# Patient Record
Sex: FEMALE | Race: WHITE | NOT HISPANIC OR LATINO | Employment: FULL TIME | URBAN - METROPOLITAN AREA
[De-identification: names, ages, dates, MRNs, and addresses within clinical notes are randomized per-mention and may not be internally consistent; named-entity substitution may affect disease eponyms.]

---

## 2020-08-10 ENCOUNTER — OFFICE VISIT (OUTPATIENT)
Dept: ENDOCRINOLOGY | Facility: CLINIC | Age: 60
End: 2020-08-10
Payer: COMMERCIAL

## 2020-08-10 VITALS
SYSTOLIC BLOOD PRESSURE: 138 MMHG | HEART RATE: 94 BPM | HEIGHT: 60 IN | WEIGHT: 179.44 LBS | BODY MASS INDEX: 35.23 KG/M2 | DIASTOLIC BLOOD PRESSURE: 78 MMHG

## 2020-08-10 DIAGNOSIS — E11.9 TYPE 2 DIABETES MELLITUS WITHOUT COMPLICATION, WITH LONG-TERM CURRENT USE OF INSULIN: Primary | ICD-10-CM

## 2020-08-10 DIAGNOSIS — E66.01 CLASS 2 SEVERE OBESITY WITH SERIOUS COMORBIDITY AND BODY MASS INDEX (BMI) OF 35.0 TO 35.9 IN ADULT, UNSPECIFIED OBESITY TYPE: ICD-10-CM

## 2020-08-10 DIAGNOSIS — Z79.4 TYPE 2 DIABETES MELLITUS WITHOUT COMPLICATION, WITH LONG-TERM CURRENT USE OF INSULIN: Primary | ICD-10-CM

## 2020-08-10 PROBLEM — E66.812 CLASS 2 SEVERE OBESITY WITH SERIOUS COMORBIDITY IN ADULT: Status: ACTIVE | Noted: 2020-08-10

## 2020-08-10 PROCEDURE — 3008F BODY MASS INDEX DOCD: CPT | Mod: CPTII,S$GLB,, | Performed by: INTERNAL MEDICINE

## 2020-08-10 PROCEDURE — 3008F PR BODY MASS INDEX (BMI) DOCUMENTED: ICD-10-PCS | Mod: CPTII,S$GLB,, | Performed by: INTERNAL MEDICINE

## 2020-08-10 PROCEDURE — 99999 PR PBB SHADOW E&M-NEW PATIENT-LVL IV: CPT | Mod: PBBFAC,,, | Performed by: INTERNAL MEDICINE

## 2020-08-10 PROCEDURE — 99999 PR PBB SHADOW E&M-NEW PATIENT-LVL IV: ICD-10-PCS | Mod: PBBFAC,,, | Performed by: INTERNAL MEDICINE

## 2020-08-10 PROCEDURE — 99204 OFFICE O/P NEW MOD 45 MIN: CPT | Mod: S$GLB,,, | Performed by: INTERNAL MEDICINE

## 2020-08-10 PROCEDURE — 99204 PR OFFICE/OUTPT VISIT, NEW, LEVL IV, 45-59 MIN: ICD-10-PCS | Mod: S$GLB,,, | Performed by: INTERNAL MEDICINE

## 2020-08-10 RX ORDER — METFORMIN HYDROCHLORIDE 500 MG/1
1000 TABLET, EXTENDED RELEASE ORAL 2 TIMES DAILY WITH MEALS
Qty: 360 TABLET | Refills: 3 | Status: SHIPPED | OUTPATIENT
Start: 2020-08-10 | End: 2021-09-03

## 2020-08-10 RX ORDER — METFORMIN HYDROCHLORIDE 500 MG/1
500 TABLET ORAL 4 TIMES DAILY
COMMUNITY
Start: 2019-01-01 | End: 2020-08-10

## 2020-08-10 RX ORDER — FLUTICASONE PROPIONATE 50 MCG
1 SPRAY, SUSPENSION (ML) NASAL DAILY
COMMUNITY

## 2020-08-10 RX ORDER — DULAGLUTIDE 0.75 MG/.5ML
0.75 INJECTION, SOLUTION SUBCUTANEOUS
Qty: 4 PEN | Refills: 5 | Status: SHIPPED | OUTPATIENT
Start: 2020-08-10 | End: 2020-12-28 | Stop reason: SDUPTHER

## 2020-08-10 RX ORDER — INSULIN DEGLUDEC 200 U/ML
20 INJECTION, SOLUTION SUBCUTANEOUS DAILY
COMMUNITY
Start: 2020-07-14 | End: 2020-12-28 | Stop reason: SDUPTHER

## 2020-08-10 RX ORDER — INSULIN ASPART 100 [IU]/ML
8 INJECTION, SUSPENSION SUBCUTANEOUS
COMMUNITY
End: 2020-09-21

## 2020-08-10 RX ORDER — MULTIVIT WITH MINERALS/HERBS
1 TABLET ORAL DAILY
COMMUNITY
End: 2023-07-07 | Stop reason: SDUPTHER

## 2020-08-10 RX ORDER — PEN NEEDLE, DIABETIC 31 GX5/16"
NEEDLE, DISPOSABLE MISCELLANEOUS
COMMUNITY
Start: 2020-07-25 | End: 2020-08-19 | Stop reason: SDUPTHER

## 2020-08-10 RX ORDER — INSULIN ASPART 100 [IU]/ML
6 INJECTION, SOLUTION INTRAVENOUS; SUBCUTANEOUS
COMMUNITY
Start: 2020-08-03 | End: 2020-12-28 | Stop reason: SDUPTHER

## 2020-08-10 RX ORDER — BLOOD-GLUCOSE TRANSMITTER
2 EACH MISCELLANEOUS
Qty: 2 DEVICE | Refills: 1 | Status: SHIPPED | OUTPATIENT
Start: 2020-08-10 | End: 2021-10-08 | Stop reason: SDUPTHER

## 2020-08-10 RX ORDER — BLOOD-GLUCOSE SENSOR
3 EACH MISCELLANEOUS
Qty: 3 DEVICE | Refills: 11 | Status: SHIPPED | OUTPATIENT
Start: 2020-08-10 | End: 2022-01-28 | Stop reason: SDUPTHER

## 2020-08-10 RX ORDER — CETIRIZINE HYDROCHLORIDE 10 MG/1
10 TABLET ORAL DAILY
COMMUNITY
Start: 2013-10-10

## 2020-08-10 NOTE — Clinical Note
HI,  This is a patient you saw at [facility] who came to see me so she can re-establish with you. Starting Dexcom and doing labs to evaluate DM etiology   I asked her to see you in about 1 month

## 2020-08-10 NOTE — PATIENT INSTRUCTIONS
When starting Trulicity, decrease novolog to 4 units with breakfast and 6 units lunch and dinner  Start new scale  Continue Tresiba 20 units daily and metformin

## 2020-08-10 NOTE — ASSESSMENT & PLAN NOTE
Reports diagnosis at age 24 and per her type 2. Will further evaluate with c-peptide, REILLY given age at diagnosis and long-standing disease  A1c and SMBG above goal  Discussed options and will retry Trulicity with reduced insulin doses as below as would benefit from weight loss    New Dm regimen:  Patient Instructions   When starting Trulicity, decrease novolog to 4 units with breakfast and 6 units lunch and dinner  Start new scale  Continue Tresiba 20 units daily and metformin       Check labs before next visit    Logs provided and will bring to visit      Will send prescription for Dexcom as would benefit greatly from system given glycemic variability with both hyperglycemia and hypoglycemia                 She is willing and able to use the device, demonstrated an understanding of the technology and is motivated to use CGM                She is expected to adhere to a comprehensive diabetes treatment plan and patient has adequate medical supervision                I am also suspicious of hypoglycemia or multiple impaired awareness of hypoglycemia (hypo unawareness)

## 2020-08-10 NOTE — PROGRESS NOTES
"Kalpana Rivera is a 59 y.o. female [facility]    History of Present Illness  T2DM  Diagnosed at age 24. On oral medications alone in the beginning. Started insulin during first pregnancy and able to come off. In  had son and has been on insulin since that time  Known complications:denies    Current Diabetes Regimen:  Tresiba 20 units daily  Novolog 6/8-10 lunch and dinner   Metformin 1000 mg BID    Timing of prandial insulin: right before or after  Omitted doses: not very often, dinner usually     Prior mediations tried:  Trulicity- very low appetite so stopped  levemir    Recent Hgb A1C:  No results found for: LABA1C, HGBA1C   2020 8.3%    Glucose Monitorin times daily  Fastin-120  Lunch: 180-200  Dinner: 180-200    Hypoglycemic Episodes:  Used to get them often but less frequently now. Not particular time of day    Screening / DM Complications:    Nephropathy:  ACEi/ARB: Not taking  No results found for: MICALBCREAT    Last Lipid Panel:  Statin: Not taking  No results found for: LDLCALC      Last foot exam Most Recent Foot Exam Date: Not Found  Last eye exam : 2020;  At last visit sent to specialist    B12:  No results found for: [id number]            Current Outpatient Medications:     b complex vitamins tablet, Take 1 tablet by mouth once daily., Disp: , Rfl:     BD ULTRA-FINE SHORT PEN NEEDLE 31 gauge x 5/16" Ndle, USE QID UTD, Disp: , Rfl:     cetirizine (ZYRTEC) 10 MG tablet, Take 10 mg by mouth once daily., Disp: , Rfl:     fluticasone propionate (FLONASE) 50 mcg/actuation nasal spray, 1 spray by Each Nostril route once daily., Disp: , Rfl:     insulin aspart protamine-insulin aspart (NOVOLOG 70/30) 100 unit/mL (70-30) InPn pen, Inject 8 Units into the skin 2 (two) times daily before meals. 8-10 units at lunch and dinner, Disp: , Rfl:     Lactobacillus rhamnosus GG (CULTURELLE) 10 billion cell capsule, Take 1 capsule by mouth once " daily., Disp: , Rfl:     multivitamin with minerals tablet, Take 1 tablet by mouth once daily., Disp: , Rfl:     NOVOLOG FLEXPEN U-100 INSULIN 100 unit/mL (3 mL) InPn pen, Inject 6 Units into the skin before breakfast., Disp: , Rfl:     TRESIBA FLEXTOUCH U-200 200 unit/mL (3 mL) InPn, 20 Units by abdominal subcutaneous route once daily., Disp: , Rfl:     TURMERIC ORAL, Take by mouth., Disp: , Rfl:     UNABLE TO FIND, Cranberry & Buchu natural supplement, Disp: , Rfl:     UNABLE TO FIND, Nerve Eight natural supplement, Disp: , Rfl:     UNABLE TO FIND, [place] natural supplement, Disp: , Rfl:     blood-glucose sensor (DEXCOM G6 SENSOR) Brittany, 3 Devices by Misc.(Non-Drug; Combo Route) route every 30 days., Disp: 3 Device, Rfl: 11    blood-glucose transmitter (DEXCOM G6 TRANSMITTER) Brittany, 2 Devices by Misc.(Non-Drug; Combo Route) route every 6 (six) months., Disp: 2 Device, Rfl: 1    dulaglutide (TRULICITY) 0.75 mg/0.5 mL pen injector, Inject 0.75 mg into the skin every 7 days., Disp: 4 pen, Rfl: 5    metFORMIN (GLUCOPHAGE-XR) 500 MG XR 24hr tablet, Take 2 tablets (1,000 mg total) by mouth 2 (two) times daily with meals., Disp: 360 tablet, Rfl: 3    Review of Systems   Constitutional: Negative for fever.   Eyes: Negative for pain.   Respiratory: Negative for shortness of breath.    Cardiovascular: Negative for chest pain and leg swelling.   Gastrointestinal: Negative for abdominal pain.   Genitourinary: Negative for dysuria.   Musculoskeletal: Negative for arthralgias.   Skin: Negative for rash.   Neurological: Negative for headaches.   Psychiatric/Behavioral: Negative for confusion.       Objective:     Vitals:    08/10/20 1458   BP: 138/78   Pulse: 94     Wt Readings from Last 3 Encounters:   08/10/20 81.4 kg (179 lb 7.3 oz)     Body mass index is 35.05 kg/m².  Physical Exam  Constitutional:       Appearance: She is well-developed.   HENT:      Head: Normocephalic.      Right Ear: External ear normal.       Left Ear: External ear normal.   Eyes:      Conjunctiva/sclera: Conjunctivae normal.      Pupils: Pupils are equal, round, and reactive to light.   Neck:      Thyroid: No thyromegaly.      Trachea: No tracheal deviation.      Comments: No thyromegaly or nodules  Cardiovascular:      Rate and Rhythm: Normal rate and regular rhythm.      Heart sounds: No murmur.      Comments: No LE edema  Pulmonary:      Effort: Pulmonary effort is normal.      Breath sounds: Normal breath sounds.   Abdominal:      General: There is no distension.      Palpations: Abdomen is soft. There is no mass.      Tenderness: There is no abdominal tenderness.      Hernia: No hernia is present.      Comments: No lipohypertrophy   Skin:     General: Skin is warm.      Findings: No rash.      Comments: No nodules   Neurological:      Mental Status: She is alert and oriented to person, place, and time.   Psychiatric:         Judgment: Judgment normal.     Protective Sensation (w/ 10 gram monofilament):8/10/20  Right: Intact  Left: Intact    Visual Inspection:  Normal -  Bilateral    Pedal Pulses:   Right: Present  Left: Present    Posterior tibialis:   Right:Present  Left: Present        Labs    Chemistry    No results found for: NA, K, CL, CO2, BUN, CREATININE, GLU No results found for: CALCIUM, ALKPHOS, AST, ALT, BILITOT, ESTGFRAFRICA, EGFRNONAA           Assessment and Plan     Type 2 diabetes mellitus without complication, with long-term current use of insulin  Reports diagnosis at age 24 and per her type 2. Will further evaluate with c-peptide, REILLY given age at diagnosis and long-standing disease  A1c and SMBG above goal  Discussed options and will retry Trulicity with reduced insulin doses as below as would benefit from weight loss    New Dm regimen:  Patient Instructions   When starting Trulicity, decrease novolog to 4 units with breakfast and 6 units lunch and dinner  Start new scale  Continue Tresiba 20 units daily and metformin        Check labs before next visit    Logs provided and will bring to visit      Will send prescription for Dexcom as would benefit greatly from system given glycemic variability with both hyperglycemia and hypoglycemia                 She is willing and able to use the device, demonstrated an understanding of the technology and is motivated to use CGM                She is expected to adhere to a comprehensive diabetes treatment plan and patient has adequate medical supervision                I am also suspicious of hypoglycemia or multiple impaired awareness of hypoglycemia (hypo unawareness)                  Class 2 severe obesity with serious comorbidity in adult  Start Trulicity as above        RTC 1 month with Agata Zeng MD

## 2020-08-19 RX ORDER — PEN NEEDLE, DIABETIC 31 GX5/16"
NEEDLE, DISPOSABLE MISCELLANEOUS
Qty: 120 EACH | Refills: 11 | Status: SHIPPED | OUTPATIENT
Start: 2020-08-19 | End: 2021-07-07

## 2020-08-19 NOTE — TELEPHONE ENCOUNTER
"----- Message from Rose Hernandez sent at 8/19/2020 12:34 PM CDT -----  Pt states she need a refill on her medication BD ULTRA-FINE SHORT PEN NEEDLE 31 gauge x 5/16" Ndle    Please send to [id number] - [place], [place] - [address] AT [facility] OF [address] [phone] (Phone)  [phone] (Fax)      "

## 2020-09-16 LAB
ALBUMIN SERPL-MCNC: 4.1 G/DL (ref 3.6–5.1)
ALBUMIN/GLOB SERPL: 1.7 (CALC) (ref 1–2.5)
ALP SERPL-CCNC: 63 U/L (ref 37–153)
ALT SERPL-CCNC: 15 U/L (ref 6–29)
AST SERPL-CCNC: 14 U/L (ref 10–35)
BILIRUB SERPL-MCNC: 0.4 MG/DL (ref 0.2–1.2)
BUN SERPL-MCNC: 10 MG/DL (ref 7–25)
BUN/CREAT SERPL: ABNORMAL (CALC) (ref 6–22)
C PEPTIDE SERPL-MCNC: 0.1 NG/ML (ref 0.8–3.85)
CALCIUM SERPL-MCNC: 9.2 MG/DL (ref 8.6–10.4)
CHLORIDE SERPL-SCNC: 103 MMOL/L (ref 98–110)
CHOLEST SERPL-MCNC: 122 MG/DL
CHOLEST/HDLC SERPL: 1.9 (CALC)
CO2 SERPL-SCNC: 30 MMOL/L (ref 20–32)
CREAT SERPL-MCNC: 0.67 MG/DL (ref 0.5–0.99)
GAD65 AB SER IA-ACNC: 97 IU/ML
GFRSERPLBLD MDRD-ARVRAT: 96 ML/MIN/1.73M2
GLOBULIN SER CALC-MCNC: 2.4 G/DL (CALC) (ref 1.9–3.7)
GLUCOSE SERPL-MCNC: 154 MG/DL (ref 65–99)
HBA1C MFR BLD: 7.6 % OF TOTAL HGB
HDLC SERPL-MCNC: 63 MG/DL
LDLC SERPL CALC-MCNC: 46 MG/DL (CALC)
NONHDLC SERPL-MCNC: 59 MG/DL (CALC)
POTASSIUM SERPL-SCNC: 4.1 MMOL/L (ref 3.5–5.3)
PROT SERPL-MCNC: 6.5 G/DL (ref 6.1–8.1)
SODIUM SERPL-SCNC: 141 MMOL/L (ref 135–146)
TRIGL SERPL-MCNC: 44 MG/DL
TSH SERPL-ACNC: 1.8 MIU/L (ref 0.4–4.5)

## 2020-09-18 NOTE — PROGRESS NOTES
Subjective:      Patient ID: Kalpana Rivera is a 59 y.o. female.    Chief Complaint:  Diabetes    History of Present Illness  Kalpana Rivera presents today for follow up of now managed as type 1 diabetic-insulin dependent. Previous patient of Dr. Zeng. Last seen in Aug 2020. This is her first visit with me.      Ref. Range 2020 07:45   C-Peptide Latest Ref Range: 0.80 - 3.85 ng/mL 0.10 (L)      Ref. Range 2020 07:45   Glucose Latest Ref Range: 65 - 99 mg/dL 154 (H)     Glutamate decarboxylase 65 Ab Latest Ref Range: <5 IU/mL 97 (H)     With regards to the diabetes:    Diagnosed with diabetes age 24  Started insulin during first pregnancy and able to come off. In  had son and has been on insulin since that time  Nephew is type 1  Family History of Type 2 DM: mother and father  Known complications:  DKA/HHS: twice in the past  RN: radha; up to date with eye exam  PN: denies  Nephropathy: due  Gastroparesis: denies  CAD: denies    Current regimen:  Tresiba  20 units daily  Metformin 1000 mg BID  Novolog 6/8/10 -she takes 6-12 units before meals and has decreased her insulin  Trulicity 0.75 mg weekly  Noticed better BGs since Trulicity    Missed doses  Timing of prandial insulin: right before or after  Omitted doses: not very often, dinner usually     Other medications tried:  Trulicity 1.5 mg weekly- very low appetite so stopped  levemir    # times a day testing  Has dexcom but did not put on  4 times daily  Fastin-150  Lunch: 120-150; some outlier of 170 or 180  Dinner: 120-150  Log reviewed: oral recall    Hypoglycemic event  A few and has reduced insulin as above  Yes, did not need assistance   Knows how to correct with 15 grams of carbs- juice, coke, or a peppermint.     Dietary recall: She feels that she is following diabetic diet  Eats 3 meals a day. States that she has noticed portion sizes decreased because she is not as hungry.  B: greek yogurt with splenda or protein shake  -premier  L: healthy choice or grilled cheese sandwich  D: same as lunch  She does not cook often but has some frozen foods.   She has been working from home.   Snacks: not really snacking as much anymore; pickles and cashews; carrots  Drinks: water, green tea, and diet coke    Exercise - tried to stay active. No formal exercise. She knows that she needs to use her treadmill.     Education - last visit: politely declines    Has a Medic alert tag-does not have  Glucagon/Baqsimi-declines; lives alone    Diabetes Management Status  Statin: Not taking  ACE/ARB: Not taking    States last A1c (not in our system) was 8.3%  Lab Results   Component Value Date    HGBA1C 7.6 (H) 09/14/2020     Screening or Prevention Patient's value Goal Complete/Controlled?   HgA1C Testing and Control   Lab Results   Component Value Date    HGBA1C 7.6 (H) 09/14/2020      Annually/Less than 8% Yes   Lipid profile : 09/14/2020 Annually Yes   LDL control Lab Results   Component Value Date    LDLCALC 46 09/14/2020    Annually/Less than 100 mg/dl  Yes   Nephropathy screening No results found for: LABMICR  No results found for: PROTEINUA Annually No   Blood pressure BP Readings from Last 1 Encounters:   09/21/20 125/72    Less than 140/90 Yes   Dilated retinal exam : 01/01/2020 Annually Yes   Foot exam   : 08/10/2020 Annually Yes     With regards to thyroid nodule,     Remembers being told in the past that she had a thyroid nodule and needed biopsy which she did not do. Denies hoarseness or dysphagia. No radiation to head or neck. No history of thyroid cancer in family.       Not on medications for cholesterol     Ref. Range 9/14/2020 07:45   Cholesterol Latest Ref Range: <200 mg/dL 122   HDL Latest Ref Range: > OR = 50 mg/dL 63   Hdl/Cholesterol Ratio Latest Ref Range: <5.0 (calc) 1.9   LDL Cholesterol External Latest Units: mg/dL (calc) 46   Non HDL Chol. (LDL+VLDL) Latest Ref Range: <130 mg/dL (calc) 59   Triglycerides Latest Ref Range: <150  "mg/dL 44     Review of Systems   Constitutional: Negative for fatigue.   Eyes: Negative for visual disturbance.   Respiratory: Negative for shortness of breath.    Cardiovascular: Negative for chest pain.   Gastrointestinal: Negative for abdominal pain.   Musculoskeletal: Negative for arthralgias.   Skin: Negative for wound.   Neurological: Negative for headaches.   Hematological: Does not bruise/bleed easily.   Psychiatric/Behavioral: Negative for sleep disturbance.     Objective:   Physical Exam  Vitals signs reviewed.   Constitutional:       Appearance: She is well-developed.   Neck:      Thyroid: No thyromegaly.   Cardiovascular:      Rate and Rhythm: Normal rate.   Pulmonary:      Effort: Pulmonary effort is normal.   Abdominal:      Palpations: Abdomen is soft.     injection sites are without edema or erythema. No lipo hypertropthy or atrophy  Diabetes Foot Exam:   Denies sores or lesions to bilateral feet  Shoes appropriate  DM foot exam deferred; done in Aug 2020    Visit Vitals  /72 (BP Location: Left arm, Patient Position: Sitting, BP Method: Large (Manual))   Pulse 92   Ht 4' 11.5" (1.511 m)   Wt 80.3 kg (177 lb 0.5 oz)   BMI 35.16 kg/m²        Lab Review:   Lab Results   Component Value Date    HGBA1C 7.6 (H) 09/14/2020      Lab Results   Component Value Date    CHOL 122 09/14/2020    HDL 63 09/14/2020    LDLCALC 46 09/14/2020    TRIG 44 09/14/2020    CHOLHDL 1.9 09/14/2020     Lab Results   Component Value Date     09/14/2020    K 4.1 09/14/2020     09/14/2020    CO2 30 09/14/2020     (H) 09/14/2020    BUN 10 09/14/2020    CREATININE 0.67 09/14/2020    CALCIUM 9.2 09/14/2020    PROT 6.5 09/14/2020    ALBUMIN 4.1 09/14/2020    BILITOT 0.4 09/14/2020    AST 14 09/14/2020    ALT 15 09/14/2020    ESTGFRAFRICA 111 09/14/2020    EGFRNONAA 96 09/14/2020    TSH 1.80 09/14/2020     No results found for: [id number]  Assessment and Plan     1. Type 1 diabetes mellitus without " complication  Comprehensive metabolic panel    Hemoglobin A1C   2. Class 2 severe obesity with serious comorbidity and body mass index (BMI) of 35.0 to 35.9 in adult, unspecified obesity type     3. 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 Thyroid nodule  US Soft Tissue Head Neck Thyroid    CANCELED: US Soft Tissue Head Neck Thyroid       Type 2 diabetes mellitus without complication, with long-term current use of insulin  Reports diagnosis at age 24 and per her type 2. Will further evaluate with c-peptide, REILLY given age at diagnosis and long-standing disease  A1c and SMBG above goal  Discussed options and will retry Trulicity with reduced insulin doses as below as would benefit from weight loss    New Dm regimen:  There are no Patient Instructions on file for this visit.    Check labs before next visit    Logs provided and will bring to visit      Will send prescription for Dexcom as would benefit greatly from system given glycemic variability with both hyperglycemia and hypoglycemia                 She is willing and able to use the device, demonstrated an understanding of the technology and is motivated to use CGM                She is expected to adhere to a comprehensive diabetes treatment plan and patient has adequate medical supervision                I am also suspicious of hypoglycemia or multiple impaired awareness of hypoglycemia (hypo unawareness)                  Class 2 severe obesity with serious comorbidity in adult  Continue Trulicity as above  Has had 2 pound weight loss since her last visit one month ago    Type 1 diabetes mellitus  -- Reviewed goals of therapy are to get the best control we can without hypoglycemia  -- Refer to diabetes education for dexcom training  -- Discussed her most recent labs show she is not able to produce much insulin. Treat as type 1 diabetic. Continue Trulicity due to satiety effects which  may help with weight loss.  Medication changes:   Continue    Tresiba  20 units daily    Metformin 1000 mg BID    Novolog 6/8/10    Trulicity 0.75 mg weekly     Continue     Tresiba  20 units daily    Metformin 1000 mg BID    Novolog 6/8/10    Trulicity 0.75 mg weekly    We will get her set up with diabetes education to apply dexcom and then review data. Instructed and demonstrated on how to add event on dexcom for insulin administration. Please try to give insulin at least 10 minutes before a meal. Discussed Vgo and she will let us know in the future  -- Reviewed patient's current insulin regimen. Clarified proper insulin dose and timing in relation to meals, etc. Insulin injection sites and proper rotation instructed.    -- Advised frequent self blood glucose monitoring.  Patient encouraged to document glucose results and bring them to every clinic visit    -- Hypoglycemia precautions discussed. Instructed on precautions before driving.    -- Call for Bg repeatedly < 90 or > 180.   -- Close adherence to lifestyle changes recommended.   -- Periodic follow ups for eye evaluations, foot care and dental care suggested.  -- Given information on diabetic snacks and hypoglycemia      Thyroid nodule  -- Check thyroid US      Follow up in about 3 months (around 12/21/2020).  Labs prior to next visit

## 2020-09-21 ENCOUNTER — OFFICE VISIT (OUTPATIENT)
Dept: ENDOCRINOLOGY | Facility: CLINIC | Age: 60
End: 2020-09-21
Payer: COMMERCIAL

## 2020-09-21 VITALS
HEART RATE: 92 BPM | BODY MASS INDEX: 34.75 KG/M2 | HEIGHT: 60 IN | WEIGHT: 177 LBS | DIASTOLIC BLOOD PRESSURE: 72 MMHG | SYSTOLIC BLOOD PRESSURE: 125 MMHG

## 2020-09-21 DIAGNOSIS — E66.01 CLASS 2 SEVERE OBESITY WITH SERIOUS COMORBIDITY AND BODY MASS INDEX (BMI) OF 35.0 TO 35.9 IN ADULT, UNSPECIFIED OBESITY TYPE: ICD-10-CM

## 2020-09-21 DIAGNOSIS — E10.9 TYPE 1 DIABETES MELLITUS WITHOUT COMPLICATION: Primary | ICD-10-CM

## 2020-09-21 DIAGNOSIS — E04.1 THYROID NODULE: ICD-10-CM

## 2020-09-21 DIAGNOSIS — E13.9 LADA (LATENT AUTOIMMUNE DIABETES IN ADULTS), MANAGED AS TYPE 1: ICD-10-CM

## 2020-09-21 PROBLEM — E11.9 TYPE 2 DIABETES MELLITUS WITHOUT COMPLICATION, WITH LONG-TERM CURRENT USE OF INSULIN: Status: RESOLVED | Noted: 2020-08-10 | Resolved: 2020-09-21

## 2020-09-21 PROBLEM — Z79.4 TYPE 2 DIABETES MELLITUS WITHOUT COMPLICATION, WITH LONG-TERM CURRENT USE OF INSULIN: Status: RESOLVED | Noted: 2020-08-10 | Resolved: 2020-09-21

## 2020-09-21 PROCEDURE — 3051F PR MOST RECENT HEMOGLOBIN A1C LEVEL 7.0 - < 8.0%: ICD-10-PCS | Mod: CPTII,S$GLB,, | Performed by: NURSE PRACTITIONER

## 2020-09-21 PROCEDURE — 99999 PR PBB SHADOW E&M-EST. PATIENT-LVL V: CPT | Mod: PBBFAC,,, | Performed by: NURSE PRACTITIONER

## 2020-09-21 PROCEDURE — 3008F PR BODY MASS INDEX (BMI) DOCUMENTED: ICD-10-PCS | Mod: CPTII,S$GLB,, | Performed by: NURSE PRACTITIONER

## 2020-09-21 PROCEDURE — 99214 PR OFFICE/OUTPT VISIT, EST, LEVL IV, 30-39 MIN: ICD-10-PCS | Mod: S$GLB,,, | Performed by: NURSE PRACTITIONER

## 2020-09-21 PROCEDURE — 3051F HG A1C>EQUAL 7.0%<8.0%: CPT | Mod: CPTII,S$GLB,, | Performed by: NURSE PRACTITIONER

## 2020-09-21 PROCEDURE — 99999 PR PBB SHADOW E&M-EST. PATIENT-LVL V: ICD-10-PCS | Mod: PBBFAC,,, | Performed by: NURSE PRACTITIONER

## 2020-09-21 PROCEDURE — 99214 OFFICE O/P EST MOD 30 MIN: CPT | Mod: S$GLB,,, | Performed by: NURSE PRACTITIONER

## 2020-09-21 PROCEDURE — 3008F BODY MASS INDEX DOCD: CPT | Mod: CPTII,S$GLB,, | Performed by: NURSE PRACTITIONER

## 2020-09-21 NOTE — ASSESSMENT & PLAN NOTE
Reports diagnosis at age 24 and per her type 2. Will further evaluate with c-peptide, REILLY given age at diagnosis and long-standing disease  A1c and SMBG above goal  Discussed options and will retry Trulicity with reduced insulin doses as below as would benefit from weight loss    New Dm regimen:  There are no Patient Instructions on file for this visit.    Check labs before next visit    Logs provided and will bring to visit      Will send prescription for Dexcom as would benefit greatly from system given glycemic variability with both hyperglycemia and hypoglycemia                 She is willing and able to use the device, demonstrated an understanding of the technology and is motivated to use CGM                She is expected to adhere to a comprehensive diabetes treatment plan and patient has adequate medical supervision                I am also suspicious of hypoglycemia or multiple impaired awareness of hypoglycemia (hypo unawareness)

## 2020-09-21 NOTE — ASSESSMENT & PLAN NOTE
-- Reviewed goals of therapy are to get the best control we can without hypoglycemia  -- Refer to diabetes education for dexcom training  -- Discussed her most recent labs show she is not able to produce much insulin. Treat as type 1 diabetic. Continue Trulicity due to satiety effects which may help with weight loss.  Medication changes:   Continue    Tresiba  20 units daily    Metformin 1000 mg BID    Novolog 6/8/10    Trulicity 0.75 mg weekly     Continue     Tresiba  20 units daily    Metformin 1000 mg BID    Novolog 6/8/10    Trulicity 0.75 mg weekly    We will get her set up with diabetes education to apply dexcom and then review data. Instructed and demonstrated on how to add event on dexcom for insulin administration. Please try to give insulin at least 10 minutes before a meal. Discussed Vgo and she will let us know in the future  -- Reviewed patient's current insulin regimen. Clarified proper insulin dose and timing in relation to meals, etc. Insulin injection sites and proper rotation instructed.    -- Advised frequent self blood glucose monitoring.  Patient encouraged to document glucose results and bring them to every clinic visit    -- Hypoglycemia precautions discussed. Instructed on precautions before driving.    -- Call for Bg repeatedly < 90 or > 180.   -- Close adherence to lifestyle changes recommended.   -- Periodic follow ups for eye evaluations, foot care and dental care suggested.  -- Given information on diabetic snacks and hypoglycemia

## 2020-09-21 NOTE — PATIENT INSTRUCTIONS
Diabetes   Continue     Tresiba  20 units daily    Metformin 1000 mg BID    Novolog 6/8/10    Trulicity 0.75 mg weekly    We will get her set up with diabetes education to apply dexcom and then review data. Instructed and demonstrated on how to add event on dexcom for insulin administration. Please try to give insulin at least 10 minutes before a meal. Discussed Vgo and she will let us know.       Snacks can be an important part of a balanced, healthy meal plan. They allow you to eat more frequently, feeling full and satisfied throughout the day. Also, they allow you to spread carbohydrates evenly, which may stabilize blood sugars.  Plus, snacks are enjoyable!     The amount of carbohydrate needed at snacks varies. Generally, about 15-30 grams of carbohydrate per snack is recommended.  Below you will find some tasty treats.       0-5 gm carb   Crystal Light   Vitamin Water Zero   Herbal tea, unsweetened   2 tsp peanut butter on celery   1./2 cup sugar-free jell-o   1 sugar-free popsicle   ¼ cup blueberries   8oz Blue Mercedez unsweetened almond milk   5 baby carrots & celery sticks, cucumbers, bell peppers dipped in ¼ cup salsa, 2Tbsp light ranch dressing or 2Tbsp plain Greek yogurt   10 Goldfish crackers   ½ oz low-fat cheese or string cheese   1 closed handful of nuts, unsalted   1 Tbsp of sunflower seeds, unsalted   1 cup Smart Pop popcorn   1 whole grain brown rice cake        15 gm carb   1 small piece of fruit or ½ banana or 1/2 cup lite canned fruit   3 radha cracker squares   3 cups Smart Pop popcorn, top spray butter, [place]man lite salt or cinnamon and Truvia   5 Vanilla Wafers   ½ cup low fat, no added sugar ice cream or frozen yogurt (Blue bell, Blue Bunny, Weight Watchers, Skinny Cow)   ½ turkey, ham, or chicken sandwich   ½ c fruit with ½ c Cottage cheese   4-6 unsalted wheat crackers with 1 oz low fat cheese or 1 tbsp peanut butter    30-45 goldfish crackers (depending on flavor)     7-8 Baptism mini brown rice cakes (caramel, apple cinnamon, chocolate)    12 Baptism mini brown rice cakes (cheddar, bbq, ranch)    1/3 cup hummus dip with raw veg   1/2 whole wheat kell, 1Tbsp hummus   Mini Pizza (1/2 whole wheat English muffin, low-fat  cheese, tomato sauce)   100 calorie snack pack (Oreo, Chips Ahoy, Ritz Mix, Baked Cheetos)   4-6 oz. light or Greek Style yogurt (Chobani, Yoplait, Okios, [place])   ½ cup sugar-free pudding     6 in. wheat tortilla or kell oven toasted chips (topped with spray butter flavoring, cinnamon, Truvia OR spray butter, garlic powder, chili powder)    18 BBQ Popchips (available at [employer], [employer], Fresh Market)         Diabetic drinks we recommend:   Water -BEST  Crystal light sugar free packets  Gatorade zero   Powerade zero  Tea without sweetener    Diabetic drinks we do not recommend:  Coke or any sugary regular soft drink  Coffee with sugar  Milk  Juice  Beer  Liquor    Sweeteners we recommend:  Stevia   Truvia   Yvonne    Note: Caffeine can increase your blood sugar         Hypoglycemia - Low Blood Sugar    What can you do?  Rule of 15:    Test your blood sugar   If glucose is between 50-70 mg/dL then ingest 15 grams of fast-acting carbs   If glucose is less than 50 mg/dL then ingest 30 grams of fast-acting carbs   Ingest 15 grams of fast-acting carbohydrate - such as:   a. 3-4 glucose tablets  b. 4 oz juice  c. ½ can regular soda pop  d. 15 skittles or mini jelly beans    Re-check your blood sugar in 15 minutes. If its less than 70mg/dl, repeat steps 2 and 3.   If your next meal is more than 1 hour away, eat an additional 15 grams of carbohydrate and 1 ounce of protein (examples include crackers with cheese or one-half of a sandwich with peanut butter). It is important not to eat too much because this can raise your blood sugar above the target level.      After your blood sugar has normalized, think about why you went low. If you notice a pattern  of low blood sugars, contact your Diabetes Team. We may need to adjust your medication.

## 2020-09-28 ENCOUNTER — PATIENT MESSAGE (OUTPATIENT)
Dept: DIABETES | Facility: CLINIC | Age: 60
End: 2020-09-28

## 2020-09-28 ENCOUNTER — NUTRITION (OUTPATIENT)
Dept: DIABETES | Facility: CLINIC | Age: 60
End: 2020-09-28
Payer: COMMERCIAL

## 2020-09-28 DIAGNOSIS — E13.9 LADA (LATENT AUTOIMMUNE DIABETES IN ADULTS), MANAGED AS TYPE 1: ICD-10-CM

## 2020-09-28 PROCEDURE — 99999 PR PBB SHADOW E&M-EST. PATIENT-LVL I: ICD-10-PCS | Mod: PBBFAC,,, | Performed by: DIETITIAN, REGISTERED

## 2020-09-28 PROCEDURE — G0108 DIAB MANAGE TRN  PER INDIV: HCPCS | Mod: S$GLB,,, | Performed by: DIETITIAN, REGISTERED

## 2020-09-28 PROCEDURE — 99999 PR PBB SHADOW E&M-EST. PATIENT-LVL I: CPT | Mod: PBBFAC,,, | Performed by: DIETITIAN, REGISTERED

## 2020-09-28 PROCEDURE — G0108 PR DIAB MANAGE TRN  PER INDIV: ICD-10-PCS | Mod: S$GLB,,, | Performed by: DIETITIAN, REGISTERED

## 2020-09-28 NOTE — PROGRESS NOTES
"Diabetes Education  Author: Danuta Padilla RD  Date: 9/28/2020    Diabetes Care Management Summary  Diabetes Education Record Assessment/Progress: Initial  Current Diabetes Risk Level: Low         Diabetes Type  Diabetes Type : Type I    Diabetes History  Diabetes Diagnosis: >10 years  Current Treatment: Oral Medication, Injectable, Insulin(Tresiba 20u Once, Novolog 4/6/6u, Trulicity Once Weekly, Metformin BID)  Reviewed Problem List with Patient: Yes     Patient with hypoglycemia. Advised to have at least 30g of CHO at each meal. Reviewed CHO counting. Patient verbalizes understanding.       DEXCOM G6 CGM TRAINING     Patient referred to clinic today for Dexcom G6 continuous glucose sensor system training.  Patient arrived with  fully charged and Dexcom G6 mobile cassidy downloaded to phone. Education was provided using "Quick Start Guide" per Dexcom protocol.     Pt will be using her Iphone as the primary .  § Overview:  5min glucose reading updates, trending arrows, BG graph screens, battery life indicator, Blue Tooth Symbol.  § Menus: trend Graph, start sensor, enter BG, events, Alerts, Settings, Shutdown, Stop Sensor  §  Settings:                          * Urgent Low: 55 mg/dL                          * Low alert: 70 mg/dl                           * High alert: 200mg/dl                          * Always sound: On                          * Alert Schedule: (instructed on how to set up alert schedule if desired)     · Reviewed additional setting options with patient, including Graph Height and Transmitter ID. Transmitter was paired with .    · Reviewed where to find sensor insertion time and sensor expiration date.   · Discussed no need to calibrate sensor during the entirety of the 10 day wear. Discussed that pt can calibrate sensor if desired, but at that time she will need to continue calibrating every 12 hours for sensor to remain accurate. Reviewed appropriate calibration " techniques.  · Reviewed sensor site selection. Site selected and prepped using aseptic technique Inserted to right upper abdomen. Transmitter placed in pod and secured.  · Practiced sensor pod/transmitter removal from site, and removal of transmitter from sensor pod.  · Patient able to demonstrate without difficulty.  Encouraged to review manual prior to starting another sensor.   · Reviewed problem solving aspects of sensor transmission/ variables that can disrupt RF transmission.  range 20 feet, but the first 3 hrs keep within 3 feet of transmitter.  · Pt instructed on lag time of interstitial fluid from CBG and was advised to tx hypoglycemia and dose insulin based on SMBG values.  · Dexcom technical support contact number given and examples of when to contact them discussed. Pt will download software at home for Dexcom download.            Health Maintenance was reviewed today with patient. Discussed with patient importance of routine eye exams, foot exams/foot care, blood work (i.e.: A1c, microalbumin, and lipid), dental visits, yearly flu vaccine, and pneumonia vaccine as indicated by PCP. Patient verbalized understanding.     Health Maintenance Topics with due status: Not Due       Topic Last Completion Date    Eye Exam 01/01/2020    Foot Exam 08/10/2020    Lipid Panel 09/14/2020    Hemoglobin A1c 09/14/2020     Health Maintenance Due   Topic Date Due    Hepatitis C Screening  1960    Urine Microalbumin  12/01/1969    HIV Screening  12/01/1974    TETANUS VACCINE  12/01/1977    Pneumococcal Vaccine (Medium Risk) (1 of 1 - PPSV23) 12/01/1978    Low Dose Statin  12/01/1980    Cervical Cancer Screening  12/01/1980    Mammogram  12/01/1999    Shingles Vaccine (1 of 2) 12/01/2009    Colorectal Cancer Screening  12/01/2009    Influenza Vaccine (1) 08/01/2020       Nutrition  Meal Planning: (patient reports decreased appetite since trulicity)  What type of beverages do you drink?: diet  soda/tea, water  Meal Plan 24 Hour Recall - Breakfast: yogurt (2g CHO)  Meal Plan 24 Hour Recall - Lunch: frozen meals  Meal Plan 24 Hour Recall - Dinner: same as lunch, cereal  Meal Plan 24 Hour Recall - Snack: carrots, tomatoes, cookies, yogurt, fruit    Monitoring   Self Monitoring : QID  Blood Glucose Logs: No  Do you use a personal continuous glucose monitor?: No  In the last month, how often have you had a low blood sugar reaction?: more than once a week  How do you treat low blood sugar?: apple juice, coke    Exercise   Exercise Type: none    Current Diabetes Treatment   Current Treatment: Oral Medication, Injectable, Insulin(Tresiba 20u Once, Novolog 4/6/6u, Trulicity Once Weekly, Metformin BID)    Social History  Preferred Learning Method: Face to Face  Primary Support: Self      Barriers to Change  Barriers to Change: None  Learning Challenges : None    Readiness to Learn   Readiness to Learn : Acceptance    Cultural Influences  Cultural Influences: None    Diabetes Education Assessment/Progress  Diabetes Disease Process (diabetes disease process and treatment options): Discussion, Demonstrates Understanding/Competency(verbalizes/demonstrates), Comprehends Key Points, Instructed, Written Materials Provided, Individual Session  Nutrition (Incorporating nutritional management into one's lifestyle): Written Materials Provided, Discussion, Individual Session, Comprehends Key Points, Instructed, Needs Review  Physical Activity (incorporating physical activity into one's lifestyle): Written Materials Provided, Discussion, Individual Session, Demonstrates Understanding/Competency (verbalizes/demonstrates), Comprehends Key Points, Instructed  Medications (states correct name, dose, onset, peak, duration, side effects & timing of meds): Written Materials Provided, Discussion, Comprehends Key Points  Monitoring (monitoring blood glucose/other parameters & using results): Written Materials Provided, Comprehends Key  Points, Discussion, Demonstration  Acute Complications (preventing, detecting, and treating acute complications): Written Materials Provided, Discussion, Individual Session, Demonstrates Understanding/Competency (verbalizes/demonstrates), Comprehends Key Points, Instructed  Chronic Complications (preventing, detecting, and treating chronic complications): Not Covered/Deferred  Clinical (diabetes, other pertinent medical history, and relevant comorbidities reviewed during visit): Not Covered/Deferred  Cognitive (knowledge of self-management skills, functional health literacy): Not Covered/Deferred  Psychosocial (emotional response to diabetes): Not Covered/Deferred  Diabetes Distress and Support Systems: Not Covered/Deferred  Behavioral (readiness for change, lifestyle practices, self-care behaviors): Not Covered/Deferred    Goals  Patient has selected/evaluated goals during today's session: Yes, selected  Healthy Eating: Set(Patient will have at least 30g CHO at each meal to avoid hypoglycemia)  Start Date: 09/28/20         Diabetes Care Plan/Intervention  Education Plan/Intervention: Individual Follow-Up DSMT    Diabetes Meal Plan  Carbohydrate Per Meal: 30-45g  Carbohydrate Per Snack : 7-15g    Today's Self-Management Care Plan was developed with the patient's input and is based on barriers identified during today's assessment.    The long and short-term goals in the care plan were written with the patient/caregiver's input. The patient has agreed to work toward these goals to improve her overall diabetes control.      The patient received a copy of today's self-management plan and verbalized understanding of the care plan, goals, and all of today's instructions.      The patient was encouraged to communicate with her physician and care team regarding her condition(s) and treatment.  I provided the patient with my contact information today and encouraged her to contact me via phone or patient portal as needed.      Education Units of Time   Time Spent: 60 min  [email or web address]  SN - [id number]  Transmitter [id number]

## 2020-10-19 ENCOUNTER — PATIENT MESSAGE (OUTPATIENT)
Dept: ENDOCRINOLOGY | Facility: CLINIC | Age: 60
End: 2020-10-19

## 2020-10-19 ENCOUNTER — TELEPHONE (OUTPATIENT)
Dept: ENDOCRINOLOGY | Facility: CLINIC | Age: 60
End: 2020-10-19

## 2020-10-19 NOTE — TELEPHONE ENCOUNTER
----- Message from Agata Sharma NP sent at 10/19/2020  8:17 AM CDT -----  Please download and print dexcom. If no code available, please call patient and generate code.   Thank you  ----- Message -----  From: Agata Sharma NP  Sent: 10/19/2020  To: Agata Sharma NP    Please download dexcom

## 2020-10-20 ENCOUNTER — PATIENT MESSAGE (OUTPATIENT)
Dept: ENDOCRINOLOGY | Facility: CLINIC | Age: 60
End: 2020-10-20

## 2020-10-20 ENCOUNTER — TELEPHONE (OUTPATIENT)
Dept: ENDOCRINOLOGY | Facility: CLINIC | Age: 60
End: 2020-10-20

## 2020-10-28 ENCOUNTER — NUTRITION (OUTPATIENT)
Dept: DIABETES | Facility: CLINIC | Age: 60
End: 2020-10-28
Payer: COMMERCIAL

## 2020-10-28 DIAGNOSIS — E10.9 TYPE 1 DIABETES MELLITUS WITHOUT COMPLICATION: ICD-10-CM

## 2020-10-28 PROCEDURE — G0108 PR DIAB MANAGE TRN  PER INDIV: ICD-10-PCS | Mod: S$GLB,,, | Performed by: DIETITIAN, REGISTERED

## 2020-10-28 PROCEDURE — G0108 DIAB MANAGE TRN  PER INDIV: HCPCS | Mod: S$GLB,,, | Performed by: DIETITIAN, REGISTERED

## 2020-10-28 NOTE — PROGRESS NOTES
Diabetes Education  Author: Danuta Padilla RD  Date: 10/30/2020    Diabetes Care Management Summary  Diabetes Education Record Assessment/Progress: Post Program/Follow-up  Current Diabetes Risk Level: Low         Diabetes Type  Diabetes Type : Type II    Patient in office for follow up:  24hr Recall:  b - yogurt 1/2 banana  L - sandwich (pb&jelly), chips  S - animal crackers  D - vegetables, meat    Insulin 5/6/6 + SS  trulicity once Weekly  tresiba 24u  Per Dexcom:  155 mg/dl avg blood sugar  Time in range, 95%     Reviewed dm diet, cho counting, target blood sugar ranges, etc. Patient verbalizes understanding.      Health Maintenance was reviewed today with patient. Discussed with patient importance of routine eye exams, foot exams/foot care, blood work (i.e.: A1c, microalbumin, and lipid), dental visits, yearly flu vaccine, and pneumonia vaccine as indicated by PCP. Patient verbalized understanding.     Health Maintenance Topics with due status: Not Due       Topic Last Completion Date    Eye Exam 01/01/2020    Foot Exam 08/10/2020    Lipid Panel 09/14/2020    Hemoglobin A1c 09/14/2020     Health Maintenance Due   Topic Date Due    Hepatitis C Screening  1960    Urine Microalbumin  12/01/1969    HIV Screening  12/01/1974    TETANUS VACCINE  12/01/1977    Pneumococcal Vaccine (Medium Risk) (1 of 1 - PPSV23) 12/01/1978    Low Dose Statin  12/01/1980    Cervical Cancer Screening  12/01/1980    Mammogram  12/01/1999    Shingles Vaccine (1 of 2) 12/01/2009    Colorectal Cancer Screening  12/01/2009    Influenza Vaccine (1) 08/01/2020         Today's Self-Management Care Plan was developed with the patient's input and is based on barriers identified during today's assessment.    The long and short-term goals in the care plan were written with the patient/caregiver's input. The patient has agreed to work toward these goals to improve her overall diabetes control.      The patient received a copy of  today's self-management plan and verbalized understanding of the care plan, goals, and all of today's instructions.      The patient was encouraged to communicate with her physician and care team regarding her condition(s) and treatment.  I provided the patient with my contact information today and encouraged her to contact me via phone or patient portal as needed.

## 2020-11-03 ENCOUNTER — HOSPITAL ENCOUNTER (OUTPATIENT)
Dept: ENDOCRINOLOGY | Facility: CLINIC | Age: 60
Discharge: HOME | End: 2020-11-03
Attending: NURSE PRACTITIONER
Payer: COMMERCIAL

## 2020-11-03 ENCOUNTER — PATIENT MESSAGE (OUTPATIENT)
Dept: ENDOCRINOLOGY | Facility: CLINIC | Age: 60
End: 2020-11-03

## 2020-11-03 DIAGNOSIS — E04.1 THYROID NODULE: ICD-10-CM

## 2020-11-03 PROCEDURE — 76536 US SOFT TISSUE HEAD NECK THYROID: ICD-10-PCS | Mod: S$GLB,,, | Performed by: INTERNAL MEDICINE

## 2020-11-03 PROCEDURE — 76536 US EXAM OF HEAD AND NECK: CPT | Mod: S$GLB,,, | Performed by: INTERNAL MEDICINE

## 2020-11-05 ENCOUNTER — PATIENT MESSAGE (OUTPATIENT)
Dept: ENDOCRINOLOGY | Facility: CLINIC | Age: 60
End: 2020-11-05

## 2020-12-09 ENCOUNTER — PATIENT MESSAGE (OUTPATIENT)
Dept: ENDOCRINOLOGY | Facility: CLINIC | Age: 60
End: 2020-12-09

## 2020-12-09 LAB
ALBUMIN SERPL-MCNC: 4 G/DL (ref 3.6–5.1)
ALBUMIN/GLOB SERPL: 2 (CALC) (ref 1–2.5)
ALP SERPL-CCNC: 59 U/L (ref 37–153)
ALT SERPL-CCNC: 17 U/L (ref 6–29)
AST SERPL-CCNC: 16 U/L (ref 10–35)
BILIRUB SERPL-MCNC: 0.4 MG/DL (ref 0.2–1.2)
BUN SERPL-MCNC: 8 MG/DL (ref 7–25)
BUN/CREAT SERPL: ABNORMAL (CALC) (ref 6–22)
CALCIUM SERPL-MCNC: 9 MG/DL (ref 8.6–10.4)
CHLORIDE SERPL-SCNC: 103 MMOL/L (ref 98–110)
CO2 SERPL-SCNC: 27 MMOL/L (ref 20–32)
CREAT SERPL-MCNC: 0.56 MG/DL (ref 0.5–0.99)
GFRSERPLBLD MDRD-ARVRAT: 101 ML/MIN/1.73M2
GLOBULIN SER CALC-MCNC: 2 G/DL (CALC) (ref 1.9–3.7)
GLUCOSE SERPL-MCNC: 86 MG/DL (ref 65–99)
HBA1C MFR BLD: 7 % OF TOTAL HGB
POTASSIUM SERPL-SCNC: 4 MMOL/L (ref 3.5–5.3)
PROT SERPL-MCNC: 6 G/DL (ref 6.1–8.1)
SODIUM SERPL-SCNC: 141 MMOL/L (ref 135–146)

## 2020-12-28 ENCOUNTER — OFFICE VISIT (OUTPATIENT)
Dept: ENDOCRINOLOGY | Facility: CLINIC | Age: 60
End: 2020-12-28
Payer: COMMERCIAL

## 2020-12-28 VITALS
DIASTOLIC BLOOD PRESSURE: 72 MMHG | WEIGHT: 172.19 LBS | BODY MASS INDEX: 34.71 KG/M2 | SYSTOLIC BLOOD PRESSURE: 122 MMHG | HEIGHT: 59 IN

## 2020-12-28 DIAGNOSIS — E11.9 TYPE 2 DIABETES MELLITUS WITHOUT COMPLICATION, WITH LONG-TERM CURRENT USE OF INSULIN: ICD-10-CM

## 2020-12-28 DIAGNOSIS — Z79.4 TYPE 2 DIABETES MELLITUS WITHOUT COMPLICATION, WITH LONG-TERM CURRENT USE OF INSULIN: ICD-10-CM

## 2020-12-28 DIAGNOSIS — E04.1 THYROID NODULE: ICD-10-CM

## 2020-12-28 DIAGNOSIS — E66.01 CLASS 2 SEVERE OBESITY WITH SERIOUS COMORBIDITY AND BODY MASS INDEX (BMI) OF 35.0 TO 35.9 IN ADULT, UNSPECIFIED OBESITY TYPE: ICD-10-CM

## 2020-12-28 DIAGNOSIS — E10.9 TYPE 1 DIABETES MELLITUS WITHOUT COMPLICATION: ICD-10-CM

## 2020-12-28 PROCEDURE — 95251 CONT GLUC MNTR ANALYSIS I&R: CPT | Mod: S$GLB,,, | Performed by: NURSE PRACTITIONER

## 2020-12-28 PROCEDURE — 3008F PR BODY MASS INDEX (BMI) DOCUMENTED: ICD-10-PCS | Mod: CPTII,S$GLB,, | Performed by: NURSE PRACTITIONER

## 2020-12-28 PROCEDURE — 1126F PR PAIN SEVERITY QUANTIFIED, NO PAIN PRESENT: ICD-10-PCS | Mod: S$GLB,,, | Performed by: NURSE PRACTITIONER

## 2020-12-28 PROCEDURE — 99999 PR PBB SHADOW E&M-EST. PATIENT-LVL IV: ICD-10-PCS | Mod: PBBFAC,,, | Performed by: NURSE PRACTITIONER

## 2020-12-28 PROCEDURE — 3008F BODY MASS INDEX DOCD: CPT | Mod: CPTII,S$GLB,, | Performed by: NURSE PRACTITIONER

## 2020-12-28 PROCEDURE — 99999 PR PBB SHADOW E&M-EST. PATIENT-LVL IV: CPT | Mod: PBBFAC,,, | Performed by: NURSE PRACTITIONER

## 2020-12-28 PROCEDURE — 3051F HG A1C>EQUAL 7.0%<8.0%: CPT | Mod: CPTII,S$GLB,, | Performed by: NURSE PRACTITIONER

## 2020-12-28 PROCEDURE — 1126F AMNT PAIN NOTED NONE PRSNT: CPT | Mod: S$GLB,,, | Performed by: NURSE PRACTITIONER

## 2020-12-28 PROCEDURE — 99214 OFFICE O/P EST MOD 30 MIN: CPT | Mod: 25,S$GLB,, | Performed by: NURSE PRACTITIONER

## 2020-12-28 PROCEDURE — 95251 PR GLUCOSE MONITOR, 72 HOUR, PHYS INTERP: ICD-10-PCS | Mod: S$GLB,,, | Performed by: NURSE PRACTITIONER

## 2020-12-28 PROCEDURE — 99214 PR OFFICE/OUTPT VISIT, EST, LEVL IV, 30-39 MIN: ICD-10-PCS | Mod: 25,S$GLB,, | Performed by: NURSE PRACTITIONER

## 2020-12-28 PROCEDURE — 3051F PR MOST RECENT HEMOGLOBIN A1C LEVEL 7.0 - < 8.0%: ICD-10-PCS | Mod: CPTII,S$GLB,, | Performed by: NURSE PRACTITIONER

## 2020-12-28 RX ORDER — INSULIN DEGLUDEC 200 U/ML
22 INJECTION, SOLUTION SUBCUTANEOUS DAILY
Qty: 4 SYRINGE | Refills: 3 | Status: SHIPPED | OUTPATIENT
Start: 2020-12-28 | End: 2021-01-27

## 2020-12-28 RX ORDER — INSULIN ASPART 100 [IU]/ML
10 INJECTION, SOLUTION INTRAVENOUS; SUBCUTANEOUS
Qty: 5.4 ML | Refills: 3 | Status: SHIPPED | OUTPATIENT
Start: 2020-12-28 | End: 2021-06-11 | Stop reason: SDUPTHER

## 2020-12-28 RX ORDER — DULAGLUTIDE 0.75 MG/.5ML
0.75 INJECTION, SOLUTION SUBCUTANEOUS
Qty: 4 PEN | Refills: 5 | Status: SHIPPED | OUTPATIENT
Start: 2020-12-28 | End: 2021-06-11 | Stop reason: SDUPTHER

## 2020-12-28 RX ORDER — GABAPENTIN 100 MG/1
100 CAPSULE ORAL 3 TIMES DAILY
Qty: 90 CAPSULE | Refills: 11 | Status: SHIPPED | OUTPATIENT
Start: 2020-12-28 | End: 2022-01-27

## 2020-12-28 NOTE — PROGRESS NOTES
Subjective:      Patient ID: Kalpana Rivera is a 60 y.o. female.    Chief Complaint:  Follow-up    History of Present Illness  Kalpana Rivera presents today for follow up of now managed as type 1 diabetic-insulin dependent. Previous patient of Dr. Zeng. Last seen in Aug 2020. This is her first visit with me.      Ref. Range 9/14/2020 07:45   C-Peptide Latest Ref Range: 0.80 - 3.85 ng/mL 0.10 (L)      Ref. Range 9/14/2020 07:45   Glucose Latest Ref Range: 65 - 99 mg/dL 154 (H)     Glutamate decarboxylase 65 Ab Latest Ref Range: <5 IU/mL 97 (H)     With regards to the diabetes:    Diagnosed with diabetes age 24; diagnosed as JULIANN in 2020  Started insulin during first pregnancy and able to come off. In 1992 had son and has been on insulin since that time  Nephew is type 1  Family History of Type 2 DM: mother and father  Known complications:  DKA/HHS: twice in the past  RN: radha; up to date with eye exam  PN: denies  Nephropathy: due  Gastroparesis: denies  CAD: denies    Current regimen:  Tresiba  24 units daily  Metformin 1000 mg twice daily  Novolog 6/8/10 -she takes 6-12 units before meals and has decreased her insulin  Trulicity 0.75 mg weekly- has gotten passed the nausea with Trulicity-states she is feeling the satiety effects that comes and goes  Noticed better BGs since Trulicity    Has been taking novolog 10-15 minutes prior to a meal most days. States she is afraid to give insulin at times before a meal because she is afraid she will drop.   Missed doses-denies     Other medications tried:  Trulicity 1.5 mg weekly- very low appetite so stopped  levemir    4 times daily  See media tab.   She loves the dexcom.     Hypoglycemic event <1.5%  Yes, did not need assistance   Knows how to correct with 15 grams of carbs- juice, coke, or a peppermint.     Dietary recall: She feels that she is following diabetic diet  Eats 3 meals a day. States that she has noticed portion sizes decreased because she is not  as hungry.  B: greek yogurt with splenda or protein shake -premier  L: healthy choice or grilled cheese sandwich  D: same as lunch  She does not cook often but has some frozen foods.   She has been working from home.   Snacks: not really snacking as much anymore; pickles and cashews; carrots  Drinks: water, green tea, and diet coke    Exercise - tried to stay active. No formal exercise. She knows that she needs to use her treadmill.     Education - last visit: [facility]    Has a Medic alert tag-does not have  Glucagon/Baqsimi-declines; lives alone    Diabetes Management Status  Statin: Not taking  ACE/ARB: Not taking    Of note, she has been having left upper leg numbness more recently. Has been told she had sciatica in the past. Has used gabapentin in the past with success but did not like side effect profile.     States last A1c (not in our system) was 8.3%  Lab Results   Component Value Date    HGBA1C 7.0 (H) 12/08/2020    HGBA1C 7.6 (H) 09/14/2020     Screening or Prevention Patient's value Goal Complete/Controlled?   HgA1C Testing and Control   Lab Results   Component Value Date    HGBA1C 7.0 (H) 12/08/2020      Annually/Less than 8% Yes   Lipid profile : 09/14/2020 Annually Yes   LDL control Lab Results   Component Value Date    LDLCALC 46 09/14/2020    Annually/Less than 100 mg/dl  Yes   Nephropathy screening No results found for: LABMICR  No results found for: PROTEINUA Annually No   Blood pressure BP Readings from Last 1 Encounters:   12/28/20 122/72    Less than 140/90 Yes   Dilated retinal exam : 01/01/2020 Annually Yes   Foot exam   : 08/10/2020 Annually Yes     With regards to thyroid nodule,     Remembers being told in the past that she had a thyroid nodule and needed biopsy which she did not do. Denies hoarseness or dysphagia. No radiation to head or neck. No history of thyroid cancer in family.     Thyroid US 11.3.2020  RIGHT LOBE:   Right lobe measures 5.53 x 1.59 x 1.85 cm.   Mid lobe  "nodule measures 0.66 x 0.63 x 0.69 cm.Hypoechoic.   LEFT LOBE:   Left lobe measures 4.78 x 1.47 x 1.49 cm.   ISTHMUS:  Isthmus measures 0.21 cm.   LYMPH NODES:  Observed lymph nodes appear benign.   COMPARISON:  None   Impression:   Enlarged right lobe with a well-defined subcentimeter hypoechoic nodule in the midportion of the right lobe. Small cysts bilaterally.   RECOMMENDATIONS:  Repeat thyroid ultrasound in 2 years.    Not on medications for cholesterol     Ref. Range 9/14/2020 07:45   Cholesterol Latest Ref Range: <200 mg/dL 122   HDL Latest Ref Range: > OR = 50 mg/dL 63   Hdl/Cholesterol Ratio Latest Ref Range: <5.0 (calc) 1.9   LDL Cholesterol External Latest Units: mg/dL (calc) 46   Non HDL Chol. (LDL+VLDL) Latest Ref Range: <130 mg/dL (calc) 59   Triglycerides Latest Ref Range: <150 mg/dL 44     Review of Systems   Constitutional: Negative for fatigue.   Eyes: Negative for visual disturbance.   Respiratory: Negative for shortness of breath.    Cardiovascular: Negative for chest pain.   Gastrointestinal: Negative for abdominal pain.   Musculoskeletal: Negative for arthralgias.   Skin: Negative for wound.   Neurological: Negative for headaches.   Hematological: Does not bruise/bleed easily.   Psychiatric/Behavioral: Negative for sleep disturbance.     Objective:   Physical Exam  Vitals signs reviewed.   Constitutional:       Appearance: She is well-developed.   Neck:      Thyroid: No thyromegaly.   Cardiovascular:      Rate and Rhythm: Normal rate.   Pulmonary:      Effort: Pulmonary effort is normal.   Abdominal:      Palpations: Abdomen is soft.     injection sites are without edema or erythema. No lipo hypertropthy or atrophy  Diabetes Foot Exam:   Denies sores or lesions to bilateral feet  Shoes appropriate  DM foot exam deferred; done in Aug 2020    Visit Vitals  /72   Ht 4' 11" (1.499 m)   Wt 78.1 kg (172 lb 2.9 oz)   BMI 34.78 kg/m²        Lab Review:   Lab Results   Component Value Date    " HGBA1C 7.0 (H) 12/08/2020    HGBA1C 7.6 (H) 09/14/2020      Lab Results   Component Value Date    CHOL 122 09/14/2020    HDL 63 09/14/2020    LDLCALC 46 09/14/2020    TRIG 44 09/14/2020    CHOLHDL 1.9 09/14/2020     Lab Results   Component Value Date     12/08/2020    K 4.0 12/08/2020     12/08/2020    CO2 27 12/08/2020    GLU 86 12/08/2020    BUN 8 12/08/2020    CREATININE 0.56 12/08/2020    CALCIUM 9.0 12/08/2020    PROT 6.0 (L) 12/08/2020    ALBUMIN 4.0 12/08/2020    BILITOT 0.4 12/08/2020    AST 16 12/08/2020    ALT 17 12/08/2020    ESTGFRAFRICA 117 12/08/2020    EGFRNONAA 101 12/08/2020    TSH 1.80 09/14/2020     No results found for: [id number]  Assessment and Plan     1. Type 1 diabetes mellitus without complication  NOVOLOG FLEXPEN U-100 INSULIN 100 unit/mL (3 mL) InPn pen    TRESIBA FLEXTOUCH U-200 200 unit/mL (3 mL) InPn    dulaglutide (TRULICITY) 0.75 mg/0.5 mL pen injector    Comprehensive Metabolic Panel    Hemoglobin A1C    Lipid Panel    gabapentin (NEURONTIN) 100 MG capsule    Microalbumin/Creatinine Ratio, Urine   2. Thyroid nodule     3. Class 2 severe obesity with serious comorbidity and body mass index (BMI) of 35.0 to 35.9 in adult, unspecified obesity type     4. Type 2 diabetes mellitus without complication, with long-term current use of insulin         Type 1 diabetes mellitus  -- Reviewed goals of therapy are to get the best control we can without hypoglycemia  -- Treat as type 1 diabetic. Continue Trulicity due to satiety effects which may help with weight loss.  -- Dexcom review shows some hypoglycemia overnight and throughout the day.  Medication changes:   Decrease Tresiba to 22 units daily  Continue   Metformin 1000 mg twice daily  Novolog 6/8/10 units before meals  Trulicity 0.75 mg weekly    Start   Gabapentin 100 mg at night    Will download dexcom in a couple of weeks to determine need for changes.  -- Please try to give insulin at least 10 minutes before a meal.  Discussed Vgo and she will let us know in the future  -- Reviewed patient's current insulin regimen. Clarified proper insulin dose and timing in relation to meals, etc. Insulin injection sites and proper rotation instructed.    -- Advised frequent self blood glucose monitoring.  Patient encouraged to document glucose results and bring them to every clinic visit    -- Hypoglycemia precautions discussed. Instructed on precautions before driving.    -- Call for Bg repeatedly < 90 or > 180.   -- Close adherence to lifestyle changes recommended.   -- Periodic follow ups for eye evaluations, foot care and dental care suggested.  -- Given information on diabetic snacks and hypoglycemia previously      Thyroid nodule  -- Small stable nodule on US in 2020  -- Repeat thyroid US in 2 years    Class 2 severe obesity with serious comorbidity in adult  Continue Trulicity as above  Has had 5 pound weight loss since last visit      Follow up in about 3 months (around 3/28/2021).  Labs prior to visit

## 2020-12-28 NOTE — PATIENT INSTRUCTIONS
Diabetes  Decrease Tresiba to 22 units daily  Continue   Metformin 1000 mg twice daily  Novolog 6/8/10   Trulicity 0.75 mg weekly    Start   Gabapentin 100 mg at night    Will download dexcom in a couple of weeks to determine need for changes.

## 2020-12-28 NOTE — ASSESSMENT & PLAN NOTE
-- Reviewed goals of therapy are to get the best control we can without hypoglycemia  -- Treat as type 1 diabetic. Continue Trulicity due to satiety effects which may help with weight loss.  -- Dexcom review shows some hypoglycemia overnight and throughout the day.  Medication changes:   Decrease Tresiba to 22 units daily  Continue   Metformin 1000 mg twice daily  Novolog 6/8/10 units before meals  Trulicity 0.75 mg weekly    Start   Gabapentin 100 mg at night    Will download dexcom in a couple of weeks to determine need for changes.  -- Please try to give insulin at least 10 minutes before a meal. Discussed Vgo and she will let us know in the future  -- Reviewed patient's current insulin regimen. Clarified proper insulin dose and timing in relation to meals, etc. Insulin injection sites and proper rotation instructed.    -- Advised frequent self blood glucose monitoring.  Patient encouraged to document glucose results and bring them to every clinic visit    -- Hypoglycemia precautions discussed. Instructed on precautions before driving.    -- Call for Bg repeatedly < 90 or > 180.   -- Close adherence to lifestyle changes recommended.   -- Periodic follow ups for eye evaluations, foot care and dental care suggested.  -- Given information on diabetic snacks and hypoglycemia previously

## 2021-01-25 ENCOUNTER — PATIENT MESSAGE (OUTPATIENT)
Dept: ENDOCRINOLOGY | Facility: CLINIC | Age: 61
End: 2021-01-25

## 2021-02-17 ENCOUNTER — PATIENT MESSAGE (OUTPATIENT)
Dept: ENDOCRINOLOGY | Facility: CLINIC | Age: 61
End: 2021-02-17

## 2021-03-09 ENCOUNTER — IMMUNIZATION (OUTPATIENT)
Dept: FAMILY MEDICINE | Facility: CLINIC | Age: 61
End: 2021-03-09
Payer: COMMERCIAL

## 2021-03-09 DIAGNOSIS — Z23 NEED FOR VACCINATION: Primary | ICD-10-CM

## 2021-03-09 PROCEDURE — 91300 COVID-19, MRNA, LNP-S, PF, 30 MCG/0.3 ML DOSE VACCINE: CPT | Mod: PBBFAC | Performed by: FAMILY MEDICINE

## 2021-03-11 ENCOUNTER — PATIENT MESSAGE (OUTPATIENT)
Dept: ENDOCRINOLOGY | Facility: CLINIC | Age: 61
End: 2021-03-11

## 2021-03-12 DIAGNOSIS — E10.9 TYPE 1 DIABETES MELLITUS WITHOUT COMPLICATION: Primary | ICD-10-CM

## 2021-03-12 DIAGNOSIS — M10.9 GOUT, UNSPECIFIED CAUSE, UNSPECIFIED CHRONICITY, UNSPECIFIED SITE: ICD-10-CM

## 2021-03-27 LAB
ALBUMIN SERPL-MCNC: 4 G/DL (ref 3.6–5.1)
ALBUMIN/CREAT UR: ABNORMAL MCG/MG CREAT
ALBUMIN/GLOB SERPL: 1.8 (CALC) (ref 1–2.5)
ALP SERPL-CCNC: 69 U/L (ref 37–153)
ALT SERPL-CCNC: 19 U/L (ref 6–29)
AST SERPL-CCNC: 17 U/L (ref 10–35)
BILIRUB SERPL-MCNC: 0.4 MG/DL (ref 0.2–1.2)
BUN SERPL-MCNC: 8 MG/DL (ref 7–25)
BUN/CREAT SERPL: ABNORMAL (CALC) (ref 6–22)
CALCIUM SERPL-MCNC: 9.6 MG/DL (ref 8.6–10.4)
CHLORIDE SERPL-SCNC: 103 MMOL/L (ref 98–110)
CHOLEST SERPL-MCNC: 140 MG/DL
CHOLEST/HDLC SERPL: 2.2 (CALC)
CO2 SERPL-SCNC: 31 MMOL/L (ref 20–32)
CREAT SERPL-MCNC: 0.6 MG/DL (ref 0.5–0.99)
CREAT UR-MCNC: 12 MG/DL (ref 20–275)
GFRSERPLBLD MDRD-ARVRAT: 98 ML/MIN/1.73M2
GLOBULIN SER CALC-MCNC: 2.2 G/DL (CALC) (ref 1.9–3.7)
GLUCOSE SERPL-MCNC: 164 MG/DL (ref 65–99)
HBA1C MFR BLD: 6.5 % OF TOTAL HGB
HDLC SERPL-MCNC: 65 MG/DL
LDLC SERPL CALC-MCNC: 63 MG/DL (CALC)
MICROALBUMIN UR-MCNC: <0.2 MG/DL
NONHDLC SERPL-MCNC: 75 MG/DL (CALC)
POTASSIUM SERPL-SCNC: 4.4 MMOL/L (ref 3.5–5.3)
PROT SERPL-MCNC: 6.2 G/DL (ref 6.1–8.1)
SODIUM SERPL-SCNC: 142 MMOL/L (ref 135–146)
TRIGL SERPL-MCNC: 50 MG/DL
URATE SERPL-MCNC: 2.8 MG/DL (ref 2.5–7)

## 2021-03-28 ENCOUNTER — PATIENT MESSAGE (OUTPATIENT)
Dept: ENDOCRINOLOGY | Facility: CLINIC | Age: 61
End: 2021-03-28

## 2021-03-30 ENCOUNTER — IMMUNIZATION (OUTPATIENT)
Dept: FAMILY MEDICINE | Facility: CLINIC | Age: 61
End: 2021-03-30
Payer: COMMERCIAL

## 2021-03-30 DIAGNOSIS — Z23 NEED FOR VACCINATION: Primary | ICD-10-CM

## 2021-03-30 PROCEDURE — 91300 COVID-19, MRNA, LNP-S, PF, 30 MCG/0.3 ML DOSE VACCINE: CPT | Mod: PBBFAC | Performed by: FAMILY MEDICINE

## 2021-03-30 PROCEDURE — 0002A COVID-19, MRNA, LNP-S, PF, 30 MCG/0.3 ML DOSE VACCINE: CPT | Mod: PBBFAC | Performed by: FAMILY MEDICINE

## 2021-04-07 ENCOUNTER — OFFICE VISIT (OUTPATIENT)
Dept: ENDOCRINOLOGY | Facility: CLINIC | Age: 61
End: 2021-04-07
Payer: COMMERCIAL

## 2021-04-07 ENCOUNTER — PATIENT MESSAGE (OUTPATIENT)
Dept: ENDOCRINOLOGY | Facility: CLINIC | Age: 61
End: 2021-04-07

## 2021-04-07 DIAGNOSIS — E10.9 TYPE 1 DIABETES MELLITUS WITHOUT COMPLICATION: ICD-10-CM

## 2021-04-07 DIAGNOSIS — E04.1 THYROID NODULE: ICD-10-CM

## 2021-04-07 DIAGNOSIS — E66.01 CLASS 2 SEVERE OBESITY WITH SERIOUS COMORBIDITY AND BODY MASS INDEX (BMI) OF 35.0 TO 35.9 IN ADULT, UNSPECIFIED OBESITY TYPE: ICD-10-CM

## 2021-04-07 PROCEDURE — 3044F HG A1C LEVEL LT 7.0%: CPT | Mod: CPTII,,, | Performed by: NURSE PRACTITIONER

## 2021-04-07 PROCEDURE — 3044F PR MOST RECENT HEMOGLOBIN A1C LEVEL <7.0%: ICD-10-PCS | Mod: CPTII,,, | Performed by: NURSE PRACTITIONER

## 2021-04-07 PROCEDURE — 99214 PR OFFICE/OUTPT VISIT, EST, LEVL IV, 30-39 MIN: ICD-10-PCS | Mod: 95,,, | Performed by: NURSE PRACTITIONER

## 2021-04-07 PROCEDURE — 99214 OFFICE O/P EST MOD 30 MIN: CPT | Mod: 95,,, | Performed by: NURSE PRACTITIONER

## 2021-04-07 RX ORDER — INSULIN DEGLUDEC 100 U/ML
20 INJECTION, SOLUTION SUBCUTANEOUS DAILY
Qty: 5 SYRINGE | Refills: 3 | Status: SHIPPED | OUTPATIENT
Start: 2021-04-07 | End: 2021-05-07

## 2021-04-16 ENCOUNTER — PATIENT MESSAGE (OUTPATIENT)
Dept: ENDOCRINOLOGY | Facility: CLINIC | Age: 61
End: 2021-04-16

## 2021-06-11 ENCOUNTER — PATIENT MESSAGE (OUTPATIENT)
Dept: ENDOCRINOLOGY | Facility: CLINIC | Age: 61
End: 2021-06-11

## 2021-06-11 DIAGNOSIS — E13.9 LADA (LATENT AUTOIMMUNE DIABETES IN ADULTS), MANAGED AS TYPE 1: Primary | ICD-10-CM

## 2021-06-11 DIAGNOSIS — E10.9 TYPE 1 DIABETES MELLITUS WITHOUT COMPLICATION: ICD-10-CM

## 2021-06-11 RX ORDER — DULAGLUTIDE 0.75 MG/.5ML
0.75 INJECTION, SOLUTION SUBCUTANEOUS
Qty: 4 PEN | Refills: 5 | Status: SHIPPED | OUTPATIENT
Start: 2021-06-11 | End: 2022-05-16 | Stop reason: SDUPTHER

## 2021-06-11 RX ORDER — INSULIN ASPART 100 [IU]/ML
10 INJECTION, SOLUTION INTRAVENOUS; SUBCUTANEOUS
Qty: 5.4 ML | Refills: 3 | Status: SHIPPED | OUTPATIENT
Start: 2021-06-11 | End: 2021-11-05

## 2021-06-11 RX ORDER — INSULIN DEGLUDEC 200 U/ML
22 INJECTION, SOLUTION SUBCUTANEOUS DAILY
Qty: 2 PEN | Refills: 6 | Status: SHIPPED | OUTPATIENT
Start: 2021-06-11 | End: 2022-06-15

## 2021-07-07 RX ORDER — PEN NEEDLE, DIABETIC 31 GX5/16"
NEEDLE, DISPOSABLE MISCELLANEOUS
Qty: 100 EACH | Refills: 3 | Status: SHIPPED | OUTPATIENT
Start: 2021-07-07 | End: 2021-11-01

## 2021-07-13 LAB — PAP RECOMMENDATION EXT: NORMAL

## 2021-07-30 ENCOUNTER — PATIENT MESSAGE (OUTPATIENT)
Dept: ENDOCRINOLOGY | Facility: CLINIC | Age: 61
End: 2021-07-30

## 2021-08-04 ENCOUNTER — PATIENT MESSAGE (OUTPATIENT)
Dept: ENDOCRINOLOGY | Facility: CLINIC | Age: 61
End: 2021-08-04

## 2021-08-09 ENCOUNTER — OFFICE VISIT (OUTPATIENT)
Dept: ENDOCRINOLOGY | Facility: CLINIC | Age: 61
End: 2021-08-09
Payer: COMMERCIAL

## 2021-08-09 DIAGNOSIS — E66.01 CLASS 2 SEVERE OBESITY WITH SERIOUS COMORBIDITY AND BODY MASS INDEX (BMI) OF 35.0 TO 35.9 IN ADULT, UNSPECIFIED OBESITY TYPE: ICD-10-CM

## 2021-08-09 DIAGNOSIS — E10.9 TYPE 1 DIABETES MELLITUS WITHOUT COMPLICATION: Primary | ICD-10-CM

## 2021-08-09 DIAGNOSIS — E04.1 THYROID NODULE: ICD-10-CM

## 2021-08-09 PROCEDURE — 3044F PR MOST RECENT HEMOGLOBIN A1C LEVEL <7.0%: ICD-10-PCS | Mod: CPTII,,, | Performed by: NURSE PRACTITIONER

## 2021-08-09 PROCEDURE — 1160F RVW MEDS BY RX/DR IN RCRD: CPT | Mod: CPTII,,, | Performed by: NURSE PRACTITIONER

## 2021-08-09 PROCEDURE — 1159F PR MEDICATION LIST DOCUMENTED IN MEDICAL RECORD: ICD-10-PCS | Mod: CPTII,,, | Performed by: NURSE PRACTITIONER

## 2021-08-09 PROCEDURE — 1159F MED LIST DOCD IN RCRD: CPT | Mod: CPTII,,, | Performed by: NURSE PRACTITIONER

## 2021-08-09 PROCEDURE — 99214 PR OFFICE/OUTPT VISIT, EST, LEVL IV, 30-39 MIN: ICD-10-PCS | Mod: 95,,, | Performed by: NURSE PRACTITIONER

## 2021-08-09 PROCEDURE — 3044F HG A1C LEVEL LT 7.0%: CPT | Mod: CPTII,,, | Performed by: NURSE PRACTITIONER

## 2021-08-09 PROCEDURE — 99214 OFFICE O/P EST MOD 30 MIN: CPT | Mod: 95,,, | Performed by: NURSE PRACTITIONER

## 2021-08-09 PROCEDURE — 1160F PR REVIEW ALL MEDS BY PRESCRIBER/CLIN PHARMACIST DOCUMENTED: ICD-10-PCS | Mod: CPTII,,, | Performed by: NURSE PRACTITIONER

## 2021-08-11 ENCOUNTER — PATIENT MESSAGE (OUTPATIENT)
Dept: ENDOCRINOLOGY | Facility: CLINIC | Age: 61
End: 2021-08-11

## 2021-08-16 ENCOUNTER — OFFICE VISIT (OUTPATIENT)
Dept: PODIATRY | Facility: CLINIC | Age: 61
End: 2021-08-16
Payer: COMMERCIAL

## 2021-08-16 VITALS
DIASTOLIC BLOOD PRESSURE: 70 MMHG | WEIGHT: 175.25 LBS | SYSTOLIC BLOOD PRESSURE: 120 MMHG | OXYGEN SATURATION: 97 % | BODY MASS INDEX: 35.33 KG/M2 | HEIGHT: 59 IN | HEART RATE: 121 BPM

## 2021-08-16 DIAGNOSIS — M79.671 RIGHT FOOT PAIN: ICD-10-CM

## 2021-08-16 DIAGNOSIS — E11.9 COMPREHENSIVE DIABETIC FOOT EXAMINATION, TYPE 2 DM, ENCOUNTER FOR: ICD-10-CM

## 2021-08-16 DIAGNOSIS — E11.9 TYPE 2 DIABETES MELLITUS WITHOUT COMPLICATION, WITH LONG-TERM CURRENT USE OF INSULIN: Primary | ICD-10-CM

## 2021-08-16 DIAGNOSIS — Z79.4 TYPE 2 DIABETES MELLITUS WITHOUT COMPLICATION, WITH LONG-TERM CURRENT USE OF INSULIN: Primary | ICD-10-CM

## 2021-08-16 PROCEDURE — 99999 PR PBB SHADOW E&M-EST. PATIENT-LVL IV: CPT | Mod: PBBFAC,,, | Performed by: PODIATRIST

## 2021-08-16 PROCEDURE — 99999 PR PBB SHADOW E&M-EST. PATIENT-LVL IV: ICD-10-PCS | Mod: PBBFAC,,, | Performed by: PODIATRIST

## 2021-08-16 PROCEDURE — 3078F DIAST BP <80 MM HG: CPT | Mod: CPTII,S$GLB,, | Performed by: PODIATRIST

## 2021-08-16 PROCEDURE — 3044F HG A1C LEVEL LT 7.0%: CPT | Mod: CPTII,S$GLB,, | Performed by: PODIATRIST

## 2021-08-16 PROCEDURE — 1125F AMNT PAIN NOTED PAIN PRSNT: CPT | Mod: CPTII,S$GLB,, | Performed by: PODIATRIST

## 2021-08-16 PROCEDURE — 1159F PR MEDICATION LIST DOCUMENTED IN MEDICAL RECORD: ICD-10-PCS | Mod: CPTII,S$GLB,, | Performed by: PODIATRIST

## 2021-08-16 PROCEDURE — 99203 PR OFFICE/OUTPT VISIT, NEW, LEVL III, 30-44 MIN: ICD-10-PCS | Mod: S$GLB,,, | Performed by: PODIATRIST

## 2021-08-16 PROCEDURE — 1160F PR REVIEW ALL MEDS BY PRESCRIBER/CLIN PHARMACIST DOCUMENTED: ICD-10-PCS | Mod: CPTII,S$GLB,, | Performed by: PODIATRIST

## 2021-08-16 PROCEDURE — 1159F MED LIST DOCD IN RCRD: CPT | Mod: CPTII,S$GLB,, | Performed by: PODIATRIST

## 2021-08-16 PROCEDURE — 1125F PR PAIN SEVERITY QUANTIFIED, PAIN PRESENT: ICD-10-PCS | Mod: CPTII,S$GLB,, | Performed by: PODIATRIST

## 2021-08-16 PROCEDURE — 3078F PR MOST RECENT DIASTOLIC BLOOD PRESSURE < 80 MM HG: ICD-10-PCS | Mod: CPTII,S$GLB,, | Performed by: PODIATRIST

## 2021-08-16 PROCEDURE — 1160F RVW MEDS BY RX/DR IN RCRD: CPT | Mod: CPTII,S$GLB,, | Performed by: PODIATRIST

## 2021-08-16 PROCEDURE — 3074F SYST BP LT 130 MM HG: CPT | Mod: CPTII,S$GLB,, | Performed by: PODIATRIST

## 2021-08-16 PROCEDURE — 3008F BODY MASS INDEX DOCD: CPT | Mod: CPTII,S$GLB,, | Performed by: PODIATRIST

## 2021-08-16 PROCEDURE — 3074F PR MOST RECENT SYSTOLIC BLOOD PRESSURE < 130 MM HG: ICD-10-PCS | Mod: CPTII,S$GLB,, | Performed by: PODIATRIST

## 2021-08-16 PROCEDURE — 3044F PR MOST RECENT HEMOGLOBIN A1C LEVEL <7.0%: ICD-10-PCS | Mod: CPTII,S$GLB,, | Performed by: PODIATRIST

## 2021-08-16 PROCEDURE — 3008F PR BODY MASS INDEX (BMI) DOCUMENTED: ICD-10-PCS | Mod: CPTII,S$GLB,, | Performed by: PODIATRIST

## 2021-08-16 PROCEDURE — 99203 OFFICE O/P NEW LOW 30 MIN: CPT | Mod: S$GLB,,, | Performed by: PODIATRIST

## 2021-10-05 DIAGNOSIS — M25.561 RIGHT KNEE PAIN, UNSPECIFIED CHRONICITY: Primary | ICD-10-CM

## 2021-10-08 ENCOUNTER — PATIENT MESSAGE (OUTPATIENT)
Dept: ENDOCRINOLOGY | Facility: CLINIC | Age: 61
End: 2021-10-08

## 2021-10-08 ENCOUNTER — TELEPHONE (OUTPATIENT)
Dept: SPORTS MEDICINE | Facility: CLINIC | Age: 61
End: 2021-10-08

## 2021-10-08 RX ORDER — BLOOD-GLUCOSE TRANSMITTER
2 EACH MISCELLANEOUS
Qty: 2 DEVICE | Refills: 1 | Status: SHIPPED | OUTPATIENT
Start: 2021-10-08 | End: 2022-05-16 | Stop reason: SDUPTHER

## 2021-10-11 ENCOUNTER — OFFICE VISIT (OUTPATIENT)
Dept: SPORTS MEDICINE | Facility: CLINIC | Age: 61
End: 2021-10-11
Payer: COMMERCIAL

## 2021-10-11 ENCOUNTER — TELEPHONE (OUTPATIENT)
Dept: SPORTS MEDICINE | Facility: CLINIC | Age: 61
End: 2021-10-11

## 2021-10-11 VITALS
HEIGHT: 59 IN | DIASTOLIC BLOOD PRESSURE: 80 MMHG | BODY MASS INDEX: 34.45 KG/M2 | SYSTOLIC BLOOD PRESSURE: 120 MMHG | WEIGHT: 170.88 LBS | HEART RATE: 90 BPM

## 2021-10-11 DIAGNOSIS — M17.11 PRIMARY OSTEOARTHRITIS OF RIGHT KNEE: Primary | ICD-10-CM

## 2021-10-11 PROCEDURE — 1159F MED LIST DOCD IN RCRD: CPT | Mod: CPTII,S$GLB,, | Performed by: STUDENT IN AN ORGANIZED HEALTH CARE EDUCATION/TRAINING PROGRAM

## 2021-10-11 PROCEDURE — 99999 PR PBB SHADOW E&M-EST. PATIENT-LVL III: CPT | Mod: PBBFAC,,, | Performed by: STUDENT IN AN ORGANIZED HEALTH CARE EDUCATION/TRAINING PROGRAM

## 2021-10-11 PROCEDURE — 3066F PR DOCUMENTATION OF TREATMENT FOR NEPHROPATHY: ICD-10-PCS | Mod: CPTII,S$GLB,, | Performed by: STUDENT IN AN ORGANIZED HEALTH CARE EDUCATION/TRAINING PROGRAM

## 2021-10-11 PROCEDURE — 99999 PR PBB SHADOW E&M-EST. PATIENT-LVL III: ICD-10-PCS | Mod: PBBFAC,,, | Performed by: STUDENT IN AN ORGANIZED HEALTH CARE EDUCATION/TRAINING PROGRAM

## 2021-10-11 PROCEDURE — 3044F HG A1C LEVEL LT 7.0%: CPT | Mod: CPTII,S$GLB,, | Performed by: STUDENT IN AN ORGANIZED HEALTH CARE EDUCATION/TRAINING PROGRAM

## 2021-10-11 PROCEDURE — 3008F PR BODY MASS INDEX (BMI) DOCUMENTED: ICD-10-PCS | Mod: CPTII,S$GLB,, | Performed by: STUDENT IN AN ORGANIZED HEALTH CARE EDUCATION/TRAINING PROGRAM

## 2021-10-11 PROCEDURE — 3079F DIAST BP 80-89 MM HG: CPT | Mod: CPTII,S$GLB,, | Performed by: STUDENT IN AN ORGANIZED HEALTH CARE EDUCATION/TRAINING PROGRAM

## 2021-10-11 PROCEDURE — 99204 OFFICE O/P NEW MOD 45 MIN: CPT | Mod: S$GLB,,, | Performed by: STUDENT IN AN ORGANIZED HEALTH CARE EDUCATION/TRAINING PROGRAM

## 2021-10-11 PROCEDURE — 3074F PR MOST RECENT SYSTOLIC BLOOD PRESSURE < 130 MM HG: ICD-10-PCS | Mod: CPTII,S$GLB,, | Performed by: STUDENT IN AN ORGANIZED HEALTH CARE EDUCATION/TRAINING PROGRAM

## 2021-10-11 PROCEDURE — 1160F RVW MEDS BY RX/DR IN RCRD: CPT | Mod: CPTII,S$GLB,, | Performed by: STUDENT IN AN ORGANIZED HEALTH CARE EDUCATION/TRAINING PROGRAM

## 2021-10-11 PROCEDURE — 1159F PR MEDICATION LIST DOCUMENTED IN MEDICAL RECORD: ICD-10-PCS | Mod: CPTII,S$GLB,, | Performed by: STUDENT IN AN ORGANIZED HEALTH CARE EDUCATION/TRAINING PROGRAM

## 2021-10-11 PROCEDURE — 3061F NEG MICROALBUMINURIA REV: CPT | Mod: CPTII,S$GLB,, | Performed by: STUDENT IN AN ORGANIZED HEALTH CARE EDUCATION/TRAINING PROGRAM

## 2021-10-11 PROCEDURE — 3061F PR NEG MICROALBUMINURIA RESULT DOCUMENTED/REVIEW: ICD-10-PCS | Mod: CPTII,S$GLB,, | Performed by: STUDENT IN AN ORGANIZED HEALTH CARE EDUCATION/TRAINING PROGRAM

## 2021-10-11 PROCEDURE — 99204 PR OFFICE/OUTPT VISIT, NEW, LEVL IV, 45-59 MIN: ICD-10-PCS | Mod: S$GLB,,, | Performed by: STUDENT IN AN ORGANIZED HEALTH CARE EDUCATION/TRAINING PROGRAM

## 2021-10-11 PROCEDURE — 3044F PR MOST RECENT HEMOGLOBIN A1C LEVEL <7.0%: ICD-10-PCS | Mod: CPTII,S$GLB,, | Performed by: STUDENT IN AN ORGANIZED HEALTH CARE EDUCATION/TRAINING PROGRAM

## 2021-10-11 PROCEDURE — 3074F SYST BP LT 130 MM HG: CPT | Mod: CPTII,S$GLB,, | Performed by: STUDENT IN AN ORGANIZED HEALTH CARE EDUCATION/TRAINING PROGRAM

## 2021-10-11 PROCEDURE — 3008F BODY MASS INDEX DOCD: CPT | Mod: CPTII,S$GLB,, | Performed by: STUDENT IN AN ORGANIZED HEALTH CARE EDUCATION/TRAINING PROGRAM

## 2021-10-11 PROCEDURE — 3079F PR MOST RECENT DIASTOLIC BLOOD PRESSURE 80-89 MM HG: ICD-10-PCS | Mod: CPTII,S$GLB,, | Performed by: STUDENT IN AN ORGANIZED HEALTH CARE EDUCATION/TRAINING PROGRAM

## 2021-10-11 PROCEDURE — 3066F NEPHROPATHY DOC TX: CPT | Mod: CPTII,S$GLB,, | Performed by: STUDENT IN AN ORGANIZED HEALTH CARE EDUCATION/TRAINING PROGRAM

## 2021-10-11 PROCEDURE — 1160F PR REVIEW ALL MEDS BY PRESCRIBER/CLIN PHARMACIST DOCUMENTED: ICD-10-PCS | Mod: CPTII,S$GLB,, | Performed by: STUDENT IN AN ORGANIZED HEALTH CARE EDUCATION/TRAINING PROGRAM

## 2021-10-13 PROBLEM — Z78.9 IMPAIRED MOBILITY AND ACTIVITIES OF DAILY LIVING: Status: ACTIVE | Noted: 2021-10-13

## 2021-10-13 PROBLEM — Z74.09 IMPAIRED MOBILITY AND ACTIVITIES OF DAILY LIVING: Status: ACTIVE | Noted: 2021-10-13

## 2021-11-22 ENCOUNTER — OFFICE VISIT (OUTPATIENT)
Dept: SPORTS MEDICINE | Facility: CLINIC | Age: 61
End: 2021-11-22
Payer: COMMERCIAL

## 2021-11-22 VITALS
TEMPERATURE: 98 F | SYSTOLIC BLOOD PRESSURE: 119 MMHG | HEIGHT: 59 IN | WEIGHT: 170.44 LBS | DIASTOLIC BLOOD PRESSURE: 77 MMHG | BODY MASS INDEX: 34.36 KG/M2 | HEART RATE: 93 BPM

## 2021-11-22 DIAGNOSIS — M17.11 PRIMARY OSTEOARTHRITIS OF RIGHT KNEE: Primary | ICD-10-CM

## 2021-11-22 PROCEDURE — 3061F NEG MICROALBUMINURIA REV: CPT | Mod: CPTII,S$GLB,, | Performed by: STUDENT IN AN ORGANIZED HEALTH CARE EDUCATION/TRAINING PROGRAM

## 2021-11-22 PROCEDURE — 99213 PR OFFICE/OUTPT VISIT, EST, LEVL III, 20-29 MIN: ICD-10-PCS | Mod: S$GLB,,, | Performed by: STUDENT IN AN ORGANIZED HEALTH CARE EDUCATION/TRAINING PROGRAM

## 2021-11-22 PROCEDURE — 3066F NEPHROPATHY DOC TX: CPT | Mod: CPTII,S$GLB,, | Performed by: STUDENT IN AN ORGANIZED HEALTH CARE EDUCATION/TRAINING PROGRAM

## 2021-11-22 PROCEDURE — 99213 OFFICE O/P EST LOW 20 MIN: CPT | Mod: S$GLB,,, | Performed by: STUDENT IN AN ORGANIZED HEALTH CARE EDUCATION/TRAINING PROGRAM

## 2021-11-22 PROCEDURE — 99999 PR PBB SHADOW E&M-EST. PATIENT-LVL IV: ICD-10-PCS | Mod: PBBFAC,,, | Performed by: STUDENT IN AN ORGANIZED HEALTH CARE EDUCATION/TRAINING PROGRAM

## 2021-11-22 PROCEDURE — 3066F PR DOCUMENTATION OF TREATMENT FOR NEPHROPATHY: ICD-10-PCS | Mod: CPTII,S$GLB,, | Performed by: STUDENT IN AN ORGANIZED HEALTH CARE EDUCATION/TRAINING PROGRAM

## 2021-11-22 PROCEDURE — 99999 PR PBB SHADOW E&M-EST. PATIENT-LVL IV: CPT | Mod: PBBFAC,,, | Performed by: STUDENT IN AN ORGANIZED HEALTH CARE EDUCATION/TRAINING PROGRAM

## 2021-11-22 PROCEDURE — 3061F PR NEG MICROALBUMINURIA RESULT DOCUMENTED/REVIEW: ICD-10-PCS | Mod: CPTII,S$GLB,, | Performed by: STUDENT IN AN ORGANIZED HEALTH CARE EDUCATION/TRAINING PROGRAM

## 2021-11-23 DIAGNOSIS — U07.1 COVID: Primary | ICD-10-CM

## 2021-12-03 ENCOUNTER — PATIENT MESSAGE (OUTPATIENT)
Dept: ENDOCRINOLOGY | Facility: CLINIC | Age: 61
End: 2021-12-03
Payer: COMMERCIAL

## 2021-12-03 RX ORDER — METFORMIN HYDROCHLORIDE 500 MG/1
TABLET, EXTENDED RELEASE ORAL
Qty: 360 TABLET | Refills: 2 | Status: SHIPPED | OUTPATIENT
Start: 2021-12-03 | End: 2022-08-26 | Stop reason: SDUPTHER

## 2021-12-06 ENCOUNTER — PATIENT MESSAGE (OUTPATIENT)
Dept: ENDOCRINOLOGY | Facility: CLINIC | Age: 61
End: 2021-12-06
Payer: COMMERCIAL

## 2021-12-10 ENCOUNTER — OFFICE VISIT (OUTPATIENT)
Dept: ENDOCRINOLOGY | Facility: CLINIC | Age: 61
End: 2021-12-10
Payer: COMMERCIAL

## 2021-12-10 ENCOUNTER — TELEPHONE (OUTPATIENT)
Dept: DIABETES | Facility: CLINIC | Age: 61
End: 2021-12-10
Payer: COMMERCIAL

## 2021-12-10 DIAGNOSIS — E66.01 CLASS 2 SEVERE OBESITY WITH SERIOUS COMORBIDITY AND BODY MASS INDEX (BMI) OF 35.0 TO 35.9 IN ADULT, UNSPECIFIED OBESITY TYPE: ICD-10-CM

## 2021-12-10 DIAGNOSIS — E10.9 TYPE 1 DIABETES MELLITUS WITHOUT COMPLICATION: ICD-10-CM

## 2021-12-10 DIAGNOSIS — E04.1 THYROID NODULE: ICD-10-CM

## 2021-12-10 PROCEDURE — 99214 PR OFFICE/OUTPT VISIT, EST, LEVL IV, 30-39 MIN: ICD-10-PCS | Mod: 95,,, | Performed by: NURSE PRACTITIONER

## 2021-12-10 PROCEDURE — 3061F PR NEG MICROALBUMINURIA RESULT DOCUMENTED/REVIEW: ICD-10-PCS | Mod: CPTII,95,, | Performed by: NURSE PRACTITIONER

## 2021-12-10 PROCEDURE — 99214 OFFICE O/P EST MOD 30 MIN: CPT | Mod: 95,,, | Performed by: NURSE PRACTITIONER

## 2021-12-10 PROCEDURE — 3066F NEPHROPATHY DOC TX: CPT | Mod: CPTII,95,, | Performed by: NURSE PRACTITIONER

## 2021-12-10 PROCEDURE — 3061F NEG MICROALBUMINURIA REV: CPT | Mod: CPTII,95,, | Performed by: NURSE PRACTITIONER

## 2021-12-10 PROCEDURE — 3066F PR DOCUMENTATION OF TREATMENT FOR NEPHROPATHY: ICD-10-PCS | Mod: CPTII,95,, | Performed by: NURSE PRACTITIONER

## 2022-01-11 ENCOUNTER — OFFICE VISIT (OUTPATIENT)
Dept: SPORTS MEDICINE | Facility: CLINIC | Age: 62
End: 2022-01-11
Payer: COMMERCIAL

## 2022-01-11 VITALS
SYSTOLIC BLOOD PRESSURE: 118 MMHG | BODY MASS INDEX: 34.11 KG/M2 | WEIGHT: 169.19 LBS | HEART RATE: 92 BPM | DIASTOLIC BLOOD PRESSURE: 68 MMHG | HEIGHT: 59 IN

## 2022-01-11 DIAGNOSIS — M76.32 IT BAND SYNDROME, LEFT: Primary | ICD-10-CM

## 2022-01-11 PROCEDURE — 99999 PR PBB SHADOW E&M-EST. PATIENT-LVL IV: CPT | Mod: PBBFAC,,, | Performed by: STUDENT IN AN ORGANIZED HEALTH CARE EDUCATION/TRAINING PROGRAM

## 2022-01-11 PROCEDURE — 1160F RVW MEDS BY RX/DR IN RCRD: CPT | Mod: CPTII,S$GLB,, | Performed by: STUDENT IN AN ORGANIZED HEALTH CARE EDUCATION/TRAINING PROGRAM

## 2022-01-11 PROCEDURE — 3078F PR MOST RECENT DIASTOLIC BLOOD PRESSURE < 80 MM HG: ICD-10-PCS | Mod: CPTII,S$GLB,, | Performed by: STUDENT IN AN ORGANIZED HEALTH CARE EDUCATION/TRAINING PROGRAM

## 2022-01-11 PROCEDURE — 3008F BODY MASS INDEX DOCD: CPT | Mod: CPTII,S$GLB,, | Performed by: STUDENT IN AN ORGANIZED HEALTH CARE EDUCATION/TRAINING PROGRAM

## 2022-01-11 PROCEDURE — 3074F SYST BP LT 130 MM HG: CPT | Mod: CPTII,S$GLB,, | Performed by: STUDENT IN AN ORGANIZED HEALTH CARE EDUCATION/TRAINING PROGRAM

## 2022-01-11 PROCEDURE — 1159F PR MEDICATION LIST DOCUMENTED IN MEDICAL RECORD: ICD-10-PCS | Mod: CPTII,S$GLB,, | Performed by: STUDENT IN AN ORGANIZED HEALTH CARE EDUCATION/TRAINING PROGRAM

## 2022-01-11 PROCEDURE — 3078F DIAST BP <80 MM HG: CPT | Mod: CPTII,S$GLB,, | Performed by: STUDENT IN AN ORGANIZED HEALTH CARE EDUCATION/TRAINING PROGRAM

## 2022-01-11 PROCEDURE — 99214 OFFICE O/P EST MOD 30 MIN: CPT | Mod: S$GLB,,, | Performed by: STUDENT IN AN ORGANIZED HEALTH CARE EDUCATION/TRAINING PROGRAM

## 2022-01-11 PROCEDURE — 1160F PR REVIEW ALL MEDS BY PRESCRIBER/CLIN PHARMACIST DOCUMENTED: ICD-10-PCS | Mod: CPTII,S$GLB,, | Performed by: STUDENT IN AN ORGANIZED HEALTH CARE EDUCATION/TRAINING PROGRAM

## 2022-01-11 PROCEDURE — 3074F PR MOST RECENT SYSTOLIC BLOOD PRESSURE < 130 MM HG: ICD-10-PCS | Mod: CPTII,S$GLB,, | Performed by: STUDENT IN AN ORGANIZED HEALTH CARE EDUCATION/TRAINING PROGRAM

## 2022-01-11 PROCEDURE — 99999 PR PBB SHADOW E&M-EST. PATIENT-LVL IV: ICD-10-PCS | Mod: PBBFAC,,, | Performed by: STUDENT IN AN ORGANIZED HEALTH CARE EDUCATION/TRAINING PROGRAM

## 2022-01-11 PROCEDURE — 3008F PR BODY MASS INDEX (BMI) DOCUMENTED: ICD-10-PCS | Mod: CPTII,S$GLB,, | Performed by: STUDENT IN AN ORGANIZED HEALTH CARE EDUCATION/TRAINING PROGRAM

## 2022-01-11 PROCEDURE — 1159F MED LIST DOCD IN RCRD: CPT | Mod: CPTII,S$GLB,, | Performed by: STUDENT IN AN ORGANIZED HEALTH CARE EDUCATION/TRAINING PROGRAM

## 2022-01-11 PROCEDURE — 99214 PR OFFICE/OUTPT VISIT, EST, LEVL IV, 30-39 MIN: ICD-10-PCS | Mod: S$GLB,,, | Performed by: STUDENT IN AN ORGANIZED HEALTH CARE EDUCATION/TRAINING PROGRAM

## 2022-01-11 NOTE — PROGRESS NOTES
"Subjective:          Chief Complaint: Kalpana Rivera is a 61 y.o. female who had concerns including Pain of the Left Knee.    Kalpana Rivera is a 61 y.o. female presents for evaluation of her left knee.  She has previously seen with her right knee several months ago with early arthritis and she received 100% relief of her symptoms with physical therapy.  Currently for her left knee, she has pain along the lateral aspect of the knee and thigh.  This is over the IT band.  This began several weeks ago when she rolled over and felt a sharp shooting pain along the ITB band.  She says this has been present since.  It is worse with extended periods of standing.  Denies any instability or mechanical symptoms.  There is no mediolateral or joint line pain.  No radicular pains.    Past Medical History:   Diagnosis Date    Diabetes mellitus, type 2        Current Outpatient Medications on File Prior to Visit   Medication Sig Dispense Refill    b complex vitamins tablet Take 1 tablet by mouth once daily.      BD ULTRA-FINE SHORT PEN NEEDLE 31 gauge x 5/16" Ndle USE FOUR TIMES DAILY WITH INSULIN 100 each 3    blood-glucose transmitter (DEXCOM G6 TRANSMITTER) Brittany 2 Devices by Misc.(Non-Drug; Combo Route) route every 6 (six) months. 2 Device 1    cetirizine (ZYRTEC) 10 MG tablet Take 10 mg by mouth once daily.      dulaglutide (TRULICITY) 0.75 mg/0.5 mL pen injector Inject 0.75 mg into the skin every 7 days. 4 pen 5    fluticasone propionate (FLONASE) 50 mcg/actuation nasal spray 1 spray by Each Nostril route once daily.      insulin degludec (TRESIBA FLEXTOUCH U-200) 200 unit/mL (3 mL) insulin pen Inject 22 Units into the skin once daily. 2 pen 6    Lactobacillus rhamnosus GG (CULTURELLE) 10 billion cell capsule Take 1 capsule by mouth once daily.      metFORMIN (GLUCOPHAGE-XR) 500 MG ER 24hr tablet TAKE 2 TABLETS(1000 MG) BY MOUTH TWICE DAILY WITH MEALS 360 tablet 2    multivitamin with minerals tablet Take 1 " tablet by mouth once daily.      NOVOLOG FLEXPEN U-100 INSULIN 100 unit/mL (3 mL) InPn pen INJECT 10 UNITS INTO THE SKIN THREE TIMES DAILY WITH MEALS 6 mL 5    TURMERIC ORAL Take by mouth.      UNABLE TO FIND Cranberry & Buchu natural supplement      UNABLE TO FIND Nerve Eight natural supplement      UNABLE TO FIND Mullein natural supplement      blood-glucose sensor (DEXCOM G6 SENSOR) Brittany Change every 10 days (Patient not taking: Reported on 2020) 3 Device 11    gabapentin (NEURONTIN) 100 MG capsule Take 1 capsule (100 mg total) by mouth 3 (three) times daily. 90 capsule 11     No current facility-administered medications on file prior to visit.       Past Surgical History:   Procedure Laterality Date    CARPAL TUNNEL RELEASE       SECTION      2    left thumb      right middle finger      right shoulder         Family History   Problem Relation Age of Onset    Diabetes Mother     Parkinsonism Mother     Alzheimer's disease Mother     Diabetes Father     Cancer Father        Social History     Socioeconomic History    Marital status:    Tobacco Use    Smoking status: Never Smoker    Smokeless tobacco: Never Used   Substance and Sexual Activity    Alcohol use: Yes    Drug use: Never       Review of Systems   Constitutional: Negative.   HENT: Negative.    Eyes: Negative.    Cardiovascular: Negative.    Respiratory: Negative.    Endocrine: Negative.    Hematologic/Lymphatic: Negative.    Skin: Negative.    Musculoskeletal: Positive for arthritis and myalgias. Negative for falls, joint pain, joint swelling, muscle cramps, muscle weakness and stiffness.   Neurological: Negative.    Psychiatric/Behavioral: Negative.    Allergic/Immunologic: Negative.                    Objective:        General: Kalpana is well-developed, well-nourished, appears stated age, in no acute distress, alert and oriented to time, place and person.     General    Nursing note and vitals  reviewed.  Constitutional: She is oriented to person, place, and time. She appears well-developed and well-nourished.   HENT:   Head: Normocephalic and atraumatic.   Nose: Nose normal.   Eyes: EOM are normal.   Cardiovascular: Normal rate and intact distal pulses.    Pulmonary/Chest: Effort normal. No respiratory distress.   Neurological: She is alert and oriented to person, place, and time.   Psychiatric: She has a normal mood and affect. Her behavior is normal. Judgment and thought content normal.     General Musculoskeletal Exam   Gait: normal         Left Knee Exam     Inspection   Alignment:  normal  Erythema: absent  Scars: absent  Swelling: absent  Effusion: absent  Deformity: absent  Bruising: absent    Range of Motion   Extension: -5   Flexion: 140     Tests   Meniscus   Sai:  Medial - negative Lateral - negative  Stability Lachman: normal (-1 to 2mm) PCL-Posterior Drawer: normal (0 to 2mm)  MCL - Valgus: normal (0 to 2mm)  LCL - Varus: normal (0 to 2mm)  Patella   Passive Patellar Tilt: neutral  Patellar Tracking: normal    Comments:  Tender over the entirety of the ITB band.  Tender over Gerdy's tubercle.  Pain with resisted abduction.Left Hip Exam     Inspection   Scars: absent  Swelling: absent  No deformity of hip.  Quadriceps Atrophy:  negative  Erythema: absent  Bruising: absent    Tenderness   The patient tender to palpation of the trochanteric bursa.    Range of Motion   Extension: 10   Flexion: 110   External rotation: 60   Internal rotation: 20     Tests   Pain w/ forced internal rotation (DIAN): absent  Pain w/ forced external rotation (FADIR): absent  Tri: positive  Circumduction test: negative          Muscle Strength   Left Lower Extremity   Hip Abduction: 4/5   Hip Adduction: 5/5   Hip Flexion: 5/5   Quadriceps:  5/5   Hamstrin/5               Assessment:     Kalpana Rivera is a 61 y.o. female with IT band syndrome of the left.  Encounter Diagnosis   Name Primary?    It band  syndrome, left Yes          Plan:         Diagnosis and treatment options discussed at length with the patient all her questions were answered.  I explained this is something that typically resolves with physical therapy and aggressive stretching.  She understood this.  We will refer back to physical therapy.  She return to clinic in 6 weeks for re-evaluation.  Physical therapy will work on IT band stretching with soft tissue modalities.    All of their questions were answered.  They will call the clinic with any questions or concerns in the interim.    Should the patient's symptoms worsen, persist, or fail to improve they should return for reevaluation and I would be happy to see them back anytime.        Aleks Hawk M.D.     Please be aware that this note has been generated with the assistance of [email or web address] voice-to-text.  Please excuse any spelling or grammatical errors.    Thank you for choosing Dr. Aleks Hawk for your sports medicine care. It is our goal to provide you with exceptional care that will help keep you healthy, active, and get you back in the game.     If you felt that you received exemplary care today, please consider leaving feedback for Dr. Hawk on [employer] at [email or web address].    Please do not hesitate to reach out to us via email, phone, or MyChart with any questions, concerns, or feedback.

## 2022-01-27 DIAGNOSIS — E10.9 TYPE 1 DIABETES MELLITUS WITHOUT COMPLICATION: ICD-10-CM

## 2022-01-27 RX ORDER — GABAPENTIN 100 MG/1
CAPSULE ORAL
Qty: 90 CAPSULE | Refills: 11 | Status: SHIPPED | OUTPATIENT
Start: 2022-01-27 | End: 2022-05-23

## 2022-01-28 RX ORDER — BLOOD-GLUCOSE SENSOR
1 EACH MISCELLANEOUS
Qty: 3 EACH | Refills: 11 | Status: SHIPPED | OUTPATIENT
Start: 2022-01-28 | End: 2022-12-20 | Stop reason: SDUPTHER

## 2022-02-16 ENCOUNTER — NUTRITION (OUTPATIENT)
Dept: DIABETES | Facility: CLINIC | Age: 62
End: 2022-02-16
Payer: COMMERCIAL

## 2022-02-16 DIAGNOSIS — E10.9 TYPE 1 DIABETES MELLITUS WITHOUT COMPLICATION: ICD-10-CM

## 2022-02-16 PROCEDURE — G0108 PR DIAB MANAGE TRN  PER INDIV: ICD-10-PCS | Mod: S$GLB,,, | Performed by: DIETITIAN, REGISTERED

## 2022-02-16 PROCEDURE — 99999 PR PBB SHADOW E&M-EST. PATIENT-LVL I: CPT | Mod: PBBFAC,,, | Performed by: DIETITIAN, REGISTERED

## 2022-02-16 PROCEDURE — 99999 PR PBB SHADOW E&M-EST. PATIENT-LVL I: ICD-10-PCS | Mod: PBBFAC,,, | Performed by: DIETITIAN, REGISTERED

## 2022-02-16 PROCEDURE — G0108 DIAB MANAGE TRN  PER INDIV: HCPCS | Mod: S$GLB,,, | Performed by: DIETITIAN, REGISTERED

## 2022-02-16 NOTE — PROGRESS NOTES
Diabetes Care Specialist Progress Note  Author: Danuta Padilla RD  Date: 2/16/2022    Program Intake  Reason for Diabetes Program Visit:: Intervention  Type of Intervention:: Individual  Individual: Device Training  Device Training: Other  Current diabetes risk level:: low  In the last 12 months, have you:: none  Permission to speak with others about care:: no    Lab Results   Component Value Date    HGBA1C 6.9 (H) 12/03/2021         Diabetes Self-Management Skills Assessment  Medications  Patient is able to describe current diabetes management routine.: yes  Diabetes management routine:: insulin  Patient is able to identify current diabetes medications, dosages, and appropriate timing of medications.: yes  Patient understands the purpose of the medications taken for diabetes.: yes  Patient reports problems or concerns with current medication regimen.: no  Medication Skills Assessment Completed:: Yes  Assessment indicates:: Instruction Needed  Area of need?: Yes      Assessment Summary and Plan    Based on today's diabetes care assessment, the following areas of need were identified:          Diabetes Self-Management Skills 2/16/2022   Medication Yes - see care plan          Today's interventions were provided through individual discussion, instruction, and written materials were provided.      Patient verbalized understanding of instruction and written materials.  Pt was able to return back demonstration of instructions today. Patient understood key points, needs reinforcement and further instruction.     Diabetes Self-Management Care Plan:    Today's Diabetes Self-Management Care Plan was developed with Kalpana's input. Kalpana has agreed to work toward the following goal(s) to improve his/her overall diabetes control.      Care Plan: Diabetes Management   Updates made since 1/17/2022 12:00 AM      Problem: Medications       Goal: Patient Agrees to take Diabetes Medication(s) as prescribed.    Note:    Diabetes  Education  INPEN Training    Patient referred for INPEN training  Patient arrived with their smart phone charged. Patient is using IOS. Smart phone- INPEN SAVANNAH downloaded.    Therapy settings obtained from Agata Sharma NP note/order.  Therapy Settings:  Fixed Dose     Fixed Mode:  Maximum Calculated Dose:    Duration of insulin Action: 4 Hours  Target Blood Glucose:  mg/dL  Insulin Sensitivity Factor:      Fixed Dose Setting:      Breakfast: 4 Units   Lunch:  5 Units   Dinner:  6-7 Units  Active Insulin:   ON    Long-Acting Insulin Reminder:  Insulin Type:  Tresiba  Doses per day: Once Daily   Usual Amount: 22 Units  Time:   PM       · Reviewed how to install an insulin cartridge within the INPEN (Remove cartridge sanon, rewind the screw on the INPEN, inserting cartridge, attaching pen needle)   · Prime INPEN with every cartridge change using 1-2 units, optional to prime for additional doses with use of same cartridge.   · Reviewed how to dial the insulin dose and how to inject the insulin by pushing down on INPEN button  · Reviewed how to change dose from prime to insulin dose given or change insulin dose to prime dose   · Reviewed importance of rotating sites of injection.   · At 28 days- INPEN will remind to change cartridge  · Reviewed additional setting options with patient; including how to calculate a dose, how to put in current blood glucose, how to manually add an insulin dose, how to send an Insight Report- MAKE SURE TO REFRESH (Put on 14 days report).  · Reviewed Active Insulin (Insulin on Board) with patient (Within Therapy Settings). Patient is aware of what active insulin means and would like to see the active insulin on board.   · Can order another INPEN from INPEN SAVANNAH. Can have multiple INPENs set up to one SAVANNAH, IE: INPEN for work/ INPEN for home, INPEN for school/ IN PEN for home, INPEN for back-up/INPEN for home.  · Integrating CGM-Dexcom sensor for use of [employer] (APPLE DEVICE)  phone only  (Settings:Connection Services:Add service:Dexcom CLARITY-put DEXCOM login info- Inside DEXCOM sujit CONNECT to INPEN from drop down box)  · Connect CGM or BGM (Open [employer] Sujit [Profile-Apps-INPEN] Turn all categories ON. Go to INPEN sujit (Settings for Dexcom-Use apple health- prompt to open- Then turn all categories ON; Settings for BGM- Use leemail house, sync to health- prompt to open and turn all ON)  · INPEN  Support (Settings:Help and support tab:Can call right form SUJIT)       Time spent with patient: 30 minutes     Task: Reviewed with patient all current diabetes medications and provided basic review of the purpose, dosage, frequency, side effects, and storage of both oral and injectable diabetes medications. Completed 2/16/2022      Task: Reviewed possible resources for acquiring cost prohibitive medication.       Task: Instructed patient on how to self-administer Novolog via InPen Completed 2/16/2022      Task: Discussed guidelines for preventing, detecting and treating hypoglycemia and hyperglycemia and reviewed the importance of meal and medication timing with diabetes mediations for prevention of hypoglycemia and maximum drug benefit.           Follow Up Plan     Follow up in about 4 weeks (around 3/16/2022).    Today's care plan and follow up schedule was discussed with patient.  Kalpana verbalized understanding of the care plan, goals, and agrees to follow up plan.        The patient was encouraged to communicate with his/her health care provider/physician and care team regarding his/her condition(s) and treatment.  I provided the patient with my contact information today and encouraged to contact me via phone or [facility] Patient Portal as needed.     Length of Visit   Total Time: 35 Minutes

## 2022-02-17 ENCOUNTER — PATIENT MESSAGE (OUTPATIENT)
Dept: ENDOCRINOLOGY | Facility: CLINIC | Age: 62
End: 2022-02-17
Payer: COMMERCIAL

## 2022-02-17 DIAGNOSIS — E10.9 TYPE 1 DIABETES MELLITUS WITHOUT COMPLICATION: Primary | ICD-10-CM

## 2022-02-17 RX ORDER — PEN NEEDLE, DIABETIC 31 GX5/16"
NEEDLE, DISPOSABLE MISCELLANEOUS
Qty: 200 EACH | Refills: 3 | Status: SHIPPED | OUTPATIENT
Start: 2022-02-17 | End: 2022-08-26 | Stop reason: SDUPTHER

## 2022-02-17 RX ORDER — INSULIN ASPART 100 [IU]/ML
INJECTION, SOLUTION INTRAVENOUS; SUBCUTANEOUS
Qty: 30 ML | Refills: 3 | Status: SHIPPED | OUTPATIENT
Start: 2022-02-17 | End: 2022-08-26

## 2022-03-07 ENCOUNTER — OFFICE VISIT (OUTPATIENT)
Dept: SPORTS MEDICINE | Facility: CLINIC | Age: 62
End: 2022-03-07
Payer: COMMERCIAL

## 2022-03-07 VITALS
DIASTOLIC BLOOD PRESSURE: 82 MMHG | HEART RATE: 102 BPM | BODY MASS INDEX: 33.82 KG/M2 | SYSTOLIC BLOOD PRESSURE: 136 MMHG | WEIGHT: 167.75 LBS | HEIGHT: 59 IN

## 2022-03-07 DIAGNOSIS — M76.32 IT BAND SYNDROME, LEFT: Primary | ICD-10-CM

## 2022-03-07 PROCEDURE — 3075F SYST BP GE 130 - 139MM HG: CPT | Mod: CPTII,S$GLB,, | Performed by: STUDENT IN AN ORGANIZED HEALTH CARE EDUCATION/TRAINING PROGRAM

## 2022-03-07 PROCEDURE — 3008F BODY MASS INDEX DOCD: CPT | Mod: CPTII,S$GLB,, | Performed by: STUDENT IN AN ORGANIZED HEALTH CARE EDUCATION/TRAINING PROGRAM

## 2022-03-07 PROCEDURE — 1160F RVW MEDS BY RX/DR IN RCRD: CPT | Mod: CPTII,S$GLB,, | Performed by: STUDENT IN AN ORGANIZED HEALTH CARE EDUCATION/TRAINING PROGRAM

## 2022-03-07 PROCEDURE — 99999 PR PBB SHADOW E&M-EST. PATIENT-LVL IV: CPT | Mod: PBBFAC,,, | Performed by: STUDENT IN AN ORGANIZED HEALTH CARE EDUCATION/TRAINING PROGRAM

## 2022-03-07 PROCEDURE — 99999 PR PBB SHADOW E&M-EST. PATIENT-LVL IV: ICD-10-PCS | Mod: PBBFAC,,, | Performed by: STUDENT IN AN ORGANIZED HEALTH CARE EDUCATION/TRAINING PROGRAM

## 2022-03-07 PROCEDURE — 1159F MED LIST DOCD IN RCRD: CPT | Mod: CPTII,S$GLB,, | Performed by: STUDENT IN AN ORGANIZED HEALTH CARE EDUCATION/TRAINING PROGRAM

## 2022-03-07 PROCEDURE — 3008F PR BODY MASS INDEX (BMI) DOCUMENTED: ICD-10-PCS | Mod: CPTII,S$GLB,, | Performed by: STUDENT IN AN ORGANIZED HEALTH CARE EDUCATION/TRAINING PROGRAM

## 2022-03-07 PROCEDURE — 1159F PR MEDICATION LIST DOCUMENTED IN MEDICAL RECORD: ICD-10-PCS | Mod: CPTII,S$GLB,, | Performed by: STUDENT IN AN ORGANIZED HEALTH CARE EDUCATION/TRAINING PROGRAM

## 2022-03-07 PROCEDURE — 3079F DIAST BP 80-89 MM HG: CPT | Mod: CPTII,S$GLB,, | Performed by: STUDENT IN AN ORGANIZED HEALTH CARE EDUCATION/TRAINING PROGRAM

## 2022-03-07 PROCEDURE — 3075F PR MOST RECENT SYSTOLIC BLOOD PRESS GE 130-139MM HG: ICD-10-PCS | Mod: CPTII,S$GLB,, | Performed by: STUDENT IN AN ORGANIZED HEALTH CARE EDUCATION/TRAINING PROGRAM

## 2022-03-07 PROCEDURE — 3079F PR MOST RECENT DIASTOLIC BLOOD PRESSURE 80-89 MM HG: ICD-10-PCS | Mod: CPTII,S$GLB,, | Performed by: STUDENT IN AN ORGANIZED HEALTH CARE EDUCATION/TRAINING PROGRAM

## 2022-03-07 PROCEDURE — 1160F PR REVIEW ALL MEDS BY PRESCRIBER/CLIN PHARMACIST DOCUMENTED: ICD-10-PCS | Mod: CPTII,S$GLB,, | Performed by: STUDENT IN AN ORGANIZED HEALTH CARE EDUCATION/TRAINING PROGRAM

## 2022-03-07 PROCEDURE — 99213 OFFICE O/P EST LOW 20 MIN: CPT | Mod: S$GLB,,, | Performed by: STUDENT IN AN ORGANIZED HEALTH CARE EDUCATION/TRAINING PROGRAM

## 2022-03-07 PROCEDURE — 99213 PR OFFICE/OUTPT VISIT, EST, LEVL III, 20-29 MIN: ICD-10-PCS | Mod: S$GLB,,, | Performed by: STUDENT IN AN ORGANIZED HEALTH CARE EDUCATION/TRAINING PROGRAM

## 2022-03-07 NOTE — PROGRESS NOTES
"Subjective:          Chief Complaint: Kalpana Rivera is a 61 y.o. female who had concerns including Follow-up of the Left Knee.    Kalpana Rivera is a 61 y.o. female presents for follow-up for her left knee.  She has been in physical therapy for the past 6 weeks for distal ITB band syndrome and IT band tightness.  She states she started to feel some relief after about 3 weeks of physical therapy.  However she then had COVID and missed 2 weeks of physical therapy and just restarted about a week ago.  She says it now feels like it did when she was 1st evaluated for this.    Follow-up  Associated symptoms include myalgias. Pertinent negatives include no joint swelling.     Past Medical History:   Diagnosis Date    Diabetes mellitus, type 2        Current Outpatient Medications on File Prior to Visit   Medication Sig Dispense Refill    b complex vitamins tablet Take 1 tablet by mouth once daily.      BD ULTRA-FINE SHORT PEN NEEDLE 31 gauge x 5/16" Ndle 4 daily 200 each 3    blood-glucose sensor (DEXCOM G6 SENSOR) Brittany 1 Device by Misc.(Non-Drug; Combo Route) route every 10 days. 3 each 11    blood-glucose transmitter (DEXCOM G6 TRANSMITTER) Brittany 2 Devices by Misc.(Non-Drug; Combo Route) route every 6 (six) months. 2 Device 1    cetirizine (ZYRTEC) 10 MG tablet Take 10 mg by mouth once daily.      dulaglutide (TRULICITY) 0.75 mg/0.5 mL pen injector Inject 0.75 mg into the skin every 7 days. 4 pen 5    fluticasone propionate (FLONASE) 50 mcg/actuation nasal spray 1 spray by Each Nostril route once daily.      gabapentin (NEURONTIN) 100 MG capsule TAKE 1 CAPSULE(100 MG) BY MOUTH THREE TIMES DAILY 90 capsule 11    insulin aspart U-100 (NOVOLOG PENFILL U-100 INSULIN) 100 unit/mL Crtg To use with INPEN; up to 90 units daily 30 mL 3    insulin degludec (TRESIBA FLEXTOUCH U-200) 200 unit/mL (3 mL) insulin pen Inject 22 Units into the skin once daily. 2 pen 6    Lactobacillus rhamnosus GG (CULTURELLE) 10 " billion cell capsule Take 1 capsule by mouth once daily.      metFORMIN (GLUCOPHAGE-XR) 500 MG ER 24hr tablet TAKE 2 TABLETS(1000 MG) BY MOUTH TWICE DAILY WITH MEALS 360 tablet 2    multivitamin with minerals tablet Take 1 tablet by mouth once daily.      TURMERIC ORAL Take by mouth.      UNABLE TO FIND Cranberry & Buchu natural supplement      UNABLE TO FIND Nerve Eight natural supplement      UNABLE TO FIND Mullein natural supplement       No current facility-administered medications on file prior to visit.       Past Surgical History:   Procedure Laterality Date    CARPAL TUNNEL RELEASE       SECTION      2    left thumb      right middle finger      right shoulder         Family History   Problem Relation Age of Onset    Diabetes Mother     Parkinsonism Mother     Alzheimer's disease Mother     Diabetes Father     Cancer Father        Social History     Socioeconomic History    Marital status:    Tobacco Use    Smoking status: Never Smoker    Smokeless tobacco: Never Used   Substance and Sexual Activity    Alcohol use: Yes    Drug use: Never       Review of Systems   Constitutional: Negative.   HENT: Negative.    Eyes: Negative.    Cardiovascular: Negative.    Respiratory: Negative.    Endocrine: Negative.    Hematologic/Lymphatic: Negative.    Skin: Negative.    Musculoskeletal: Positive for arthritis and myalgias. Negative for falls, joint pain, joint swelling, muscle cramps, muscle weakness and stiffness.   Neurological: Negative.    Psychiatric/Behavioral: Negative.    Allergic/Immunologic: Negative.                    Objective:        General: Kalpana is well-developed, well-nourished, appears stated age, in no acute distress, alert and oriented to time, place and person.     General    Nursing note and vitals reviewed.  Constitutional: She is oriented to person, place, and time. She appears well-developed and well-nourished.   HENT:   Head: Normocephalic and atraumatic.    Nose: Nose normal.   Eyes: EOM are normal.   Cardiovascular: Normal rate and intact distal pulses.    Pulmonary/Chest: Effort normal. No respiratory distress.   Neurological: She is alert and oriented to person, place, and time.   Psychiatric: She has a normal mood and affect. Her behavior is normal. Judgment and thought content normal.     General Musculoskeletal Exam   Gait: normal         Left Knee Exam     Inspection   Alignment:  normal  Erythema: absent  Scars: absent  Swelling: absent  Effusion: absent  Deformity: absent  Bruising: absent    Range of Motion   Extension: -5   Flexion: 140     Tests   Meniscus   Sai:  Medial - negative Lateral - negative  Stability Lachman: normal (-1 to 2mm) PCL-Posterior Drawer: normal (0 to 2mm)  MCL - Valgus: normal (0 to 2mm)  LCL - Varus: normal (0 to 2mm)  Patella   Passive Patellar Tilt: neutral  Patellar Tracking: normal    Comments:  Tender over the entirety of the ITB band.  Tender over Gerdy's tubercle.  Pain with resisted abduction.  The entirety of the IT band is very tight.Left Hip Exam     Inspection   Scars: absent  Swelling: absent  No deformity of hip.  Quadriceps Atrophy:  negative  Erythema: absent  Bruising: absent    Tenderness   The patient tender to palpation of the trochanteric bursa.    Range of Motion   Extension: 10   Flexion: 110   External rotation: 60   Internal rotation: 20     Tests   Pain w/ forced internal rotation (DIAN): absent  Pain w/ forced external rotation (FADIR): absent  Tri: positive  Circumduction test: negative          Muscle Strength   Left Lower Extremity   Hip Abduction: 4/5   Hip Adduction: 5/5   Hip Flexion: 5/5   Quadriceps:  5/5   Hamstrin/5               Assessment:     Kalpana Rivera is a 61 y.o. female with IT band syndrome of the left.  Encounter Diagnosis   Name Primary?    It band syndrome, left Yes          Plan:       She had good relief of her symptoms with physical therapy prior to having the  take a 2 week break due to COVID.  I think we should restart physical therapy with aggressive stretching the IT band.  I discussed this with the patient sees in agreement.  We will re-evaluate her in 5 weeks.    All of their questions were answered.  They will call the clinic with any questions or concerns in the interim.    Should the patient's symptoms worsen, persist, or fail to improve they should return for reevaluation and I would be happy to see them back anytime.        Aleks Hawk M.D.     Please be aware that this note has been generated with the assistance of [employer] voice-to-text.  Please excuse any spelling or grammatical errors.    Thank you for choosing Dr. Aleks Hawk for your sports medicine care. It is our goal to provide you with exceptional care that will help keep you healthy, active, and get you back in the game.     If you felt that you received exemplary care today, please consider leaving feedback for Dr. Hawk on [employer] at [email or web address].    Please do not hesitate to reach out to us via email, phone, or MyChart with any questions, concerns, or feedback.

## 2022-03-28 ENCOUNTER — PATIENT MESSAGE (OUTPATIENT)
Dept: ENDOCRINOLOGY | Facility: CLINIC | Age: 62
End: 2022-03-28
Payer: COMMERCIAL

## 2022-04-01 NOTE — PROGRESS NOTES
Subjective:      Patient ID: Kalpana Rivera is a 61 y.o. female.    Chief Complaint:  No chief complaint on file.    History of Present Illness  Kalpana Rivera presents today for follow up of JULIANN now managed as type 1 diabetic-insulin dependent. Previous patient of Dr. Zeng and myself. Last seen in Dec 2021.      Ref. Range 9/14/2020 07:45   C-Peptide Latest Ref Range: 0.80 - 3.85 ng/mL 0.10 (L)      Ref. Range 9/14/2020 07:45   Glucose Latest Ref Range: 65 - 99 mg/dL 154 (H)     Glutamate decarboxylase 65 Ab Latest Ref Range: <5 IU/mL 97 (H)     This is a MyChart video visit.    The patient location is: at home   The chief complaint leading to consultation is: diabetes   Visit type: Virtual visit with synchronous audio and video  Total time spent with patient: 22 minutes  Each patient to whom he or she provides medical services by telemedicine is:  (1) informed of the relationship between the physician and patient and the respective role of any other health care provider with respect to management of the patient; and (2) notified that he or she may decline to receive medical services by telemedicine and may withdraw from such care at any time.    Since her last visit, she had COVID in Feb 2022.    With regards to the diabetes:    Diagnosed with diabetes age 24; diagnosed as JULIANN in 2020  Started insulin during first pregnancy and able to come off. In 1992 had son and has been on insulin since that time  Nephew is type 1  Family History of Type 2 DM: mother and father  Known complications:  DKA/HHS: twice in the past  RN: denies; up to date with eye exam --sees outside   PN: now on gabapentin- unsure if helping-- on and off pain in thighs -thinks due to sciatica; saw podiatry in Aug 2021  Nephropathy: -; due   Gastroparesis: denies  CAD: denies    Current regimen:    Since her last visit, she went to education for INPEN but she did not start -did not have cartridges.   For now, she is using novolog pens.  She has cartridges and will try to start. Will let me know if she needs education again.    Fixed Mode:  Maximum Calculated Dose:          Duration of insulin Action:         4 Hours  Target Blood Glucose:             mg/dL  Insulin Sensitivity Factor:           Fixed Dose Setting:                                       Breakfast:            4 Units              Lunch:                  5 Units              Dinner:                 6-7 Units  Active Insulin:                   ON     Long-Acting Insulin Reminder:  Insulin Type:              Tresiba  Doses per day:          Once Daily   Usual Amount:          22 Units  Time:                           AM                Metformin 1000 mg twice daily   Trulicity 0.75 mg weekly- still with nausea with Trulicity-states she is feeling the satiety effects that comes and goes-- does not want to increased dose    Noticed better BGs since Trulicity    States she changes her novolog based on what she is eating at times; sometimes reduces novolog to 4-5 units     Has been taking novolog 10-15 minutes prior to a meal most days. Takes right before a meal if her BG is <110.  States she is afraid to give insulin at times before a meal because she is afraid she will drop- takes senior care through meal if her BG <100 (states this occurs 3 times a month). She is changing needle every time and rotating injection sites.     Missed doses-denies     Other medications tried:  Trulicity 1.5 mg weekly- very low appetite so stopped  levemir    4 times daily  See media tab.   She loves the dexcom.   Average    GMI 7.1%  71% in target; 22% high; 6% very high    Hypoglycemic event 1%  Yes, did not need assistance   Knows how to correct with 15 grams of carbs- juice, coke, or a peppermint.     Dietary recall: She feels that she is following diabetic diet  Eats 3 meals a day. States that she has noticed portion sizes decreased because she is not as hungry.  B: greek yogurt with splenda or protein  shake -premier (7-7:30)  L: healthy choice or grilled cheese sandwich (12-1)  D: same as lunch (6:30-7)   She does not cook often but has some frozen foods.   She has been working from home.   Snacks: not really snacking as much anymore; pickles and cashews; carrots; not normally snacking after dinner  Drinks: water, green tea, and diet luli  Had box of raisins before     Exercise -She is still doing PT exercises she learned. She knows that she needs to use her treadmill. Has not restarted yet but plans to restart.  She wants to start working with a . She went back to her office and does not have much time.    Education - last visit: 2/2022     Has a Medic alert tag-has bracelet   Glucagon/Baqsimi-declines; lives alone    Diabetes Management Status  Statin: Not taking  ACE/ARB: Not taking    Lab Results   Component Value Date    HGBA1C 6.9 (H) 03/28/2022    HGBA1C 6.9 (H) 12/03/2021    HGBA1C 6.8 (H) 08/04/2021     Screening or Prevention Patient's value Goal Complete/Controlled?   HgA1C Testing and Control   Lab Results   Component Value Date    HGBA1C 6.9 (H) 03/28/2022      Annually/Less than 8% Yes   Lipid profile : 03/28/2022 Annually Yes   LDL control Lab Results   Component Value Date    LDLCALC 65.4 03/28/2022    Annually/Less than 100 mg/dl  Yes   Nephropathy screening No results found for: LABMICR  No results found for: PROTEINUA Annually No   Blood pressure BP Readings from Last 1 Encounters:   03/07/22 136/82    Less than 140/90 Yes   Dilated retinal exam : 01/01/2020 Annually Yes   Foot exam   : 08/16/2021 Annually Yes     With regards to thyroid nodule,     Remembers being told in the past that she had a thyroid nodule and needed biopsy which she did not do.     Denies hoarseness or dysphagia.     No radiation to head or neck. No history of thyroid cancer in family.     Thyroid US 11.3.2020  RIGHT LOBE:   Right lobe measures 5.53 x 1.59 x 1.85 cm.   Mid lobe nodule measures 0.66 x 0.63 x 0.69  cm.Hypoechoic.   LEFT LOBE:   Left lobe measures 4.78 x 1.47 x 1.49 cm.   ISTHMUS:  Isthmus measures 0.21 cm.   LYMPH NODES:  Observed lymph nodes appear benign.   COMPARISON:  None   Impression:   Enlarged right lobe with a well-defined subcentimeter hypoechoic nodule in the midportion of the right lobe. Small cysts bilaterally.   RECOMMENDATIONS:  Repeat thyroid ultrasound in 2 years.    With regards to dyslipidemia,     Not on medications for cholesterol     Latest Reference Range & Units 09/14/20 07:45 03/26/21 08:07 03/28/22 07:45   Cholesterol 120 - 199 mg/dL 122 140 134 [1]   HDL 40 - 75 mg/dL 63 65 61 [2]   HDL/Cholesterol Ratio 20.0 - 50.0 % 1.9 2.2 45.5   LDL Cholesterol External 63.0 - 159.0 mg/dL 46 [3] 63 [4] 65.4 [5]   Non HDL Chol. (LDL+VLDL) <130 mg/dL (calc) 59 [6] 75 [7]    Non-HDL Cholesterol mg/dL   73 [8]   Total Cholesterol/HDL Ratio 2.0 - 5.0    2.2   Triglycerides 30 - 150 mg/dL 44 50 38 [9]     States she did labs at work in Oct 2021 and was below  TG 43  LDL 62  HDL: 47    Of note, she had elevated CO2 --declines sleep study at this time.    Review of Systems   Constitutional: Negative for fatigue and unexpected weight change.   Eyes: Negative for visual disturbance.   Endocrine: Negative for polydipsia, polyphagia and polyuria.     Objective:   Physical Exam  Constitutional:       Appearance: Normal appearance.   Neurological:      Mental Status: She is alert.       There were no vitals taken for this visit.     Lab Review:   Lab Results   Component Value Date    HGBA1C 6.9 (H) 03/28/2022    HGBA1C 6.9 (H) 12/03/2021    HGBA1C 6.8 (H) 08/04/2021      Lab Results   Component Value Date    CHOL 134 03/28/2022    HDL 61 03/28/2022    LDLCALC 65.4 03/28/2022    TRIG 38 03/28/2022    CHOLHDL 45.5 03/28/2022     Lab Results   Component Value Date     03/28/2022    K 4.2 03/28/2022     03/28/2022    CO2 27 03/28/2022     (H) 03/28/2022    BUN 6 (L) 03/28/2022    CREATININE 0.52  03/28/2022    CALCIUM 8.9 03/28/2022    PROT 6.8 03/28/2022    ALBUMIN 4.0 03/28/2022    BILITOT 0.3 03/28/2022    ALKPHOS 66 03/28/2022    AST 24 03/28/2022    ALT 22 03/28/2022    ANIONGAP 9 03/28/2022    ESTGFRAFRICA >60.0 03/28/2022    EGFRNONAA >60.0 03/28/2022    TSH 1.80 09/14/2020     No results found for: [id number]  Assessment and Plan     1. Type 1 diabetes mellitus without complication  Hemoglobin A1C    Microalbumin/Creatinine Ratio, Urine    Comprehensive Metabolic Panel   2. Thyroid nodule     3. Class 2 severe obesity with serious comorbidity and body mass index (BMI) of 35.0 to 35.9 in adult, unspecified obesity type         Type 1 diabetes mellitus  -- Reviewed goals of therapy are to get the best control we can without hypoglycemia  -- Treat as type 1 diabetic. Continue Trulicity due to satiety effects which may help with weight loss.  A1c at goal  Check labs on RTC  Check UMCR next time   She will let me know if she needs reeducation for inpen.    Medication changes:     Dexcom reviewed: some overnight hyperglycemia around 12-3AM likely related to snacking without insulin.Some lower end of normal blood sugar after breakfast.                   Continue   Tresiba 22 units daily (we have decreased in the past and she had persistent hyperglycemia)  For now, continue below.              Continue   Metformin 1000 mg twice daily  Trulicity 0.75 mg weekly    Change novolog to                                    Breakfast:            3 Units              Lunch:                  5 Units              Dinner:                 5 Units    Add 1.5 units with bedtime snack if you have it when you start inpen. For now, just give novolog 1-2 units for high carb bedtime snack.    Will likely change to half units of all novolog insulin above when she starts inpen.        -- Please try to give insulin at least 10 minutes before a meal.   -- Reviewed patient's current insulin regimen. Clarified proper insulin dose and  timing in relation to meals, etc. Insulin injection sites and proper rotation instructed.    -- Advised frequent self blood glucose monitoring.  Patient encouraged to document glucose results and bring them to every clinic visit    -- Hypoglycemia precautions discussed. Instructed on precautions before driving.    -- Call for Bg repeatedly < 90 or > 180.   -- Close adherence to lifestyle changes recommended.   -- Periodic follow ups for eye evaluations, foot care and dental care suggested.  -- Given information on diabetic snacks and hypoglycemia previously  Cholesterol at goal without medications    Thyroid nodule  -- Small stable nodule on US in 2020  -- Denies compressive symptoms  -- Repeat thyroid US in 2 years- November 2022    Class 2 severe obesity with serious comorbidity in adult  Continue Trulicity as above  States she is losing weight since increasing exercise     Follow up in about 4 months (around 8/4/2022).  Labs prior to visit     I spent 22 minutes face-to-face with the patient, over half of the visit was spent on counseling and/or coordinating the care of the patient.    Counseling includes:  Diagnostic results, impressions, recommendations   Prognosis   Risk and benefits of management/treatment options   Instructions for management treatment and or follow-up   Importance of compliance with management   Risk factor reduction   Patient education

## 2022-04-01 NOTE — ASSESSMENT & PLAN NOTE
-- Reviewed goals of therapy are to get the best control we can without hypoglycemia  -- Treat as type 1 diabetic. Continue Trulicity due to satiety effects which may help with weight loss.  A1c at goal  Check labs on RTC  Check UMCR next time   She will let me know if she needs reeducation for inpen.    Medication changes:     Dexcom reviewed: some overnight hyperglycemia around 12-3AM likely related to snacking without insulin.Some lower end of normal blood sugar after breakfast.                   Continue   Tresiba 22 units daily (we have decreased in the past and she had persistent hyperglycemia)  For now, continue below.              Continue   Metformin 1000 mg twice daily  Trulicity 0.75 mg weekly    Change novolog to                                    Breakfast:            3 Units              Lunch:                  5 Units              Dinner:                 5 Units    Add 1.5 units with bedtime snack if you have it when you start inpen. For now, just give novolog 1-2 units for high carb bedtime snack.    Will likely change to half units of all novolog insulin above when she starts inpen.        -- Please try to give insulin at least 10 minutes before a meal.   -- Reviewed patient's current insulin regimen. Clarified proper insulin dose and timing in relation to meals, etc. Insulin injection sites and proper rotation instructed.    -- Advised frequent self blood glucose monitoring.  Patient encouraged to document glucose results and bring them to every clinic visit    -- Hypoglycemia precautions discussed. Instructed on precautions before driving.    -- Call for Bg repeatedly < 90 or > 180.   -- Close adherence to lifestyle changes recommended.   -- Periodic follow ups for eye evaluations, foot care and dental care suggested.  -- Given information on diabetic snacks and hypoglycemia previously  Cholesterol at goal without medications

## 2022-04-01 NOTE — ASSESSMENT & PLAN NOTE
-- Small stable nodule on US in 2020  -- Denies compressive symptoms  -- Repeat thyroid US in 2 years- November 2022

## 2022-04-04 ENCOUNTER — OFFICE VISIT (OUTPATIENT)
Dept: ENDOCRINOLOGY | Facility: CLINIC | Age: 62
End: 2022-04-04
Payer: COMMERCIAL

## 2022-04-04 DIAGNOSIS — E10.9 TYPE 1 DIABETES MELLITUS WITHOUT COMPLICATION: ICD-10-CM

## 2022-04-04 DIAGNOSIS — E04.1 THYROID NODULE: ICD-10-CM

## 2022-04-04 DIAGNOSIS — E66.01 CLASS 2 SEVERE OBESITY WITH SERIOUS COMORBIDITY AND BODY MASS INDEX (BMI) OF 35.0 TO 35.9 IN ADULT, UNSPECIFIED OBESITY TYPE: ICD-10-CM

## 2022-04-04 PROCEDURE — 1159F PR MEDICATION LIST DOCUMENTED IN MEDICAL RECORD: ICD-10-PCS | Mod: CPTII,95,, | Performed by: NURSE PRACTITIONER

## 2022-04-04 PROCEDURE — 1160F RVW MEDS BY RX/DR IN RCRD: CPT | Mod: CPTII,95,, | Performed by: NURSE PRACTITIONER

## 2022-04-04 PROCEDURE — 1160F PR REVIEW ALL MEDS BY PRESCRIBER/CLIN PHARMACIST DOCUMENTED: ICD-10-PCS | Mod: CPTII,95,, | Performed by: NURSE PRACTITIONER

## 2022-04-04 PROCEDURE — 3044F HG A1C LEVEL LT 7.0%: CPT | Mod: CPTII,95,, | Performed by: NURSE PRACTITIONER

## 2022-04-04 PROCEDURE — 99214 OFFICE O/P EST MOD 30 MIN: CPT | Mod: 95,,, | Performed by: NURSE PRACTITIONER

## 2022-04-04 PROCEDURE — 1159F MED LIST DOCD IN RCRD: CPT | Mod: CPTII,95,, | Performed by: NURSE PRACTITIONER

## 2022-04-04 PROCEDURE — 3044F PR MOST RECENT HEMOGLOBIN A1C LEVEL <7.0%: ICD-10-PCS | Mod: CPTII,95,, | Performed by: NURSE PRACTITIONER

## 2022-04-04 PROCEDURE — 99214 PR OFFICE/OUTPT VISIT, EST, LEVL IV, 30-39 MIN: ICD-10-PCS | Mod: 95,,, | Performed by: NURSE PRACTITIONER

## 2022-04-04 NOTE — PATIENT INSTRUCTIONS
Dr. Adams Nicolas -- PCP      Thyroid Nodule               Check thyroid US in Nov 2022     Cholesterol               At goal without medications. Discussed length of diabetes does come with risk of heart attack. Declines need for medication at this time.     Diabetes              Dexcom reviewed: some overnight hyperglycemia around 12-3AM likely related to snacking without insulin.Some lower end of normal blood sugar after breakfast.                   Continue   Tresiba 22 units daily (we have decreased in the past and she had persistent hyperglycemia)  For now, continue below.              Continue   Metformin 1000 mg twice daily  Trulicity 0.75 mg weekly    Change novolog to                                    Breakfast:            3 Units              Lunch:                  5 Units              Dinner:                 5 Units    Add 1.5 units with bedtime snack if you have it when you start inpen. For now, just give novolog 1-2 units for high carb bedtime snack.    Will likely change to half units of all novolog insulin above when she starts inpen.

## 2022-04-11 ENCOUNTER — OFFICE VISIT (OUTPATIENT)
Dept: SPORTS MEDICINE | Facility: CLINIC | Age: 62
End: 2022-04-11
Payer: COMMERCIAL

## 2022-04-11 VITALS
HEIGHT: 59 IN | WEIGHT: 170.63 LBS | SYSTOLIC BLOOD PRESSURE: 136 MMHG | BODY MASS INDEX: 34.4 KG/M2 | HEART RATE: 96 BPM | DIASTOLIC BLOOD PRESSURE: 81 MMHG

## 2022-04-11 DIAGNOSIS — M76.32 IT BAND SYNDROME, LEFT: Primary | ICD-10-CM

## 2022-04-11 PROCEDURE — 3008F BODY MASS INDEX DOCD: CPT | Mod: CPTII,S$GLB,, | Performed by: STUDENT IN AN ORGANIZED HEALTH CARE EDUCATION/TRAINING PROGRAM

## 2022-04-11 PROCEDURE — 99999 PR PBB SHADOW E&M-EST. PATIENT-LVL IV: CPT | Mod: PBBFAC,,, | Performed by: STUDENT IN AN ORGANIZED HEALTH CARE EDUCATION/TRAINING PROGRAM

## 2022-04-11 PROCEDURE — 3079F PR MOST RECENT DIASTOLIC BLOOD PRESSURE 80-89 MM HG: ICD-10-PCS | Mod: CPTII,S$GLB,, | Performed by: STUDENT IN AN ORGANIZED HEALTH CARE EDUCATION/TRAINING PROGRAM

## 2022-04-11 PROCEDURE — 1159F PR MEDICATION LIST DOCUMENTED IN MEDICAL RECORD: ICD-10-PCS | Mod: CPTII,S$GLB,, | Performed by: STUDENT IN AN ORGANIZED HEALTH CARE EDUCATION/TRAINING PROGRAM

## 2022-04-11 PROCEDURE — 1160F RVW MEDS BY RX/DR IN RCRD: CPT | Mod: CPTII,S$GLB,, | Performed by: STUDENT IN AN ORGANIZED HEALTH CARE EDUCATION/TRAINING PROGRAM

## 2022-04-11 PROCEDURE — 3075F SYST BP GE 130 - 139MM HG: CPT | Mod: CPTII,S$GLB,, | Performed by: STUDENT IN AN ORGANIZED HEALTH CARE EDUCATION/TRAINING PROGRAM

## 2022-04-11 PROCEDURE — 3075F PR MOST RECENT SYSTOLIC BLOOD PRESS GE 130-139MM HG: ICD-10-PCS | Mod: CPTII,S$GLB,, | Performed by: STUDENT IN AN ORGANIZED HEALTH CARE EDUCATION/TRAINING PROGRAM

## 2022-04-11 PROCEDURE — 3044F PR MOST RECENT HEMOGLOBIN A1C LEVEL <7.0%: ICD-10-PCS | Mod: CPTII,S$GLB,, | Performed by: STUDENT IN AN ORGANIZED HEALTH CARE EDUCATION/TRAINING PROGRAM

## 2022-04-11 PROCEDURE — 3079F DIAST BP 80-89 MM HG: CPT | Mod: CPTII,S$GLB,, | Performed by: STUDENT IN AN ORGANIZED HEALTH CARE EDUCATION/TRAINING PROGRAM

## 2022-04-11 PROCEDURE — 3008F PR BODY MASS INDEX (BMI) DOCUMENTED: ICD-10-PCS | Mod: CPTII,S$GLB,, | Performed by: STUDENT IN AN ORGANIZED HEALTH CARE EDUCATION/TRAINING PROGRAM

## 2022-04-11 PROCEDURE — 99213 PR OFFICE/OUTPT VISIT, EST, LEVL III, 20-29 MIN: ICD-10-PCS | Mod: S$GLB,,, | Performed by: STUDENT IN AN ORGANIZED HEALTH CARE EDUCATION/TRAINING PROGRAM

## 2022-04-11 PROCEDURE — 99999 PR PBB SHADOW E&M-EST. PATIENT-LVL IV: ICD-10-PCS | Mod: PBBFAC,,, | Performed by: STUDENT IN AN ORGANIZED HEALTH CARE EDUCATION/TRAINING PROGRAM

## 2022-04-11 PROCEDURE — 3044F HG A1C LEVEL LT 7.0%: CPT | Mod: CPTII,S$GLB,, | Performed by: STUDENT IN AN ORGANIZED HEALTH CARE EDUCATION/TRAINING PROGRAM

## 2022-04-11 PROCEDURE — 1159F MED LIST DOCD IN RCRD: CPT | Mod: CPTII,S$GLB,, | Performed by: STUDENT IN AN ORGANIZED HEALTH CARE EDUCATION/TRAINING PROGRAM

## 2022-04-11 PROCEDURE — 1160F PR REVIEW ALL MEDS BY PRESCRIBER/CLIN PHARMACIST DOCUMENTED: ICD-10-PCS | Mod: CPTII,S$GLB,, | Performed by: STUDENT IN AN ORGANIZED HEALTH CARE EDUCATION/TRAINING PROGRAM

## 2022-04-11 PROCEDURE — 99213 OFFICE O/P EST LOW 20 MIN: CPT | Mod: S$GLB,,, | Performed by: STUDENT IN AN ORGANIZED HEALTH CARE EDUCATION/TRAINING PROGRAM

## 2022-04-11 NOTE — PROGRESS NOTES
"Subjective:          Chief Complaint: Kalpana Rivera is a 61 y.o. female who had concerns including Follow-up of the Left Knee.    Kalpana Rivera is a 61 y.o. female presents for follow-up for her left knee.  Since her last visit she has been in physical therapy 2x week at [facility] in [place] for the past 4 weeks for distal ITB band syndrome and IT band tightness.  She states she has felt some improvement, about 20%. She reports that physical therapy has performed dry needling with no improvement as well as given her a HEP which she performs 2x week.  The pain is located over the lateral aspect of the thigh and knee, is worse over the lateral femoral condyle.  Additionally, she has developed some paresthesias and radicular pains originating at the buttock and radiating down the lateral thigh.    Follow-up  Associated symptoms include myalgias. Pertinent negatives include no joint swelling.     Past Medical History:   Diagnosis Date    Diabetes mellitus, type 2        Current Outpatient Medications on File Prior to Visit   Medication Sig Dispense Refill    b complex vitamins tablet Take 1 tablet by mouth once daily.      BD ULTRA-FINE SHORT PEN NEEDLE 31 gauge x 5/16" Ndle 4 daily 200 each 3    blood-glucose sensor (DEXCOM G6 SENSOR) Brittany 1 Device by Misc.(Non-Drug; Combo Route) route every 10 days. 3 each 11    blood-glucose transmitter (DEXCOM G6 TRANSMITTER) Brittany 2 Devices by Misc.(Non-Drug; Combo Route) route every 6 (six) months. 2 Device 1    cetirizine (ZYRTEC) 10 MG tablet Take 10 mg by mouth once daily.      dulaglutide (TRULICITY) 0.75 mg/0.5 mL pen injector Inject 0.75 mg into the skin every 7 days. 4 pen 5    fluticasone propionate (FLONASE) 50 mcg/actuation nasal spray 1 spray by Each Nostril route once daily.      gabapentin (NEURONTIN) 100 MG capsule TAKE 1 CAPSULE(100 MG) BY MOUTH THREE TIMES DAILY 90 capsule 11    insulin aspart U-100 (NOVOLOG PENFILL U-100 INSULIN) 100 " unit/mL Crtg To use with INPEN; up to 90 units daily 30 mL 3    insulin degludec (TRESIBA FLEXTOUCH U-200) 200 unit/mL (3 mL) insulin pen Inject 22 Units into the skin once daily. 2 pen 6    Lactobacillus rhamnosus GG (CULTURELLE) 10 billion cell capsule Take 1 capsule by mouth once daily.      metFORMIN (GLUCOPHAGE-XR) 500 MG ER 24hr tablet TAKE 2 TABLETS(1000 MG) BY MOUTH TWICE DAILY WITH MEALS 360 tablet 2    multivitamin with minerals tablet Take 1 tablet by mouth once daily.      TURMERIC ORAL Take by mouth.      UNABLE TO FIND Cranberry & Buchu natural supplement      UNABLE TO FIND Nerve Eight natural supplement      UNABLE TO FIND [place] natural supplement       No current facility-administered medications on file prior to visit.       Past Surgical History:   Procedure Laterality Date    CARPAL TUNNEL RELEASE       SECTION      2    left thumb      right middle finger      right shoulder         Family History   Problem Relation Age of Onset    Diabetes Mother     Parkinsonism Mother     Alzheimer's disease Mother     Diabetes Father     Cancer Father        Social History     Socioeconomic History    Marital status:    Tobacco Use    Smoking status: Never Smoker    Smokeless tobacco: Never Used   Substance and Sexual Activity    Alcohol use: Yes    Drug use: Never       Review of Systems   Constitutional: Negative.   HENT: Negative.    Eyes: Negative.    Cardiovascular: Negative.    Respiratory: Negative.    Endocrine: Negative.    Hematologic/Lymphatic: Negative.    Skin: Negative.    Musculoskeletal: Positive for arthritis and myalgias. Negative for falls, joint pain, joint swelling, muscle cramps, muscle weakness and stiffness.   Neurological: Negative.    Psychiatric/Behavioral: Negative.    Allergic/Immunologic: Negative.                    Objective:        General: Kalpana is well-developed, well-nourished, appears stated age, in no acute distress, alert and  oriented to time, place and person.     General    Nursing note and vitals reviewed.  Constitutional: She is oriented to person, place, and time. She appears well-developed and well-nourished.   HENT:   Head: Normocephalic and atraumatic.   Nose: Nose normal.   Eyes: EOM are normal.   Cardiovascular: Intact distal pulses.    Pulmonary/Chest: Effort normal. No respiratory distress.   Neurological: She is alert and oriented to person, place, and time.   Psychiatric: She has a normal mood and affect. Her behavior is normal. Judgment and thought content normal.     General Musculoskeletal Exam   Gait: normal         Left Knee Exam     Inspection   Alignment:  normal  Erythema: absent  Scars: absent  Swelling: absent  Effusion: absent  Deformity: absent  Bruising: absent    Tenderness   The patient tender to palpation of the iliotibial band.    Range of Motion   Extension: -5   Flexion: 140     Tests   Meniscus   Sai:  Medial - negative Lateral - negative  Stability Lachman: normal (-1 to 2mm) PCL-Posterior Drawer: normal (0 to 2mm)  MCL - Valgus: normal (0 to 2mm)  LCL - Varus: normal (0 to 2mm)  Patella   Passive Patellar Tilt: neutral  Patellar Tracking: normal    Comments:  Tender over the entirety of the ITB band.  Tender over Gerdy's tubercle.  Pain with resisted abduction.  The entirety of the IT band is very tight.    Right Hip Exam     Tenderness   The patient tender to palpation of the trochanteric bursa.  Left Hip Exam     Inspection   Scars: absent  Swelling: absent  No deformity of hip.  Quadriceps Atrophy:  negative  Erythema: absent  Bruising: absent    Tenderness   The patient tender to palpation of the trochanteric bursa.    Range of Motion   Extension: 10   Flexion: 110   External rotation: 60   Internal rotation: 20     Tests   Pain w/ forced internal rotation (DIAN): absent  Pain w/ forced external rotation (FADIR): absent  Tri: positive  Circumduction test: negative      Back (L-Spine &  T-Spine) / Neck (C-Spine) Exam     Back (L-Spine & T-Spine) Tests   Right Side Tests  Straight leg raise: + at 70 deg               Muscle Strength   Left Lower Extremity   Hip Abduction: 4/5   Hip Adduction: 5/5   Hip Flexion: 5/5   Quadriceps:  5/5   Hamstrin/5               Assessment:     Kalpana Rivera is a 61 y.o. female with IT band syndrome of the left.  Encounter Diagnosis   Name Primary?    It band syndrome, left Yes          Plan:       She has had some improvement with the physical therapy, although it is limited.  She has begun to have some symptoms that are concerning for possible sciatica.  We will send her back to physical therapy for another 4-5 weeks and they will continue to work on the IT band but will also add a stretches and exercise for her sciatica symptoms.  Return to clinic in 4-5 weeks.    All of their questions were answered.  They will call the clinic with any questions or concerns in the interim.    Should the patient's symptoms worsen, persist, or fail to improve they should return for reevaluation and I would be happy to see them back anytime.        Aleks Hawk M.D.     Please be aware that this note has been generated with the assistance of [employer] voice-to-text.  Please excuse any spelling or grammatical errors.    Thank you for choosing Dr. Aleks Hawk for your sports medicine care. It is our goal to provide you with exceptional care that will help keep you healthy, active, and get you back in the game.     If you felt that you received exemplary care today, please consider leaving feedback for Dr. Hawk on [email or web address] at [email or web address].    Please do not hesitate to reach out to us via email, phone, or MyChart with any questions, concerns, or feedback.

## 2022-05-14 ENCOUNTER — PATIENT MESSAGE (OUTPATIENT)
Dept: ENDOCRINOLOGY | Facility: CLINIC | Age: 62
End: 2022-05-14
Payer: COMMERCIAL

## 2022-05-14 DIAGNOSIS — E10.9 TYPE 1 DIABETES MELLITUS WITHOUT COMPLICATION: ICD-10-CM

## 2022-05-14 DIAGNOSIS — E13.9 LADA (LATENT AUTOIMMUNE DIABETES IN ADULTS), MANAGED AS TYPE 1: Primary | ICD-10-CM

## 2022-05-16 ENCOUNTER — TELEPHONE (OUTPATIENT)
Dept: SPORTS MEDICINE | Facility: CLINIC | Age: 62
End: 2022-05-16

## 2022-05-16 ENCOUNTER — OFFICE VISIT (OUTPATIENT)
Dept: SPORTS MEDICINE | Facility: CLINIC | Age: 62
End: 2022-05-16
Payer: COMMERCIAL

## 2022-05-16 VITALS
DIASTOLIC BLOOD PRESSURE: 76 MMHG | BODY MASS INDEX: 34.33 KG/M2 | HEART RATE: 93 BPM | WEIGHT: 170.31 LBS | HEIGHT: 59 IN | SYSTOLIC BLOOD PRESSURE: 142 MMHG

## 2022-05-16 DIAGNOSIS — M76.32 IT BAND SYNDROME, LEFT: ICD-10-CM

## 2022-05-16 DIAGNOSIS — E10.9 TYPE 1 DIABETES MELLITUS WITHOUT COMPLICATION: ICD-10-CM

## 2022-05-16 DIAGNOSIS — M54.32 SCIATICA OF LEFT SIDE: Primary | ICD-10-CM

## 2022-05-16 PROCEDURE — 3008F BODY MASS INDEX DOCD: CPT | Mod: CPTII,S$GLB,, | Performed by: STUDENT IN AN ORGANIZED HEALTH CARE EDUCATION/TRAINING PROGRAM

## 2022-05-16 PROCEDURE — 3078F PR MOST RECENT DIASTOLIC BLOOD PRESSURE < 80 MM HG: ICD-10-PCS | Mod: CPTII,S$GLB,, | Performed by: STUDENT IN AN ORGANIZED HEALTH CARE EDUCATION/TRAINING PROGRAM

## 2022-05-16 PROCEDURE — 3078F DIAST BP <80 MM HG: CPT | Mod: CPTII,S$GLB,, | Performed by: STUDENT IN AN ORGANIZED HEALTH CARE EDUCATION/TRAINING PROGRAM

## 2022-05-16 PROCEDURE — 1160F RVW MEDS BY RX/DR IN RCRD: CPT | Mod: CPTII,S$GLB,, | Performed by: STUDENT IN AN ORGANIZED HEALTH CARE EDUCATION/TRAINING PROGRAM

## 2022-05-16 PROCEDURE — 3044F PR MOST RECENT HEMOGLOBIN A1C LEVEL <7.0%: ICD-10-PCS | Mod: CPTII,S$GLB,, | Performed by: STUDENT IN AN ORGANIZED HEALTH CARE EDUCATION/TRAINING PROGRAM

## 2022-05-16 PROCEDURE — 3008F PR BODY MASS INDEX (BMI) DOCUMENTED: ICD-10-PCS | Mod: CPTII,S$GLB,, | Performed by: STUDENT IN AN ORGANIZED HEALTH CARE EDUCATION/TRAINING PROGRAM

## 2022-05-16 PROCEDURE — 3077F PR MOST RECENT SYSTOLIC BLOOD PRESSURE >= 140 MM HG: ICD-10-PCS | Mod: CPTII,S$GLB,, | Performed by: STUDENT IN AN ORGANIZED HEALTH CARE EDUCATION/TRAINING PROGRAM

## 2022-05-16 PROCEDURE — 1159F MED LIST DOCD IN RCRD: CPT | Mod: CPTII,S$GLB,, | Performed by: STUDENT IN AN ORGANIZED HEALTH CARE EDUCATION/TRAINING PROGRAM

## 2022-05-16 PROCEDURE — 99999 PR PBB SHADOW E&M-EST. PATIENT-LVL IV: ICD-10-PCS | Mod: PBBFAC,,, | Performed by: STUDENT IN AN ORGANIZED HEALTH CARE EDUCATION/TRAINING PROGRAM

## 2022-05-16 PROCEDURE — 3077F SYST BP >= 140 MM HG: CPT | Mod: CPTII,S$GLB,, | Performed by: STUDENT IN AN ORGANIZED HEALTH CARE EDUCATION/TRAINING PROGRAM

## 2022-05-16 PROCEDURE — 3044F HG A1C LEVEL LT 7.0%: CPT | Mod: CPTII,S$GLB,, | Performed by: STUDENT IN AN ORGANIZED HEALTH CARE EDUCATION/TRAINING PROGRAM

## 2022-05-16 PROCEDURE — 99213 PR OFFICE/OUTPT VISIT, EST, LEVL III, 20-29 MIN: ICD-10-PCS | Mod: S$GLB,,, | Performed by: STUDENT IN AN ORGANIZED HEALTH CARE EDUCATION/TRAINING PROGRAM

## 2022-05-16 PROCEDURE — 99999 PR PBB SHADOW E&M-EST. PATIENT-LVL IV: CPT | Mod: PBBFAC,,, | Performed by: STUDENT IN AN ORGANIZED HEALTH CARE EDUCATION/TRAINING PROGRAM

## 2022-05-16 PROCEDURE — 99213 OFFICE O/P EST LOW 20 MIN: CPT | Mod: S$GLB,,, | Performed by: STUDENT IN AN ORGANIZED HEALTH CARE EDUCATION/TRAINING PROGRAM

## 2022-05-16 PROCEDURE — 1159F PR MEDICATION LIST DOCUMENTED IN MEDICAL RECORD: ICD-10-PCS | Mod: CPTII,S$GLB,, | Performed by: STUDENT IN AN ORGANIZED HEALTH CARE EDUCATION/TRAINING PROGRAM

## 2022-05-16 PROCEDURE — 1160F PR REVIEW ALL MEDS BY PRESCRIBER/CLIN PHARMACIST DOCUMENTED: ICD-10-PCS | Mod: CPTII,S$GLB,, | Performed by: STUDENT IN AN ORGANIZED HEALTH CARE EDUCATION/TRAINING PROGRAM

## 2022-05-16 RX ORDER — DULAGLUTIDE 0.75 MG/.5ML
0.75 INJECTION, SOLUTION SUBCUTANEOUS
Qty: 4 PEN | Refills: 5 | Status: CANCELLED | OUTPATIENT
Start: 2022-05-16

## 2022-05-16 NOTE — PROGRESS NOTES
"Subjective:          Chief Complaint: Kalpana Rivera is a 61 y.o. female who had concerns including Pain and Follow-up of the Left Knee.    Kalpana Rivera is a 61 y.o. female presents for follow-up for her left knee.  Since her last visit she has been in physical therapy 2x week at [facility] in [place] for distal ITB band syndrome and IT band tightness.  She states she initially felt some improvement, about 20%, but has now her pain has returned. She rates her pain as a 8/10 at its worst.  Pain is over aspect of mid she really more of a numbness and burning sensation with some shooting pains.  She has noted some mild lumbar back pain.  In physical therapy they have been performing stretching, strengthening, and soft tissue modalities.  Denies any radicular pains although in of the foot.  Denies any weakness.      Follow-up  Associated symptoms include myalgias. Pertinent negatives include no joint swelling.   Pain  Associated symptoms include myalgias. Pertinent negatives include no joint swelling.     Past Medical History:   Diagnosis Date    Diabetes mellitus, type 2        Current Outpatient Medications on File Prior to Visit   Medication Sig Dispense Refill    b complex vitamins tablet Take 1 tablet by mouth once daily.      BD ULTRA-FINE SHORT PEN NEEDLE 31 gauge x 5/16" Ndle 4 daily 200 each 3    blood-glucose sensor (DEXCOM G6 SENSOR) Brittany 1 Device by Misc.(Non-Drug; Combo Route) route every 10 days. 3 each 11    blood-glucose transmitter (DEXCOM G6 TRANSMITTER) Brittany 2 Devices by Misc.(Non-Drug; Combo Route) route every 6 (six) months. 2 Device 1    cetirizine (ZYRTEC) 10 MG tablet Take 10 mg by mouth once daily.      dulaglutide (TRULICITY) 0.75 mg/0.5 mL pen injector Inject 0.75 mg into the skin every 7 days. 4 pen 5    fluticasone propionate (FLONASE) 50 mcg/actuation nasal spray 1 spray by Each Nostril route once daily.      gabapentin (NEURONTIN) 100 MG capsule TAKE 1 CAPSULE(100 " MG) BY MOUTH THREE TIMES DAILY 90 capsule 11    insulin aspart U-100 (NOVOLOG PENFILL U-100 INSULIN) 100 unit/mL Crtg To use with INPEN; up to 90 units daily 30 mL 3    insulin degludec (TRESIBA FLEXTOUCH U-200) 200 unit/mL (3 mL) insulin pen Inject 22 Units into the skin once daily. 2 pen 6    Lactobacillus rhamnosus GG (CULTURELLE) 10 billion cell capsule Take 1 capsule by mouth once daily.      metFORMIN (GLUCOPHAGE-XR) 500 MG ER 24hr tablet TAKE 2 TABLETS(1000 MG) BY MOUTH TWICE DAILY WITH MEALS 360 tablet 2    multivitamin with minerals tablet Take 1 tablet by mouth once daily.      TURMERIC ORAL Take by mouth.      UNABLE TO FIND Cranberry & Buchu natural supplement      UNABLE TO FIND Nerve Eight natural supplement      UNABLE TO FIND Mullein natural supplement       No current facility-administered medications on file prior to visit.       Past Surgical History:   Procedure Laterality Date    CARPAL TUNNEL RELEASE       SECTION      2    left thumb      right middle finger      right shoulder         Family History   Problem Relation Age of Onset    Diabetes Mother     Parkinsonism Mother     Alzheimer's disease Mother     Diabetes Father     Cancer Father        Social History     Socioeconomic History    Marital status:    Tobacco Use    Smoking status: Never Smoker    Smokeless tobacco: Never Used   Substance and Sexual Activity    Alcohol use: Yes    Drug use: Never       Review of Systems   Constitutional: Negative.   HENT: Negative.    Eyes: Negative.    Cardiovascular: Negative.    Respiratory: Negative.    Endocrine: Negative.    Hematologic/Lymphatic: Negative.    Skin: Negative.    Musculoskeletal: Positive for arthritis and myalgias. Negative for falls, joint pain, joint swelling, muscle cramps, muscle weakness and stiffness.   Neurological: Negative.    Psychiatric/Behavioral: Negative.    Allergic/Immunologic: Negative.                    Objective:         General: Kalpana is well-developed, well-nourished, appears stated age, in no acute distress, alert and oriented to time, place and person.     General    Nursing note and vitals reviewed.  Constitutional: She is oriented to person, place, and time. She appears well-developed and well-nourished.   HENT:   Head: Normocephalic and atraumatic.   Nose: Nose normal.   Eyes: EOM are normal.   Cardiovascular: Intact distal pulses.    Pulmonary/Chest: Effort normal. No respiratory distress.   Neurological: She is alert and oriented to person, place, and time.   Psychiatric: She has a normal mood and affect. Her behavior is normal. Judgment and thought content normal.     General Musculoskeletal Exam   Gait: normal         Left Knee Exam     Inspection   Alignment:  normal  Erythema: absent  Scars: absent  Swelling: absent  Effusion: absent  Deformity: absent  Bruising: absent    Tenderness   The patient tender to palpation of the iliotibial band.    Range of Motion   Extension: -5   Flexion: 140     Tests   Meniscus   Sai:  Medial - negative Lateral - negative  Stability Lachman: normal (-1 to 2mm) PCL-Posterior Drawer: normal (0 to 2mm)  MCL - Valgus: normal (0 to 2mm)  LCL - Varus: normal (0 to 2mm)  Patella   Passive Patellar Tilt: neutral  Patellar Tracking: normal    Comments:  Tender over the entirety of the ITB band.  No longer tender over Gerdy's tubercle.  Mild pain with resisted abduction.  The entirety of the IT band is very tight and hypersensitive.    Mild positive straight leg raise    Right Hip Exam     Tenderness   The patient tender to palpation of the trochanteric bursa.  Left Hip Exam     Inspection   Scars: absent  Swelling: absent  No deformity of hip.  Quadriceps Atrophy:  negative  Erythema: absent  Bruising: absent    Tenderness   The patient tender to palpation of the trochanteric bursa.    Range of Motion   Extension: 10   Flexion: 110   External rotation: 60   Internal rotation: 20     Tests    Pain w/ forced internal rotation (DIAN): absent  Pain w/ forced external rotation (FADIR): absent  Tri: positive  Circumduction test: negative      Back (L-Spine & T-Spine) / Neck (C-Spine) Exam     Back (L-Spine & T-Spine) Tests   Right Side Tests  Straight leg raise: + at 70 deg               Muscle Strength   Left Lower Extremity   Hip Abduction: 4/5   Hip Adduction: 5/5   Hip Flexion: 5/5   Quadriceps:  5/5   Hamstrin/5               Assessment:     Kalpana Rivera is a 61 y.o. female with IT band syndrome of the left as well as symptoms concerning for sciatica.  Encounter Diagnoses   Name Primary?    Sciatica of left side Yes    It band syndrome, left           Plan:       Diagnosis and treatment options discussed at length with the patient all her questions were answered.  This point I will refer to back and spine for evaluation of her lumbar back pain and radicular issues.    All of their questions were answered.  They will call the clinic with any questions or concerns in the interim.    Should the patient's symptoms worsen, persist, or fail to improve they should return for reevaluation and I would be happy to see them back anytime.        Aleks Hawk M.D.     Please be aware that this note has been generated with the assistance of [employer] voice-to-text.  Please excuse any spelling or grammatical errors.    Thank you for choosing Dr. Aleks Hawk for your sports medicine care. It is our goal to provide you with exceptional care that will help keep you healthy, active, and get you back in the game.     If you felt that you received exemplary care today, please consider leaving feedback for Dr. Hawk on "[employer]s at [email or web address]    Please do not hesitate to reach out to us via email, phone, or MyChart with any questions, concerns, or feedback.

## 2022-05-17 RX ORDER — BLOOD-GLUCOSE TRANSMITTER
2 EACH MISCELLANEOUS
Qty: 1 EACH | Refills: 3 | Status: SHIPPED | OUTPATIENT
Start: 2022-05-17 | End: 2023-11-02

## 2022-05-17 RX ORDER — DULAGLUTIDE 0.75 MG/.5ML
0.75 INJECTION, SOLUTION SUBCUTANEOUS
Qty: 4 PEN | Refills: 5 | Status: SHIPPED | OUTPATIENT
Start: 2022-05-17 | End: 2022-10-07 | Stop reason: SDUPTHER

## 2022-05-19 ENCOUNTER — TELEPHONE (OUTPATIENT)
Dept: ORTHOPEDICS | Facility: CLINIC | Age: 62
End: 2022-05-19
Payer: COMMERCIAL

## 2022-05-19 DIAGNOSIS — M51.36 DDD (DEGENERATIVE DISC DISEASE), LUMBAR: Primary | ICD-10-CM

## 2022-05-23 ENCOUNTER — HOSPITAL ENCOUNTER (OUTPATIENT)
Dept: RADIOLOGY | Facility: HOSPITAL | Age: 62
Discharge: HOME | End: 2022-05-23
Attending: REGISTERED NURSE
Payer: COMMERCIAL

## 2022-05-23 ENCOUNTER — OFFICE VISIT (OUTPATIENT)
Dept: ORTHOPEDICS | Facility: CLINIC | Age: 62
End: 2022-05-23
Payer: COMMERCIAL

## 2022-05-23 VITALS — BODY MASS INDEX: 33.58 KG/M2 | HEIGHT: 60 IN | WEIGHT: 171.06 LBS

## 2022-05-23 DIAGNOSIS — M54.42 CHRONIC LEFT-SIDED LOW BACK PAIN WITH LEFT-SIDED SCIATICA: ICD-10-CM

## 2022-05-23 DIAGNOSIS — M51.36 DDD (DEGENERATIVE DISC DISEASE), LUMBAR: ICD-10-CM

## 2022-05-23 DIAGNOSIS — G89.29 CHRONIC LEFT-SIDED LOW BACK PAIN WITH LEFT-SIDED SCIATICA: ICD-10-CM

## 2022-05-23 DIAGNOSIS — M54.32 SCIATICA OF LEFT SIDE: ICD-10-CM

## 2022-05-23 DIAGNOSIS — S32.040A CLOSED COMPRESSION FRACTURE OF L4 VERTEBRA, INITIAL ENCOUNTER: Primary | ICD-10-CM

## 2022-05-23 PROCEDURE — 72110 X-RAY EXAM L-2 SPINE 4/>VWS: CPT | Mod: 26,,, | Performed by: RADIOLOGY

## 2022-05-23 PROCEDURE — 99204 OFFICE O/P NEW MOD 45 MIN: CPT | Mod: S$GLB,,, | Performed by: REGISTERED NURSE

## 2022-05-23 PROCEDURE — 72110 XR LUMBAR SPINE AP AND LAT WITH FLEX/EXT: ICD-10-PCS | Mod: 26,,, | Performed by: RADIOLOGY

## 2022-05-23 PROCEDURE — 1159F MED LIST DOCD IN RCRD: CPT | Mod: CPTII,S$GLB,, | Performed by: REGISTERED NURSE

## 2022-05-23 PROCEDURE — 99204 PR OFFICE/OUTPT VISIT, NEW, LEVL IV, 45-59 MIN: ICD-10-PCS | Mod: S$GLB,,, | Performed by: REGISTERED NURSE

## 2022-05-23 PROCEDURE — 3044F HG A1C LEVEL LT 7.0%: CPT | Mod: CPTII,S$GLB,, | Performed by: REGISTERED NURSE

## 2022-05-23 PROCEDURE — 72110 X-RAY EXAM L-2 SPINE 4/>VWS: CPT | Mod: TC

## 2022-05-23 PROCEDURE — 3044F PR MOST RECENT HEMOGLOBIN A1C LEVEL <7.0%: ICD-10-PCS | Mod: CPTII,S$GLB,, | Performed by: REGISTERED NURSE

## 2022-05-23 PROCEDURE — 3008F PR BODY MASS INDEX (BMI) DOCUMENTED: ICD-10-PCS | Mod: CPTII,S$GLB,, | Performed by: REGISTERED NURSE

## 2022-05-23 PROCEDURE — 3008F BODY MASS INDEX DOCD: CPT | Mod: CPTII,S$GLB,, | Performed by: REGISTERED NURSE

## 2022-05-23 PROCEDURE — 99999 PR PBB SHADOW E&M-EST. PATIENT-LVL IV: ICD-10-PCS | Mod: PBBFAC,,, | Performed by: REGISTERED NURSE

## 2022-05-23 PROCEDURE — 1159F PR MEDICATION LIST DOCUMENTED IN MEDICAL RECORD: ICD-10-PCS | Mod: CPTII,S$GLB,, | Performed by: REGISTERED NURSE

## 2022-05-23 PROCEDURE — 99999 PR PBB SHADOW E&M-EST. PATIENT-LVL IV: CPT | Mod: PBBFAC,,, | Performed by: REGISTERED NURSE

## 2022-05-23 RX ORDER — GABAPENTIN 300 MG/1
300 CAPSULE ORAL 2 TIMES DAILY
Qty: 60 CAPSULE | Refills: 11 | Status: SHIPPED | OUTPATIENT
Start: 2022-05-23 | End: 2023-01-30 | Stop reason: SDUPTHER

## 2022-05-23 RX ORDER — METHYLPREDNISOLONE 4 MG/1
TABLET ORAL
Qty: 1 EACH | Refills: 0 | Status: SHIPPED | OUTPATIENT
Start: 2022-05-23 | End: 2022-06-13

## 2022-05-23 RX ORDER — METHOCARBAMOL 500 MG/1
500 TABLET, FILM COATED ORAL 4 TIMES DAILY
Qty: 80 TABLET | Refills: 2 | Status: SHIPPED | OUTPATIENT
Start: 2022-05-23 | End: 2022-07-22

## 2022-05-23 NOTE — PROGRESS NOTES
"DATE: 2022  PATIENT: Kalpana Rivera    Supervising Physician: Lane Taylor M.D.    CHIEF COMPLAINT: low back pain    HISTORY:  Kalpana Rivera is a 61 y.o. female with DM2 ( A1c: 6.9) here for initial evaluation of low back and left leg pain (Back - 8, Leg - 9).  The pain in the left lateral thigh is what bothers her most.  The pain has been present for about 5 months. She states she was folding clothes when she felt a pop in her low back after standing. The patient describes the pain as constant dull aching and soreness with intermittent sharp shooting pains in her left thigh.  The pain is worse with walking and standing and improved by nothing. There is associated numbness and tingling. There is no subjective weakness. Prior treatments have included advil, tylenol, gabapentin,  Home exercises and PT, but no ESIs or surgery.  She failed a home exercise program. It was the [facility] spine conditioning program. Exercises include head rolls, kneeling back extension, sitting rotation stretch, modified seated side straddle, knee to chest, bird dog, plank, modified seated plank, hip bridges, abdominal bracing, and abdominal crunch. Pt  completed each exercise 5 times daily for 6-8 weeks.  The patient denies myelopathic symptoms such as handwriting changes or difficulty with buttons/coins/keys. Denies perineal paresthesias, bowel/bladder dysfunction.    PAST MEDICAL/SURGICAL HISTORY:  Past Medical History:   Diagnosis Date    Diabetes mellitus, type 2      Past Surgical History:   Procedure Laterality Date    CARPAL TUNNEL RELEASE       SECTION      2    left thumb      right middle finger      right shoulder         Medications:   Current Outpatient Medications on File Prior to Visit   Medication Sig Dispense Refill    b complex vitamins tablet Take 1 tablet by mouth once daily.      BD ULTRA-FINE SHORT PEN NEEDLE 31 gauge x 5/16" Ndle 4 daily 200 each 3    blood-glucose sensor (DEXCOM G6 " SENSOR) Brittany 1 Device by Misc.(Non-Drug; Combo Route) route every 10 days. 3 each 11    blood-glucose transmitter (DEXCOM G6 TRANSMITTER) Brittany 2 Devices by Misc.(Non-Drug; Combo Route) route every 6 (six) months. 1 each 3    cetirizine (ZYRTEC) 10 MG tablet Take 10 mg by mouth once daily.      dulaglutide (TRULICITY) 0.75 mg/0.5 mL pen injector Inject 0.75 mg into the skin every 7 days. 4 pen 5    fluticasone propionate (FLONASE) 50 mcg/actuation nasal spray 1 spray by Each Nostril route once daily.      insulin aspart U-100 (NOVOLOG PENFILL U-100 INSULIN) 100 unit/mL Crtg To use with INPEN; up to 90 units daily 30 mL 3    insulin degludec (TRESIBA FLEXTOUCH U-200) 200 unit/mL (3 mL) insulin pen Inject 22 Units into the skin once daily. 2 pen 6    Lactobacillus rhamnosus GG (CULTURELLE) 10 billion cell capsule Take 1 capsule by mouth once daily.      metFORMIN (GLUCOPHAGE-XR) 500 MG ER 24hr tablet TAKE 2 TABLETS(1000 MG) BY MOUTH TWICE DAILY WITH MEALS 360 tablet 2    multivitamin with minerals tablet Take 1 tablet by mouth once daily.      TURMERIC ORAL Take by mouth.      UNABLE TO FIND Cranberry & Buchu natural supplement      UNABLE TO FIND Nerve Eight natural supplement      UNABLE TO FIND Mullein natural supplement      [DISCONTINUED] gabapentin (NEURONTIN) 100 MG capsule TAKE 1 CAPSULE(100 MG) BY MOUTH THREE TIMES DAILY 90 capsule 11     No current facility-administered medications on file prior to visit.       Social History:   Social History     Socioeconomic History    Marital status:    Tobacco Use    Smoking status: Never Smoker    Smokeless tobacco: Never Used   Substance and Sexual Activity    Alcohol use: Yes    Drug use: Never       REVIEW OF SYSTEMS:  Constitution: Negative. Negative for chills, fever and night sweats.   Cardiovascular: Negative for chest pain and syncope.   Respiratory: Negative for cough and shortness of breath.   Gastrointestinal: See HPI. Negative for  "nausea/vomiting. Negative for abdominal pain.  Genitourinary: See HPI. Negative for discoloration or dysuria.  Skin: Negative for dry skin, itching and rash.   Hematologic/Lymphatic: Negative for bleeding problem. Does not bruise/bleed easily.   Musculoskeletal: Negative for falls and muscle weakness.   Neurological: See HPI. No seizures.   Endocrine: Negative for polydipsia, polyphagia and polyuria.   Allergic/Immunologic: Negative for hives and persistent infections.     EXAM:  Ht 4' 11.5" (1.511 m)   Wt 77.6 kg (171 lb 1.2 oz)   BMI 33.98 kg/m²     General: The patient is a very pleasant 61 y.o. female in no apparent distress, the patient is oriented to person, place and time.  Psych: Normal mood and affect  HEENT: Vision grossly intact, hearing intact to the spoken word.  Lungs: Respirations unlabored.  Gait: Normal station and gait, no difficulty with toe or heel walk.   Skin: Dorsal lumbar skin negative for rashes, lesions, hairy patches and surgical scars. There is mild lumbar tenderness to palpation.  Range of motion: Lumbar range of motion is acceptable.  Spinal Balance: Global saggital and coronal spinal balance acceptable, not significant for scoliosis and kyphosis.  Musculoskeletal: No pain with the range of motion of the bilateral hips. No trochanteric tenderness to palpation.  Vascular: Bilateral lower extremities warm and well perfused, dorsalis pedis pulses 2+ bilaterally.  Neurological: Normal strength and tone in all major motor groups in the bilateral lower extremities. Normal sensation to light touch in the L2-S1 dermatomes bilaterally.  Deep tendon reflexes symmetric 2+ in the bilateral lower extremities.  Negative Babinski bilaterally. Straight leg raise positive bilaterally.    IMAGING:      Today I personally reviewed AP, Lat and Flex/Ex  upright L-spine films that demonstrate L4 compression fracture. No instability. Mild DDD.       Body mass index is 33.98 kg/m².    Hemoglobin A1C   Date " Value Ref Range Status   03/28/2022 6.9 (H) 4.0 - 5.6 % Final     Comment:     ADA Screening Guidelines:  5.7-6.4%  Consistent with prediabetes  >or=6.5%  Consistent with diabetes    High levels of fetal hemoglobin interfere with the HbA1C  assay. Heterozygous hemoglobin variants (HbS, HgC, etc)do  not significantly interfere with this assay.   However, presence of multiple variants may affect accuracy.     12/03/2021 6.9 (H) 4.0 - 5.6 % Final     Comment:     ADA Screening Guidelines:  5.7-6.4%  Consistent with prediabetes  >or=6.5%  Consistent with diabetes    High levels of fetal hemoglobin interfere with the HbA1C  assay. Heterozygous hemoglobin variants (HbS, HgC, etc)do  not significantly interfere with this assay.   However, presence of multiple variants may affect accuracy.     08/04/2021 6.8 (H) 4.0 - 5.6 % Final     Comment:     ADA Screening Guidelines:  5.7-6.4%  Consistent with prediabetes  >or=6.5%  Consistent with diabetes    High levels of fetal hemoglobin interfere with the HbA1C  assay. Heterozygous hemoglobin variants (HbS, HgC, etc)do  not significantly interfere with this assay.   However, presence of multiple variants may affect accuracy.             ASSESSMENT/PLAN:    Kalpana was seen today for establish care and pain.    Diagnoses and all orders for this visit:    Closed compression fracture of L4 vertebra, initial encounter  -     MRI Lumbar Spine Without Contrast; Future    Sciatica of left side  -     Ambulatory referral/consult to [facility]    Chronic left-sided low back pain with left-sided sciatica  -     gabapentin (NEURONTIN) 300 MG capsule; Take 1 capsule (300 mg total) by mouth 2 (two) times daily.  -     methocarbamoL (ROBAXIN) 500 MG Tab; Take 1 tablet (500 mg total) by mouth 4 (four) times daily.  -     methylPREDNISolone (MEDROL DOSEPACK) 4 mg tablet; use as directed  -     MRI Lumbar Spine Without Contrast; Future      Today we discussed at length all of the different  treatment options including anti-inflammatories, acetaminophen, rest, ice, heat, physical therapy including strengthening and stretching exercises, home exercises, ROM, aerobic conditioning, aqua therapy, other modalities including ultrasound, massage, and dry needling, epidural steroid injections and finally surgical intervention.      Medrol dose pack, robaxin and gabapentin sent to pharmacy. Lumbar MRI ordered, I will call with results.   The patient was instructed to check her blood sugar frequently and treat accordingly while on medrol dose pack.

## 2022-06-06 NOTE — PROGRESS NOTES
Established Patient - Audio Only Telehealth Visit     The patient location is: home  The chief complaint leading to consultation is: MRI results  Visit type: Virtual visit with audio only (telephone)  Total time spent with patient: 10min     The reason for the audio only service rather than synchronous audio and video virtual visit was related to technical difficulties or patient preference/necessity.     Each patient to whom I provide medical services by telemedicine is:  (1) informed of the relationship between the physician and patient and the respective role of any other health care provider with respect to management of the patient; and (2) notified that they may decline to receive medical services by telemedicine and may withdraw from such care at any time. Patient verbally consented to receive this service via voice-only telephone call.    DATE: 6/7/2022  PATIENT: Kalpana Rivera    Attending Physician: Lane Taylor M.D.    HISTORY:  Kalpana Rivera is a 61 y.o. female who returns to me today for MRI results.  She was last seen by me 5/23/2022.  Today she is doing well but notes continued low back and left leg pain.    The Patient denies myelopathic symptoms such as handwriting changes or difficulty with buttons/coins/keys. Denies perineal paresthesias, bowel/bladder dysfunction.    PMH/PSH/FamHx/SocHx:  Unchanged from prior visit    ROS:  REVIEW OF SYSTEMS:  Constitution: Negative. Negative for chills, fever and night sweats.   HENT: Negative for congestion and headaches.    Eyes: Negative for blurred vision, left vision loss and right vision loss.   Cardiovascular: Negative for chest pain and syncope.   Respiratory: Negative for cough and shortness of breath.    Endocrine: Negative for polydipsia, polyphagia and polyuria.   Hematologic/Lymphatic: Negative for bleeding problem. Does not bruise/bleed easily.   Skin: Negative for dry skin, itching and rash.   Musculoskeletal: Negative for falls and  muscle weakness.   Gastrointestinal: Negative for abdominal pain and bowel incontinence.   Allergic/Immunologic: Negative for hives and persistent infections.  Genitourinary: Negative for urinary retention/incontinence and nocturia.   Neurological: negative for disturbances in coordination, no myelopathic symptoms such as handwriting changes or difficulty with buttons, coins, keys or small objects. No loss of balance and seizures.   Psychiatric/Behavioral: Negative for depression. The patient does not have insomnia.   Denies perineal paresthesias, bowel or bladder incontinence    EXAM:  There were no vitals taken for this visit.    Neuro and physical exam stable.       IMAGING:    Today I personally re- reviewed AP, Lat and Flex/Ex  upright L-spine that demonstrate L4 compression fracture. No instability. Mild DDD.      Lumbar MRI shows degenerative changes at L4 5 and L5-S1 resulting and mild central stenosis at L4-5 and mild left-sided foraminal stenosis at L5-S1.     There is no height or weight on file to calculate BMI.    Hemoglobin A1C   Date Value Ref Range Status   03/28/2022 6.9 (H) 4.0 - 5.6 % Final     Comment:     ADA Screening Guidelines:  5.7-6.4%  Consistent with prediabetes  >or=6.5%  Consistent with diabetes    High levels of fetal hemoglobin interfere with the HbA1C  assay. Heterozygous hemoglobin variants (HbS, HgC, etc)do  not significantly interfere with this assay.   However, presence of multiple variants may affect accuracy.     12/03/2021 6.9 (H) 4.0 - 5.6 % Final     Comment:     ADA Screening Guidelines:  5.7-6.4%  Consistent with prediabetes  >or=6.5%  Consistent with diabetes    High levels of fetal hemoglobin interfere with the HbA1C  assay. Heterozygous hemoglobin variants (HbS, HgC, etc)do  not significantly interfere with this assay.   However, presence of multiple variants may affect accuracy.     08/04/2021 6.8 (H) 4.0 - 5.6 % Final     Comment:     ADA Screening  Guidelines:  5.7-6.4%  Consistent with prediabetes  >or=6.5%  Consistent with diabetes    High levels of fetal hemoglobin interfere with the HbA1C  assay. Heterozygous hemoglobin variants (HbS, HgC, etc)do  not significantly interfere with this assay.   However, presence of multiple variants may affect accuracy.           ASSESSMENT/PLAN:    Diagnoses and all orders for this visit:    Lumbar radiculopathy        Today we discussed at length all of the different treatment options including anti-inflammatories, acetaminophen, rest, ice, heat, physical therapy including strengthening and stretching exercises, home exercises, ROM, aerobic conditioning, aqua therapy, other modalities including ultrasound, massage, and dry needling, epidural steroid injections and finally surgical intervention.      The patient would like to hold off on a left L4/5 & L5/S1 TF ROSAURA. She will discuss the procedure with her son who is a PT and update me when she has a plan.      This service was not originating from a related E/M service provided within the previous 7 days nor will  to an E/M service or procedure within the next 24 hours or my soonest available appointment.  Prevailing standard of care was able to be met in this audio-only visit.

## 2022-06-07 ENCOUNTER — OFFICE VISIT (OUTPATIENT)
Dept: ORTHOPEDICS | Facility: CLINIC | Age: 62
End: 2022-06-07
Payer: COMMERCIAL

## 2022-06-07 ENCOUNTER — PATIENT MESSAGE (OUTPATIENT)
Dept: ORTHOPEDICS | Facility: CLINIC | Age: 62
End: 2022-06-07
Payer: COMMERCIAL

## 2022-06-07 DIAGNOSIS — M54.16 LUMBAR RADICULOPATHY: Primary | ICD-10-CM

## 2022-06-07 PROCEDURE — 3044F HG A1C LEVEL LT 7.0%: CPT | Mod: CPTII,95,, | Performed by: REGISTERED NURSE

## 2022-06-07 PROCEDURE — 3044F PR MOST RECENT HEMOGLOBIN A1C LEVEL <7.0%: ICD-10-PCS | Mod: CPTII,95,, | Performed by: REGISTERED NURSE

## 2022-06-07 PROCEDURE — 99213 OFFICE O/P EST LOW 20 MIN: CPT | Mod: 95,,, | Performed by: REGISTERED NURSE

## 2022-06-07 PROCEDURE — 99213 PR OFFICE/OUTPT VISIT, EST, LEVL III, 20-29 MIN: ICD-10-PCS | Mod: 95,,, | Performed by: REGISTERED NURSE

## 2022-06-07 PROCEDURE — 1159F MED LIST DOCD IN RCRD: CPT | Mod: CPTII,95,, | Performed by: REGISTERED NURSE

## 2022-06-07 PROCEDURE — 1160F RVW MEDS BY RX/DR IN RCRD: CPT | Mod: CPTII,95,, | Performed by: REGISTERED NURSE

## 2022-06-07 PROCEDURE — 1160F PR REVIEW ALL MEDS BY PRESCRIBER/CLIN PHARMACIST DOCUMENTED: ICD-10-PCS | Mod: CPTII,95,, | Performed by: REGISTERED NURSE

## 2022-06-07 PROCEDURE — 1159F PR MEDICATION LIST DOCUMENTED IN MEDICAL RECORD: ICD-10-PCS | Mod: CPTII,95,, | Performed by: REGISTERED NURSE

## 2022-06-20 ENCOUNTER — TELEPHONE (OUTPATIENT)
Dept: OPHTHALMOLOGY | Facility: CLINIC | Age: 62
End: 2022-06-20
Payer: COMMERCIAL

## 2022-06-20 NOTE — TELEPHONE ENCOUNTER
----- Message from Cresencio Gross sent at 6/20/2022  9:33 AM CDT -----  Contact: pt  Type:  Sooner Apoointment Request    Caller is requesting a sooner appointment.  Caller declined first available appointment listed below.  Caller will not accept being placed on the waitlist and is requesting a message be sent to doctor.  Name of Caller: pt   When is the first available appointment? N/a  Symptoms:annual/  diabetes  Would the patient rather a call back or a response via [facility]? Call   Best Call Back Number:[phone]  Additional Information:

## 2022-08-02 ENCOUNTER — PATIENT MESSAGE (OUTPATIENT)
Dept: ENDOCRINOLOGY | Facility: CLINIC | Age: 62
End: 2022-08-02
Payer: COMMERCIAL

## 2022-08-03 ENCOUNTER — PATIENT MESSAGE (OUTPATIENT)
Dept: ENDOCRINOLOGY | Facility: CLINIC | Age: 62
End: 2022-08-03
Payer: COMMERCIAL

## 2022-08-03 NOTE — TELEPHONE ENCOUNTER
Component      Latest Ref Rng & Units 8/2/2022   Sodium      136 - 145 mmol/L 139   Potassium      3.5 - 5.1 mmol/L 4.7   Chloride      95 - 110 mmol/L 99   CO2      23 - 29 mmol/L 29   Glucose      70 - 110 mg/dL 197 (H)   BUN      7 - 17 mg/dL 5 (L)   Creatinine      0.50 - 1.40 mg/dL 0.54   Calcium      8.7 - 10.5 mg/dL 8.8   PROTEIN TOTAL      6.0 - 8.4 g/dL 6.9   Albumin      3.5 - 5.2 g/dL 4.3   BILIRUBIN TOTAL      0.1 - 1.0 mg/dL 0.6   Alkaline Phosphatase      38 - 126 U/L 72   AST      15 - 46 U/L 37   ALT      10 - 44 U/L 32   Anion Gap      8 - 16 mmol/L 11   eGFR      >60 mL/min/1.73 m:2 >60.0   Microalbumin, Urine      ug/mL <5.0   Creatinine, Urine      15.0 - 325.0 mg/dL 13.0 (L)   MICROALB/CREAT RATIO      0.0 - 30.0 ug/mg Unable to calculate   Hemoglobin A1C External      4.0 - 5.6 % 7.1 (H)   Estimated Avg Glucose      68 - 131 mg/dL 157 (H)

## 2022-08-26 ENCOUNTER — PATIENT MESSAGE (OUTPATIENT)
Dept: ENDOCRINOLOGY | Facility: CLINIC | Age: 62
End: 2022-08-26
Payer: COMMERCIAL

## 2022-08-26 DIAGNOSIS — E10.9 TYPE 1 DIABETES MELLITUS WITHOUT COMPLICATION: ICD-10-CM

## 2022-08-26 RX ORDER — INSULIN ASPART 100 [IU]/ML
INJECTION, SOLUTION INTRAVENOUS; SUBCUTANEOUS
Qty: 45 ML | Refills: 1 | Status: SHIPPED | OUTPATIENT
Start: 2022-08-26 | End: 2023-01-30 | Stop reason: SDUPTHER

## 2022-08-26 RX ORDER — PEN NEEDLE, DIABETIC 30 GX3/16"
NEEDLE, DISPOSABLE MISCELLANEOUS
Qty: 400 EACH | Refills: 1 | Status: SHIPPED | OUTPATIENT
Start: 2022-08-26 | End: 2022-12-20 | Stop reason: SDUPTHER

## 2022-08-26 RX ORDER — METFORMIN HYDROCHLORIDE 500 MG/1
1000 TABLET, EXTENDED RELEASE ORAL 2 TIMES DAILY WITH MEALS
Qty: 360 TABLET | Refills: 1 | Status: SHIPPED | OUTPATIENT
Start: 2022-08-26 | End: 2022-12-20 | Stop reason: SDUPTHER

## 2022-09-01 LAB
LEFT EYE DM RETINOPATHY: POSITIVE
RIGHT EYE DM RETINOPATHY: POSITIVE

## 2022-11-04 DIAGNOSIS — E10.9 TYPE 1 DIABETES MELLITUS WITHOUT COMPLICATION: ICD-10-CM

## 2022-11-07 RX ORDER — DULAGLUTIDE 0.75 MG/.5ML
0.75 INJECTION, SOLUTION SUBCUTANEOUS
Qty: 4 PEN | Refills: 5 | Status: SHIPPED | OUTPATIENT
Start: 2022-11-07 | End: 2023-01-30 | Stop reason: SDUPTHER

## 2022-12-20 DIAGNOSIS — E10.9 TYPE 1 DIABETES MELLITUS WITHOUT COMPLICATION: ICD-10-CM

## 2022-12-20 RX ORDER — PEN NEEDLE, DIABETIC 30 GX3/16"
NEEDLE, DISPOSABLE MISCELLANEOUS
Qty: 400 EACH | Refills: 1 | Status: SHIPPED | OUTPATIENT
Start: 2022-12-20 | End: 2023-09-06

## 2022-12-20 RX ORDER — BLOOD-GLUCOSE SENSOR
1 EACH MISCELLANEOUS
Qty: 3 EACH | Refills: 11 | Status: SHIPPED | OUTPATIENT
Start: 2022-12-20 | End: 2023-05-30

## 2022-12-20 RX ORDER — METFORMIN HYDROCHLORIDE 500 MG/1
1000 TABLET, EXTENDED RELEASE ORAL 2 TIMES DAILY WITH MEALS
Qty: 360 TABLET | Refills: 1 | Status: SHIPPED | OUTPATIENT
Start: 2022-12-20 | End: 2023-01-30 | Stop reason: SDUPTHER

## 2023-01-15 ENCOUNTER — PATIENT MESSAGE (OUTPATIENT)
Dept: ENDOCRINOLOGY | Facility: CLINIC | Age: 63
End: 2023-01-15
Payer: COMMERCIAL

## 2023-01-30 ENCOUNTER — LAB VISIT (OUTPATIENT)
Dept: LAB | Facility: HOSPITAL | Age: 63
End: 2023-01-30
Payer: COMMERCIAL

## 2023-01-30 ENCOUNTER — OFFICE VISIT (OUTPATIENT)
Dept: ENDOCRINOLOGY | Facility: CLINIC | Age: 63
End: 2023-01-30
Payer: COMMERCIAL

## 2023-01-30 ENCOUNTER — PATIENT MESSAGE (OUTPATIENT)
Dept: ENDOCRINOLOGY | Facility: CLINIC | Age: 63
End: 2023-01-30

## 2023-01-30 VITALS
BODY MASS INDEX: 34.37 KG/M2 | WEIGHT: 173.06 LBS | DIASTOLIC BLOOD PRESSURE: 80 MMHG | SYSTOLIC BLOOD PRESSURE: 138 MMHG

## 2023-01-30 DIAGNOSIS — E04.1 THYROID NODULE: ICD-10-CM

## 2023-01-30 DIAGNOSIS — E10.3393 TYPE 1 DIABETES MELLITUS WITH MODERATE NONPROLIFERATIVE RETINOPATHY OF BOTH EYES WITHOUT MACULAR EDEMA: ICD-10-CM

## 2023-01-30 DIAGNOSIS — E10.9 TYPE 1 DIABETES MELLITUS WITHOUT COMPLICATION: ICD-10-CM

## 2023-01-30 DIAGNOSIS — M54.42 CHRONIC LEFT-SIDED LOW BACK PAIN WITH LEFT-SIDED SCIATICA: ICD-10-CM

## 2023-01-30 DIAGNOSIS — E66.01 CLASS 2 SEVERE OBESITY WITH SERIOUS COMORBIDITY AND BODY MASS INDEX (BMI) OF 35.0 TO 35.9 IN ADULT, UNSPECIFIED OBESITY TYPE: ICD-10-CM

## 2023-01-30 DIAGNOSIS — G89.29 CHRONIC LEFT-SIDED LOW BACK PAIN WITH LEFT-SIDED SCIATICA: ICD-10-CM

## 2023-01-30 LAB
ALBUMIN SERPL BCP-MCNC: 3.8 G/DL (ref 3.5–5.2)
ALP SERPL-CCNC: 77 U/L (ref 55–135)
ALT SERPL W/O P-5'-P-CCNC: 25 U/L (ref 10–44)
ANION GAP SERPL CALC-SCNC: 13 MMOL/L (ref 8–16)
AST SERPL-CCNC: 22 U/L (ref 10–40)
BILIRUB SERPL-MCNC: 0.3 MG/DL (ref 0.1–1)
BUN SERPL-MCNC: 9 MG/DL (ref 8–23)
CALCIUM SERPL-MCNC: 9.6 MG/DL (ref 8.7–10.5)
CHLORIDE SERPL-SCNC: 106 MMOL/L (ref 95–110)
CO2 SERPL-SCNC: 22 MMOL/L (ref 23–29)
CREAT SERPL-MCNC: 0.6 MG/DL (ref 0.5–1.4)
EST. GFR  (NO RACE VARIABLE): >60 ML/MIN/1.73 M^2
ESTIMATED AVG GLUCOSE: 148 MG/DL (ref 68–131)
GLUCOSE SERPL-MCNC: 133 MG/DL (ref 70–110)
HBA1C MFR BLD: 6.8 % (ref 4–5.6)
POTASSIUM SERPL-SCNC: 4.1 MMOL/L (ref 3.5–5.1)
PROT SERPL-MCNC: 6.8 G/DL (ref 6–8.4)
SODIUM SERPL-SCNC: 141 MMOL/L (ref 136–145)
TSH SERPL DL<=0.005 MIU/L-ACNC: 2.07 UIU/ML (ref 0.4–4)

## 2023-01-30 PROCEDURE — 99999 PR PBB SHADOW E&M-EST. PATIENT-LVL V: ICD-10-PCS | Mod: PBBFAC,,, | Performed by: NURSE PRACTITIONER

## 2023-01-30 PROCEDURE — 1159F PR MEDICATION LIST DOCUMENTED IN MEDICAL RECORD: ICD-10-PCS | Mod: CPTII,S$GLB,, | Performed by: NURSE PRACTITIONER

## 2023-01-30 PROCEDURE — 3079F PR MOST RECENT DIASTOLIC BLOOD PRESSURE 80-89 MM HG: ICD-10-PCS | Mod: CPTII,S$GLB,, | Performed by: NURSE PRACTITIONER

## 2023-01-30 PROCEDURE — 80053 COMPREHEN METABOLIC PANEL: CPT | Performed by: NURSE PRACTITIONER

## 2023-01-30 PROCEDURE — 3075F SYST BP GE 130 - 139MM HG: CPT | Mod: CPTII,S$GLB,, | Performed by: NURSE PRACTITIONER

## 2023-01-30 PROCEDURE — 1160F RVW MEDS BY RX/DR IN RCRD: CPT | Mod: CPTII,S$GLB,, | Performed by: NURSE PRACTITIONER

## 2023-01-30 PROCEDURE — 99214 OFFICE O/P EST MOD 30 MIN: CPT | Mod: 25,S$GLB,, | Performed by: NURSE PRACTITIONER

## 2023-01-30 PROCEDURE — 1159F MED LIST DOCD IN RCRD: CPT | Mod: CPTII,S$GLB,, | Performed by: NURSE PRACTITIONER

## 2023-01-30 PROCEDURE — 95251 CONT GLUC MNTR ANALYSIS I&R: CPT | Mod: S$GLB,,, | Performed by: NURSE PRACTITIONER

## 2023-01-30 PROCEDURE — 99999 PR PBB SHADOW E&M-EST. PATIENT-LVL V: CPT | Mod: PBBFAC,,, | Performed by: NURSE PRACTITIONER

## 2023-01-30 PROCEDURE — 3008F PR BODY MASS INDEX (BMI) DOCUMENTED: ICD-10-PCS | Mod: CPTII,S$GLB,, | Performed by: NURSE PRACTITIONER

## 2023-01-30 PROCEDURE — 99214 PR OFFICE/OUTPT VISIT, EST, LEVL IV, 30-39 MIN: ICD-10-PCS | Mod: 25,S$GLB,, | Performed by: NURSE PRACTITIONER

## 2023-01-30 PROCEDURE — 3079F DIAST BP 80-89 MM HG: CPT | Mod: CPTII,S$GLB,, | Performed by: NURSE PRACTITIONER

## 2023-01-30 PROCEDURE — 95251 PR GLUCOSE MONITOR, 72 HOUR, PHYS INTERP: ICD-10-PCS | Mod: S$GLB,,, | Performed by: NURSE PRACTITIONER

## 2023-01-30 PROCEDURE — 83036 HEMOGLOBIN GLYCOSYLATED A1C: CPT | Performed by: NURSE PRACTITIONER

## 2023-01-30 PROCEDURE — 84443 ASSAY THYROID STIM HORMONE: CPT | Performed by: NURSE PRACTITIONER

## 2023-01-30 PROCEDURE — 36415 COLL VENOUS BLD VENIPUNCTURE: CPT | Performed by: NURSE PRACTITIONER

## 2023-01-30 PROCEDURE — 3008F BODY MASS INDEX DOCD: CPT | Mod: CPTII,S$GLB,, | Performed by: NURSE PRACTITIONER

## 2023-01-30 PROCEDURE — 1160F PR REVIEW ALL MEDS BY PRESCRIBER/CLIN PHARMACIST DOCUMENTED: ICD-10-PCS | Mod: CPTII,S$GLB,, | Performed by: NURSE PRACTITIONER

## 2023-01-30 PROCEDURE — 3075F PR MOST RECENT SYSTOLIC BLOOD PRESS GE 130-139MM HG: ICD-10-PCS | Mod: CPTII,S$GLB,, | Performed by: NURSE PRACTITIONER

## 2023-01-30 RX ORDER — GABAPENTIN 300 MG/1
300 CAPSULE ORAL 2 TIMES DAILY
Qty: 60 CAPSULE | Refills: 11 | Status: SHIPPED | OUTPATIENT
Start: 2023-01-30 | End: 2023-07-07 | Stop reason: SDUPTHER

## 2023-01-30 RX ORDER — DULAGLUTIDE 0.75 MG/.5ML
0.75 INJECTION, SOLUTION SUBCUTANEOUS
Qty: 4 PEN | Refills: 5 | Status: SHIPPED | OUTPATIENT
Start: 2023-01-30 | End: 2023-01-30 | Stop reason: SDUPTHER

## 2023-01-30 RX ORDER — DULAGLUTIDE 0.75 MG/.5ML
0.75 INJECTION, SOLUTION SUBCUTANEOUS
Qty: 4 PEN | Refills: 5 | Status: SHIPPED | OUTPATIENT
Start: 2023-01-30 | End: 2023-02-02 | Stop reason: SDUPTHER

## 2023-01-30 RX ORDER — METFORMIN HYDROCHLORIDE 500 MG/1
1000 TABLET, EXTENDED RELEASE ORAL 2 TIMES DAILY WITH MEALS
Qty: 360 TABLET | Refills: 1 | Status: SHIPPED | OUTPATIENT
Start: 2023-01-30 | End: 2023-05-30 | Stop reason: SDUPTHER

## 2023-01-30 RX ORDER — INSULIN ASPART 100 [IU]/ML
INJECTION, SOLUTION INTRAVENOUS; SUBCUTANEOUS
Qty: 45 ML | Refills: 1 | Status: SHIPPED | OUTPATIENT
Start: 2023-01-30 | End: 2023-06-26

## 2023-01-30 RX ORDER — INSULIN DEGLUDEC 200 U/ML
24 INJECTION, SOLUTION SUBCUTANEOUS DAILY
Qty: 2 PEN | Refills: 3 | Status: SHIPPED | OUTPATIENT
Start: 2023-01-30 | End: 2023-03-01

## 2023-01-30 RX ORDER — PRAVASTATIN SODIUM 10 MG/1
10 TABLET ORAL DAILY
Qty: 90 TABLET | Refills: 3 | Status: SHIPPED | OUTPATIENT
Start: 2023-01-30 | End: 2023-01-30 | Stop reason: SDUPTHER

## 2023-01-30 RX ORDER — INSULIN DEGLUDEC 200 U/ML
INJECTION, SOLUTION SUBCUTANEOUS
COMMUNITY
Start: 2022-12-25 | End: 2023-01-30 | Stop reason: SDUPTHER

## 2023-01-30 RX ORDER — METFORMIN HYDROCHLORIDE 500 MG/1
1000 TABLET, EXTENDED RELEASE ORAL 2 TIMES DAILY WITH MEALS
Qty: 360 TABLET | Refills: 1 | Status: SHIPPED | OUTPATIENT
Start: 2023-01-30 | End: 2023-01-30 | Stop reason: SDUPTHER

## 2023-01-30 RX ORDER — PRAVASTATIN SODIUM 10 MG/1
10 TABLET ORAL DAILY
Qty: 90 TABLET | Refills: 3 | Status: SHIPPED | OUTPATIENT
Start: 2023-01-30 | End: 2023-03-24

## 2023-01-30 NOTE — ASSESSMENT & PLAN NOTE
-- Small stable nodule on US in 2020  -- Denies compressive symptoms  -- Repeat thyroid US    Lactation visit with 3rd time mom, mom states breastfeeding went well so far and baby latched for 30 minutes. Reviewed first 24 hours, feeding on demand 8-10x per day based on hunger cues and wet and dirty requirements, verbalized understanding. Lactation to follow up tomorrow, may want early DC.

## 2023-01-30 NOTE — PATIENT INSTRUCTIONS
Dr Adams Moncada     Thyroid Nodule    Check thyroid US    Cholesterol   Start pravastatin 10 mg daily    Diabetes   Continue current regimen   Start INPEN-will consult education

## 2023-01-30 NOTE — ASSESSMENT & PLAN NOTE
-- Reviewed goals of therapy are to get the best control we can without hypoglycemia  -- Treat as type 1 diabetic. Continue Trulicity due to satiety effects which may help with weight loss.  A1c at goal without significant hypoglycemia  Check labs  Medication changes:     Dexcom reviewed: prolonged PP hyperglycemia after dinner and occ hypoglycemia overnight and before dinner. I believe half units of insulin will help her               Continue   Tresiba 24 units daily   For now, continue below.              Continue   Metformin 1000 mg twice daily  Trulicity 0.75 mg weekly    Continue                               Breakfast:            4 Units              Lunch:                  5 Units              Dinner:                 6-7 Units    Add 1.5 units with bedtime snack if you have it when you start inpen. For now, just give novolog 1-2 units for high carb bedtime snack.  Consult diabetes education for inpen  Start pravastatin 10 mg daily  Will likely change to half units of all novolog insulin above when she starts inpen.      -- Please try to give insulin at least 10 minutes before a meal.   -- Reviewed patient's current insulin regimen. Clarified proper insulin dose and timing in relation to meals, etc. Insulin injection sites and proper rotation instructed.    -- Advised frequent self blood glucose monitoring.  Patient encouraged to document glucose results and bring them to every clinic visit    -- Hypoglycemia precautions discussed. Instructed on precautions before driving.    -- Call for Bg repeatedly < 90 or > 180.   -- Close adherence to lifestyle changes recommended.   -- Periodic follow ups for eye evaluations, foot care and dental care suggested.  -- Given information on diabetic snacks and hypoglycemia previously  Cholesterol at goal without medications

## 2023-01-30 NOTE — PROGRESS NOTES
Subjective:      Patient ID: Kalpana Rivera is a 62 y.o. female.    Chief Complaint:  No chief complaint on file.    History of Present Illness  Kalpana Rivera presents today for follow up of JULIANN now managed as type 1 diabetic-insulin dependent. Previous patient of Dr. Zeng and myself. Last seen in April 2022.      Ref. Range 9/14/2020 07:45   C-Peptide Latest Ref Range: 0.80 - 3.85 ng/mL 0.10 (L)      Ref. Range 9/14/2020 07:45   Glucose Latest Ref Range: 65 - 99 mg/dL 154 (H)     Glutamate decarboxylase 65 Ab Latest Ref Range: <5 IU/mL 97 (H)     She presents today with outside labs from [facility] with glucose of 41 -see media tab. Since her last visit, has noticed higher blood sugars overnight and increased her Tresiba to 24 units. She is also having more numbness in her lower extremities, thinks it is coming from her back. She is seeing back specialist.     With regards to the diabetes:    Diagnosed with diabetes age 24; diagnosed as JULIANN in 2020  Started insulin during first pregnancy and able to come off. In 1992 had son and has been on insulin since that time  Nephew is type 1  Family History of Type 2 DM: mother and father  Known complications:  DKA/HHS: twice in the past  RN: denies; up to date with eye exam --sees outside   PN: now on gabapentin- not really helping- on and off pain in thighs -thinks due to sciatica; saw podiatry in Aug 2021  Nephropathy: -; due   Gastroparesis: denies  CAD: denies    Current regimen:    Since her last visit, she went to education for INPEN but she did not start -did not have cartridges.   For now, she is using novolog pens. She has cartridges and will try to start. Will let me know if she needs education again. She did not start INPEN since her last visit    Fixed Mode:  Maximum Calculated Dose:          Duration of insulin Action:         4 Hours  Target Blood Glucose:             mg/dL  Insulin Sensitivity Factor:           Fixed Dose Setting:                                        Breakfast:            4 Units              Lunch:                 5 Units              Dinner:                6-7 Units  Active Insulin:                   ON     Long-Acting Insulin Reminder:  Insulin Type:              Tresiba  Doses per day:          Once Daily   Usual Amount:          24 Units  Time:                           AM                Metformin 1000 mg twice daily   Trulicity 0.75 mg weekly- still with nausea with Trulicity-states she is feeling the satiety effects that comes and goes-- does not want to increased dose    Noticed better BGs since Trulicity    States she changes her novolog based on what she is eating at times; sometimes reduces novolog to 4-5 units     Has been taking novolog 10-15 minutes prior to a meal most days. Takes right before a meal if her BG is <110.  States she is afraid to give insulin at times before a meal because she is afraid she will drop- takes nursing home through meal if her BG <100 (states this occurs 3 times a month). She is changing needle every time and rotating injection sites.     Missed doses-denies     Other medications tried:  Trulicity 1.5 mg weekly- very low appetite so stopped  levemir    4 times daily  See media tab.   She loves the dexcom.   Average    GMI 7.1%  71% in target; 21% high; 6% very high    Hypoglycemic event 1%  Yes, did not need assistance   Knows how to correct with 15 grams of carbs- juice, coke, or a peppermint.     Dietary recall: She feels that she is following diabetic diet  Eats 3 meals a day. States that she has noticed portion sizes decreased because she is not as hungry.  B: greek yogurt with splenda or protein shake -premier (7-7:30)  L: healthy choice or grilled cheese sandwich (12-1)  D: same as lunch (6:30-7)   She does not cook often but has some frozen foods.   She has been working from home.   Snacks: not really snacking as much anymore; pickles and cashews; carrots; not normally snacking after  dinner  Drinks: water, green tea, and diet luli  Had box of raisins before     Exercise -She is still doing PT exercises she learned. She knows that she needs to use her treadmill. Has not restarted yet but plans to restart. She wants to start working with a . She went back to her office and does not have much time.    Education - last visit: 2/2022     Has a Medic alert tag-has bracelet   Glucagon/Baqsimi-declines; lives alone    Diabetes Management Status  Statin: Not taking  ACE/ARB: Not taking    Lab Results   Component Value Date    HGBA1C 7.1 (H) 08/02/2022    HGBA1C 6.9 (H) 03/28/2022    HGBA1C 6.9 (H) 12/03/2021     Screening or Prevention Patient's value Goal Complete/Controlled?   HgA1C Testing and Control   Lab Results   Component Value Date    HGBA1C 7.1 (H) 08/02/2022      Annually/Less than 8% Yes   Lipid profile : 03/28/2022 Annually Yes   LDL control Lab Results   Component Value Date    LDLCALC 65.4 03/28/2022    Annually/Less than 100 mg/dl  Yes   Nephropathy screening Lab Results   Component Value Date    LABMICR <5.0 08/02/2022     No results found for: PROTEINUA Annually No   Blood pressure BP Readings from Last 1 Encounters:   01/30/23 138/80    Less than 140/90 Yes   Dilated retinal exam : 06/03/2022 Annually Yes   Foot exam   : 01/30/2023 Annually Yes     With regards to thyroid nodule,     Remembers being told in the past that she had a thyroid nodule and needed biopsy which she did not do.     Denies hoarseness or dysphagia.     No radiation to head or neck. No history of thyroid cancer in family.     Thyroid US 11.3.2020  RIGHT LOBE:   Right lobe measures 5.53 x 1.59 x 1.85 cm.   Mid lobe nodule measures 0.66 x 0.63 x 0.69 cm.Hypoechoic.   LEFT LOBE:   Left lobe measures 4.78 x 1.47 x 1.49 cm.   ISTHMUS:  Isthmus measures 0.21 cm.   LYMPH NODES:  Observed lymph nodes appear benign.   COMPARISON:  None   Impression:   Enlarged right lobe with a well-defined subcentimeter hypoechoic  nodule in the midportion of the right lobe. Small cysts bilaterally.   RECOMMENDATIONS:  Repeat thyroid ultrasound in 2 years.    With regards to dyslipidemia,     Not on medications for cholesterol            Of note, she had elevated CO2 --declines sleep study at this time.    Review of Systems   Constitutional:  Negative for fatigue and unexpected weight change.   Eyes:  Negative for visual disturbance.   Endocrine: Negative for polydipsia, polyphagia and polyuria.   Objective:   Physical Exam  Constitutional:       Appearance: Normal appearance.   Cardiovascular:      Rate and Rhythm: Normal rate.   Neurological:      Mental Status: She is alert.   Denies injection site edema or erythema. No lipo hypertropthy or atrophy  Diabetes Foot Exam:  Feet no cuts or scratches  Shoes appropriate  sensation intact to vibration and monofilament    Visit Vitals  /80 (BP Location: Left arm, Patient Position: Sitting)   Wt 78.5 kg (173 lb 1 oz)   BMI 34.37 kg/m²        Lab Review:   Lab Results   Component Value Date    HGBA1C 7.1 (H) 08/02/2022    HGBA1C 6.9 (H) 03/28/2022    HGBA1C 6.9 (H) 12/03/2021      Lab Results   Component Value Date    CHOL 134 03/28/2022    HDL 61 03/28/2022    LDLCALC 65.4 03/28/2022    TRIG 38 03/28/2022    CHOLHDL 45.5 03/28/2022     Lab Results   Component Value Date     08/02/2022    K 4.7 08/02/2022    CL 99 08/02/2022    CO2 29 08/02/2022     (H) 08/02/2022    BUN 5 (L) 08/02/2022    CREATININE 0.54 08/02/2022    CALCIUM 8.8 08/02/2022    PROT 6.9 08/02/2022    ALBUMIN 4.3 08/02/2022    BILITOT 0.6 08/02/2022    ALKPHOS 72 08/02/2022    AST 37 08/02/2022    ALT 32 08/02/2022    ANIONGAP 11 08/02/2022    ESTGFRAFRICA >60.0 03/28/2022    EGFRNONAA >60.0 03/28/2022    TSH 1.80 09/14/2020     No results found for: [id number]  Assessment and Plan     1. Type 1 diabetes mellitus without complication  Comprehensive Metabolic Panel    Hemoglobin A1C    Ambulatory referral/consult  to Ophthalmology    Ambulatory referral/consult to Diabetes Education    pravastatin (PRAVACHOL) 10 MG tablet    dulaglutide (TRULICITY) 0.75 mg/0.5 mL pen injector    metFORMIN (GLUCOPHAGE-XR) 500 MG ER 24hr tablet    CANCELED: Lipid Panel      2. Thyroid nodule  US Soft Tissue Head Neck Thyroid    TSH      3. Class 2 severe obesity with serious comorbidity and body mass index (BMI) of 35.0 to 35.9 in adult, unspecified obesity type        4. 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 Continue Trulicity due to satiety effects which may help with weight loss.  A1c at goal without significant hypoglycemia  Check labs  Medication changes:     Dexcom reviewed: prolonged PP hyperglycemia after dinner and occ hypoglycemia overnight and before dinner. I believe half units of insulin will help her               Continue   Tresiba 24 units daily   For now, continue below.              Continue   Metformin 1000 mg twice daily  Trulicity 0.75 mg weekly    Continue                               Breakfast:            4 Units              Lunch:                  5 Units              Dinner:                 6-7 Units    Add 1.5 units with bedtime snack if you have it when you start inpen. For now, just give novolog 1-2 units for high carb bedtime snack.  Consult diabetes education for inpen  Start pravastatin 10 mg daily  Will likely change to half units of all novolog insulin above when she starts inpen.      -- Please try to give insulin at least 10 minutes before a meal.   -- Reviewed patient's current insulin regimen. Clarified proper insulin dose and timing in relation to meals, etc. Insulin injection sites and proper rotation instructed.     -- Advised frequent self blood glucose monitoring.  Patient encouraged to document glucose results and bring them to every clinic visit    -- Hypoglycemia precautions discussed. Instructed on precautions before driving.    -- Call for Bg repeatedly < 90 or > 180.   -- Close adherence to lifestyle changes recommended.   -- Periodic follow ups for eye evaluations, foot care and dental care suggested.  -- Given information on diabetic snacks and hypoglycemia previously  Cholesterol at goal without medications    Thyroid nodule  -- Small stable nodule on US in 2020  -- Denies compressive symptoms  -- Repeat thyroid US     Class 2 severe obesity with serious comorbidity in adult  Continue Trulicity as above  States she is losing weight since increasing exercise     Follow up in about 4 months (around 5/30/2023).  Labs prior to visit     I spent 22 minutes face-to-face with the patient, over half of the visit was spent on counseling and/or coordinating the care of the patient.    Counseling includes:  Diagnostic results, impressions, recommendations   Prognosis   Risk and benefits of management/treatment options   Instructions for management treatment and or follow-up   Importance of compliance with management   Risk factor reduction   Patient education

## 2023-02-02 DIAGNOSIS — E10.9 TYPE 1 DIABETES MELLITUS WITHOUT COMPLICATION: ICD-10-CM

## 2023-02-02 RX ORDER — DULAGLUTIDE 0.75 MG/.5ML
0.75 INJECTION, SOLUTION SUBCUTANEOUS
Qty: 4 PEN | Refills: 5 | Status: SHIPPED | OUTPATIENT
Start: 2023-02-02 | End: 2023-05-30

## 2023-02-03 ENCOUNTER — CLINICAL SUPPORT (OUTPATIENT)
Dept: DIABETES | Facility: CLINIC | Age: 63
End: 2023-02-03
Payer: COMMERCIAL

## 2023-02-03 DIAGNOSIS — E10.9 TYPE 1 DIABETES MELLITUS WITHOUT COMPLICATION: ICD-10-CM

## 2023-02-03 PROCEDURE — 99499 UNLISTED E&M SERVICE: CPT | Mod: S$GLB,,, | Performed by: DIETITIAN, REGISTERED

## 2023-02-03 PROCEDURE — 99999 PR PBB SHADOW E&M-EST. PATIENT-LVL I: ICD-10-PCS | Mod: PBBFAC,,, | Performed by: DIETITIAN, REGISTERED

## 2023-02-03 PROCEDURE — 99999 PR PBB SHADOW E&M-EST. PATIENT-LVL I: CPT | Mod: PBBFAC,,, | Performed by: DIETITIAN, REGISTERED

## 2023-02-03 PROCEDURE — 99499 NO LOS: ICD-10-PCS | Mod: S$GLB,,, | Performed by: DIETITIAN, REGISTERED

## 2023-02-03 NOTE — PROGRESS NOTES
Pt was here for InPen start, but the one she had  on . I sent a note to DAVID Sharma to send in a prescription for a new InPen. Pt will call to schedule a new appt when picks up her prescription.

## 2023-02-06 DIAGNOSIS — E10.9 TYPE 1 DIABETES MELLITUS WITHOUT COMPLICATION: Primary | ICD-10-CM

## 2023-02-06 RX ORDER — INSULIN PEN,REUSABLE,BT LISPRO
1 INSULIN PEN (EA) SUBCUTANEOUS ONCE
Qty: 1 EACH | Refills: 0 | Status: SHIPPED | OUTPATIENT
Start: 2023-02-06 | End: 2023-02-10 | Stop reason: SDUPTHER

## 2023-02-10 DIAGNOSIS — E10.9 TYPE 1 DIABETES MELLITUS WITHOUT COMPLICATION: ICD-10-CM

## 2023-02-10 RX ORDER — INSULIN PEN,REUSABLE,BT LISPRO
1 INSULIN PEN (EA) SUBCUTANEOUS ONCE
Qty: 1 EACH | Refills: 0 | Status: SHIPPED | OUTPATIENT
Start: 2023-02-10 | End: 2023-07-07 | Stop reason: SDUPTHER

## 2023-02-14 ENCOUNTER — TELEPHONE (OUTPATIENT)
Dept: ENDOCRINOLOGY | Facility: CLINIC | Age: 63
End: 2023-02-14
Payer: COMMERCIAL

## 2023-02-15 ENCOUNTER — TELEPHONE (OUTPATIENT)
Dept: ENDOCRINOLOGY | Facility: CLINIC | Age: 63
End: 2023-02-15
Payer: COMMERCIAL

## 2023-02-15 NOTE — TELEPHONE ENCOUNTER
Called patient informed her that the provider prefer for her to keep her appointment to have the ultrasound done at the [facility] hospital.

## 2023-03-10 ENCOUNTER — PATIENT MESSAGE (OUTPATIENT)
Dept: ADMINISTRATIVE | Facility: HOSPITAL | Age: 63
End: 2023-03-10
Payer: COMMERCIAL

## 2023-03-10 ENCOUNTER — PATIENT OUTREACH (OUTPATIENT)
Dept: ADMINISTRATIVE | Facility: HOSPITAL | Age: 63
End: 2023-03-10
Payer: COMMERCIAL

## 2023-03-10 NOTE — PROGRESS NOTES
Care Everywhere updates requested and reviewed.  Immunizations reconciled. Media reports reviewed.  Duplicate HM overrides and  orders removed.  Overdue HM topic chart audit and/or requested.  Overdue lab testing linked to upcoming lab appointments if applies.      Pap smear in     Health Maintenance Due   Topic Date Due    Hepatitis C Screening  Never done    Pneumococcal Vaccines (Age 0-64) (1 - PCV) Never done    HIV Screening  Never done    Mammogram  Never done    Colorectal Cancer Screening  Never done    Shingles Vaccine (1 of 2) Never done    COVID-19 Vaccine (3 - Booster for Pfizer series) 2021

## 2023-03-21 LAB
HPV MRNA E6/E7: NOT DETECTED
PAP RECOMMENDATION EXT: NORMAL

## 2023-03-24 ENCOUNTER — OFFICE VISIT (OUTPATIENT)
Dept: FAMILY MEDICINE | Facility: CLINIC | Age: 63
End: 2023-03-24
Payer: COMMERCIAL

## 2023-03-24 VITALS
DIASTOLIC BLOOD PRESSURE: 86 MMHG | OXYGEN SATURATION: 99 % | BODY MASS INDEX: 34.04 KG/M2 | TEMPERATURE: 98 F | SYSTOLIC BLOOD PRESSURE: 132 MMHG | WEIGHT: 173.38 LBS | HEIGHT: 60 IN | HEART RATE: 98 BPM

## 2023-03-24 DIAGNOSIS — Z11.59 ENCOUNTER FOR HEPATITIS C SCREENING TEST FOR LOW RISK PATIENT: ICD-10-CM

## 2023-03-24 DIAGNOSIS — Z11.4 ENCOUNTER FOR SCREENING FOR HIV: ICD-10-CM

## 2023-03-24 DIAGNOSIS — Z76.89 ENCOUNTER TO ESTABLISH CARE WITH NEW DOCTOR: Primary | ICD-10-CM

## 2023-03-24 DIAGNOSIS — E66.09 CLASS 1 OBESITY DUE TO EXCESS CALORIES WITH SERIOUS COMORBIDITY AND BODY MASS INDEX (BMI) OF 34.0 TO 34.9 IN ADULT: ICD-10-CM

## 2023-03-24 DIAGNOSIS — E10.3393 TYPE 1 DIABETES MELLITUS WITH MODERATE NONPROLIFERATIVE RETINOPATHY OF BOTH EYES WITHOUT MACULAR EDEMA: ICD-10-CM

## 2023-03-24 DIAGNOSIS — E04.1 THYROID NODULE: ICD-10-CM

## 2023-03-24 DIAGNOSIS — M54.16 LUMBAR RADICULOPATHY: ICD-10-CM

## 2023-03-24 DIAGNOSIS — Z00.00 WELLNESS EXAMINATION: ICD-10-CM

## 2023-03-24 DIAGNOSIS — G57.13 MERALGIA PARESTHETICA, BILATERAL LOWER LIMBS: ICD-10-CM

## 2023-03-24 PROBLEM — E66.811 CLASS 1 OBESITY DUE TO EXCESS CALORIES WITH SERIOUS COMORBIDITY AND BODY MASS INDEX (BMI) OF 34.0 TO 34.9 IN ADULT: Status: ACTIVE | Noted: 2020-08-10

## 2023-03-24 PROCEDURE — 3075F SYST BP GE 130 - 139MM HG: CPT | Mod: CPTII,S$GLB,, | Performed by: STUDENT IN AN ORGANIZED HEALTH CARE EDUCATION/TRAINING PROGRAM

## 2023-03-24 PROCEDURE — 99386 PR PREVENTIVE VISIT,NEW,40-64: ICD-10-PCS | Mod: S$GLB,,, | Performed by: STUDENT IN AN ORGANIZED HEALTH CARE EDUCATION/TRAINING PROGRAM

## 2023-03-24 PROCEDURE — 3075F PR MOST RECENT SYSTOLIC BLOOD PRESS GE 130-139MM HG: ICD-10-PCS | Mod: CPTII,S$GLB,, | Performed by: STUDENT IN AN ORGANIZED HEALTH CARE EDUCATION/TRAINING PROGRAM

## 2023-03-24 PROCEDURE — 3008F BODY MASS INDEX DOCD: CPT | Mod: CPTII,S$GLB,, | Performed by: STUDENT IN AN ORGANIZED HEALTH CARE EDUCATION/TRAINING PROGRAM

## 2023-03-24 PROCEDURE — 1160F PR REVIEW ALL MEDS BY PRESCRIBER/CLIN PHARMACIST DOCUMENTED: ICD-10-PCS | Mod: CPTII,S$GLB,, | Performed by: STUDENT IN AN ORGANIZED HEALTH CARE EDUCATION/TRAINING PROGRAM

## 2023-03-24 PROCEDURE — 3008F PR BODY MASS INDEX (BMI) DOCUMENTED: ICD-10-PCS | Mod: CPTII,S$GLB,, | Performed by: STUDENT IN AN ORGANIZED HEALTH CARE EDUCATION/TRAINING PROGRAM

## 2023-03-24 PROCEDURE — 3079F PR MOST RECENT DIASTOLIC BLOOD PRESSURE 80-89 MM HG: ICD-10-PCS | Mod: CPTII,S$GLB,, | Performed by: STUDENT IN AN ORGANIZED HEALTH CARE EDUCATION/TRAINING PROGRAM

## 2023-03-24 PROCEDURE — 3044F PR MOST RECENT HEMOGLOBIN A1C LEVEL <7.0%: ICD-10-PCS | Mod: CPTII,S$GLB,, | Performed by: STUDENT IN AN ORGANIZED HEALTH CARE EDUCATION/TRAINING PROGRAM

## 2023-03-24 PROCEDURE — 3044F HG A1C LEVEL LT 7.0%: CPT | Mod: CPTII,S$GLB,, | Performed by: STUDENT IN AN ORGANIZED HEALTH CARE EDUCATION/TRAINING PROGRAM

## 2023-03-24 PROCEDURE — 99386 PREV VISIT NEW AGE 40-64: CPT | Mod: S$GLB,,, | Performed by: STUDENT IN AN ORGANIZED HEALTH CARE EDUCATION/TRAINING PROGRAM

## 2023-03-24 PROCEDURE — 99999 PR PBB SHADOW E&M-EST. PATIENT-LVL V: ICD-10-PCS | Mod: PBBFAC,,, | Performed by: STUDENT IN AN ORGANIZED HEALTH CARE EDUCATION/TRAINING PROGRAM

## 2023-03-24 PROCEDURE — 99999 PR PBB SHADOW E&M-EST. PATIENT-LVL V: CPT | Mod: PBBFAC,,, | Performed by: STUDENT IN AN ORGANIZED HEALTH CARE EDUCATION/TRAINING PROGRAM

## 2023-03-24 PROCEDURE — 3079F DIAST BP 80-89 MM HG: CPT | Mod: CPTII,S$GLB,, | Performed by: STUDENT IN AN ORGANIZED HEALTH CARE EDUCATION/TRAINING PROGRAM

## 2023-03-24 PROCEDURE — 1160F RVW MEDS BY RX/DR IN RCRD: CPT | Mod: CPTII,S$GLB,, | Performed by: STUDENT IN AN ORGANIZED HEALTH CARE EDUCATION/TRAINING PROGRAM

## 2023-03-24 PROCEDURE — 1159F MED LIST DOCD IN RCRD: CPT | Mod: CPTII,S$GLB,, | Performed by: STUDENT IN AN ORGANIZED HEALTH CARE EDUCATION/TRAINING PROGRAM

## 2023-03-24 PROCEDURE — 1159F PR MEDICATION LIST DOCUMENTED IN MEDICAL RECORD: ICD-10-PCS | Mod: CPTII,S$GLB,, | Performed by: STUDENT IN AN ORGANIZED HEALTH CARE EDUCATION/TRAINING PROGRAM

## 2023-03-24 NOTE — PROGRESS NOTES
Subjective:       Patient ID: Kalpana Rivera is a 62 y.o. female.    Chief Complaint: Establish Care      Active Problem List with Overview Notes    Diagnosis Date Noted    Meralgia paresthetica, bilateral lower limbs 03/24/2023     Pain superficial on b/l in distribution lateral cutaneous nerve  MRI last year did show some stenosis L5/S1 as well she was offered injections but she declined  gabapentin does not really help symptoms  Went to PT for IT band and did find any of this helped  Notes some numbness and burning   Pain in skin and very painful with PT dry needling   She has not done NCS but is open to this   Lateral thigh; worse after prolonged sitting       Impaired mobility and activities of daily living 10/13/2021    Type 1 diabetes mellitus with 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   Neurological:  Positive for numbness. Negative for weakness.   All other systems reviewed and are negative.     A1C:  Recent Labs   Lab 03/28/22  0745 08/02/22  0756 01/30/23  0830   Hemoglobin A1C 6.9 H 7.1 H 6.8 H     CBC:      CMP:  Recent Labs   Lab 03/28/22  0745 08/02/22  0756 01/30/23  0830   Glucose 161 H 197 H 133 H   Calcium 8.9 8.8 9.6   Albumin 4.0 4.3 3.8   Total Protein 6.8 6.9 6.8   Sodium 140 139 141   Potassium 4.2 4.7 4.1   CO2 27 29 22 L   Chloride 104 99 106   BUN 6 L 5 L 9   Creatinine 0.52 0.54 0.6   Alkaline Phosphatase 66  72 77   ALT 22 32 25   AST 24 37 22   Total Bilirubin 0.3 0.6 0.3     LIPIDS:  Recent Labs   Lab 03/28/22  0745 01/30/23  0830   TSH  --  2.069   HDL 61  --    Cholesterol 134  --    Triglycerides 38  --    LDL Cholesterol 65.4  --    HDL/Cholesterol Ratio 45.5  --    Non-HDL Cholesterol 73  --    Total Cholesterol/HDL Ratio 2.2  --      TSH:  Recent Labs   Lab 09/14/20  0745 01/30/23  0830   TSH 1.80 2.069        Objective:      Vitals:    03/24/23 0849   BP: 132/86   Pulse: 98   Temp: 98.1 °F (36.7 °C)      Physical Exam  Vitals reviewed.   Constitutional:       Appearance: Normal appearance. She is obese.   HENT:      Head: Normocephalic and atraumatic.   Eyes:      Conjunctiva/sclera: Conjunctivae normal.   Cardiovascular:      Rate and Rhythm: Normal rate and regular rhythm.      Heart sounds: Normal heart sounds.   Pulmonary:      Effort: Pulmonary effort is normal.      Breath sounds: Normal breath sounds.   Abdominal:      Palpations: Abdomen is soft.      Tenderness: There is no abdominal tenderness.   Musculoskeletal:         General: Normal range of motion.      Cervical back: Normal range of motion.      Right lower leg: No edema.      Left lower leg: No edema.   Neurological:      General: No focal deficit present.      Mental Status: She is alert and oriented to person, place, and time.   Psychiatric:         Mood and Affect: Mood normal.         Behavior: Behavior normal.        Assessment:       1. Encounter to establish care with new doctor    2. Wellness examination    3. 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 Meralgia paresthetica, bilateral lower limbs    5. Lumbar radiculopathy    6. Thyroid nodule    7. Class 1 obesity due to excess calories with serious comorbidity and body mass index (BMI) of 34.0 to 34.9 in adult    8. Encounter for hepatitis C screening test for low risk patient    9. Encounter for screening for HIV           Plan:   1. Encounter to establish care with new doctor    2. Wellness examination  - CBC Without Differential; Standing  - Comprehensive Metabolic Panel; Standing  - Lipid Panel; Standing  - Hepatitis C Antibody; Future  - HIV 1/2 Ag/Ab (4th Gen); Future    3. 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 Meralgia paresthetica, bilateral lower limbs  - EMG W/ ULTRASOUND AND NERVE CONDUCTION TEST 2 Extremities; Future    5. Lumbar radiculopathy  - EMG W/ ULTRASOUND AND NERVE CONDUCTION TEST 2 Extremities; Future    6. Thyroid nodule    7. Class 1 obesity due to excess calories with serious comorbidity and body mass index (BMI) of 34.0 to 34.9 in adult  - CBC Without Differential; Standing  - Comprehensive Metabolic Panel; Standing  - Lipid Panel; Standing    8. Encounter for hepatitis C screening test for low risk patient  - Hepatitis C Antibody; Future    9. Encounter for screening for HIV  - HIV 1/2 Ag/Ab (4th Gen); Future     Establish care and Well female  Labs per orders   HM discussed  UTD; mammo OSH, colonoscopy OSH will need records   Continue healthy lifestyle efforts  Continue current meds as prescribed otherwise; refills per request  NCS for LE pain and numbness  Consider adding pitavastatin 1mg and losartan 25 for CVD prevention with diabetes   Keep routine specialist f/u   RTC in 3 months  and/or PRN          Sarah A. Champagne [facility]   3/24/23

## 2023-03-29 DIAGNOSIS — Z12.31 OTHER SCREENING MAMMOGRAM: ICD-10-CM

## 2023-03-30 ENCOUNTER — PATIENT MESSAGE (OUTPATIENT)
Dept: ADMINISTRATIVE | Facility: HOSPITAL | Age: 63
End: 2023-03-30
Payer: COMMERCIAL

## 2023-04-03 ENCOUNTER — PATIENT MESSAGE (OUTPATIENT)
Dept: ADMINISTRATIVE | Facility: HOSPITAL | Age: 63
End: 2023-04-03
Payer: COMMERCIAL

## 2023-04-04 LAB — BCS RECOMMENDATION EXT: NORMAL

## 2023-04-05 ENCOUNTER — HOSPITAL ENCOUNTER (OUTPATIENT)
Dept: ENDOCRINOLOGY | Facility: CLINIC | Age: 63
Discharge: HOME | End: 2023-04-05
Attending: NURSE PRACTITIONER
Payer: COMMERCIAL

## 2023-04-05 DIAGNOSIS — E04.1 THYROID NODULE: ICD-10-CM

## 2023-04-05 PROCEDURE — 76536 US SOFT TISSUE HEAD NECK THYROID: ICD-10-PCS | Mod: S$GLB,,, | Performed by: INTERNAL MEDICINE

## 2023-04-05 PROCEDURE — 76536 US EXAM OF HEAD AND NECK: CPT | Mod: S$GLB,,, | Performed by: INTERNAL MEDICINE

## 2023-04-05 NOTE — PROGRESS NOTES
FINDINGS:  THYROID GLAND has a heterogenous echotexture.  Vascularity is normal.        RIGHT LOBE     Right lobe measures: 4.9 x 1.5 x 1.7 cm.     1. 0.8 x 0.6 x 0.7 cm right middle lobe nodule. The nodule is solid with hypoechoic echotexture. Vascularity is Grade 2 (peripheral). Borders are well-defined with a thin surrounding halo. Microcalcifications are not present. On the previous ultrasound this nodule measured 0.6 x 0.6 x 0.7 cm.  Compared to the previous ultrasound, the nodule is unchanged in size and appearance.     ISTHMUS     Isthmus measures: 0.3 cm in AP dimension.        LEFT LOBE     Left lobe measures: 4.6 x 1.3 x 1.4 cm.        LYMPH NODES:     Real-time survey of the bilateral central and lateral neck was performed.  It did not reveal any abnormal appearing lymph nodes.     Impression:     1.  Thyroid gland is normal in size with homogenous echotexture.     2.  1 nodule in the right thyroid described above.  It does not meet criteria for fine-needle aspiration.     RECOMMENDATIONS:  Follow-up ultrasound in 2 years is recommended.    Message sent with above

## 2023-04-11 ENCOUNTER — PATIENT MESSAGE (OUTPATIENT)
Dept: ADMINISTRATIVE | Facility: HOSPITAL | Age: 63
End: 2023-04-11
Payer: COMMERCIAL

## 2023-04-11 ENCOUNTER — PATIENT OUTREACH (OUTPATIENT)
Dept: ADMINISTRATIVE | Facility: HOSPITAL | Age: 63
End: 2023-04-11
Payer: COMMERCIAL

## 2023-04-15 ENCOUNTER — OFFICE VISIT (OUTPATIENT)
Dept: URGENT CARE | Facility: CLINIC | Age: 63
End: 2023-04-15
Payer: COMMERCIAL

## 2023-04-15 VITALS
HEART RATE: 99 BPM | SYSTOLIC BLOOD PRESSURE: 141 MMHG | RESPIRATION RATE: 19 BRPM | DIASTOLIC BLOOD PRESSURE: 85 MMHG | OXYGEN SATURATION: 96 % | TEMPERATURE: 99 F | BODY MASS INDEX: 34.88 KG/M2 | HEIGHT: 59 IN | WEIGHT: 173 LBS

## 2023-04-15 DIAGNOSIS — S20.212A CONTUSION OF RIBS, LEFT, INITIAL ENCOUNTER: ICD-10-CM

## 2023-04-15 DIAGNOSIS — S52.591A OTHER CLOSED FRACTURE OF DISTAL END OF RIGHT RADIUS, INITIAL ENCOUNTER: Primary | ICD-10-CM

## 2023-04-15 PROCEDURE — 73110 X-RAY EXAM OF WRIST: CPT | Mod: FY,RT,S$GLB, | Performed by: INTERNAL MEDICINE

## 2023-04-15 PROCEDURE — 71100 X-RAY EXAM RIBS UNI 2 VIEWS: CPT | Mod: FY,LT,S$GLB, | Performed by: RADIOLOGY

## 2023-04-15 PROCEDURE — 73110 XR WRIST COMPLETE 3 VIEWS RIGHT: ICD-10-PCS | Mod: FY,RT,S$GLB, | Performed by: INTERNAL MEDICINE

## 2023-04-15 PROCEDURE — 99204 OFFICE O/P NEW MOD 45 MIN: CPT | Mod: S$GLB,,,

## 2023-04-15 PROCEDURE — 99204 PR OFFICE/OUTPT VISIT, NEW, LEVL IV, 45-59 MIN: ICD-10-PCS | Mod: S$GLB,,,

## 2023-04-15 PROCEDURE — 71100 XR RIBS 2 VIEW LEFT: ICD-10-PCS | Mod: FY,LT,S$GLB, | Performed by: RADIOLOGY

## 2023-04-15 RX ORDER — FLUTICASONE PROPIONATE 50 MCG
1 SPRAY, SUSPENSION (ML) NASAL
COMMUNITY
End: 2023-07-07 | Stop reason: SDUPTHER

## 2023-04-15 RX ORDER — INSULIN DEGLUDEC 200 U/ML
INJECTION, SOLUTION SUBCUTANEOUS
COMMUNITY
Start: 2023-03-04 | End: 2023-11-02

## 2023-04-15 RX ORDER — GABAPENTIN 100 MG/1
100 CAPSULE ORAL 3 TIMES DAILY
COMMUNITY
Start: 2023-01-27 | End: 2023-07-07 | Stop reason: SDUPTHER

## 2023-04-15 RX ORDER — TRAMADOL HYDROCHLORIDE 50 MG/1
50 TABLET ORAL EVERY 8 HOURS PRN
Qty: 12 EACH | Refills: 0 | Status: SHIPPED | OUTPATIENT
Start: 2023-04-15 | End: 2023-07-07 | Stop reason: SDUPTHER

## 2023-04-15 NOTE — PATIENT INSTRUCTIONS
PLEASE READ YOUR DISCHARGE INSTRUCTIONS ENTIRELY AS IT CONTAINS IMPORTANT INFORMATION.    Tylenol and ibuprofen for pain    Please only take the narcotic pain medication as needed for severe pain - break in half first to see how it affects you. Do not drive or operate machinery after. Drink plenty of fluids as it can make you constipated.     If a splint was placed please do not remove until you see the orthopedic doctor. Do not get it wet.     Rest, elevate your injury at home    Please arrange follow up with your primary medical clinic as soon as possible. You must understand that you've received an Urgent Care treatment only and that you may be released before all of your medical problems are known or treated. You, the patient, will arrange for follow up as instructed. If your symptoms worsen or fail to improve you should go to the Emergency Room.      Chest Wall Pain   Please go to the Emergency Department for any concerns or worsening of condition such as:  Shortness of breath, difficulty breathing, or breathing fast  Chest pain gets worse when you breathe  Severe pain that comes on suddenly or lasts more than an hour  Dizziness, weakness, or fainting  Fever   Follow up with your PCP in the next 2-3 days for no improvement in symptoms.    Avoid any heavy lifting, pushing heavy objects or weight training during this time of present symptoms.  Avoid any strenuous activity that causes aggravation to the affected area.  Cool compresses to affected area are helpful.  Can use a pillow to brace area when sneezing or coughing.   If you were prescribed a narcotic medication, do not drive or operate heavy equipment or machinery while taking these medications.  Please follow up with your primary care doctor or specialist in the next 48-72hrs as needed.    If you  smoke, please stop smoking.

## 2023-04-15 NOTE — PROGRESS NOTES
"Subjective:      Patient ID: Kalpana Rivera is a 62 y.o. female.    Vitals:  height is 4' 11" (1.499 m) and weight is 78.5 kg (173 lb). Her temperature is 98.5 °F (36.9 °C). Her blood pressure is 141/85 (abnormal) and her pulse is 99. Her respiration is 19 and oxygen saturation is 96%.     Chief Complaint: Fall (Right wrist pain left ribs pain)    61yo female pt presents to the clinic with trauma to her right wrist and left side from a fall. Pt stated she slipped while she was at home last night and hit the wood floor. Pt reports that her  used something new to clean with and her floors were slippery. Pt denies any numbness/tingling, chest pain, palpitations, or shortness of breath.    Wrist Injury   The incident occurred 12 to 24 hours ago. The incident occurred at home. The injury mechanism was a fall. The pain is present in the left wrist. The quality of the pain is described as aching. The pain does not radiate. The pain is at a severity of 9/10. The pain is severe. The pain has been Constant since the incident. Pertinent negatives include no numbness or tingling. The symptoms are aggravated by movement and lifting. She has tried ice for the symptoms. The treatment provided no relief.   Flank Pain  This is a new problem. The current episode started yesterday. The problem occurs intermittently. The problem has been gradually worsening since onset. The quality of the pain is described as aching. The pain does not radiate. The pain is at a severity of 5/10. The pain is mild. The symptoms are aggravated by twisting and coughing (Deep Breathe Hurt her left flank). Pertinent negatives include no fever, headaches, numbness or tingling. She has tried nothing for the symptoms.     Constitution: Negative for chills and fever.   Genitourinary:  Negative for flank pain.   Neurological:  Negative for headaches and numbness.    Objective:     Physical Exam   Constitutional: She is oriented to person, place, and " time. She appears well-developed. She is cooperative.   HENT:   Head: Normocephalic and atraumatic.   Ears:   Right Ear: Hearing normal.   Left Ear: Hearing normal.   Nose: Left sinus exhibits no maxillary sinus tenderness.   Mouth/Throat: Uvula is midline, oropharynx is clear and moist and mucous membranes are normal.   Eyes: Conjunctivae and lids are normal.   Neck: Trachea normal and phonation normal. Neck supple.   Cardiovascular: Normal rate, regular rhythm, normal heart sounds and normal pulses.   Pulmonary/Chest: Effort normal and breath sounds normal.   Abdominal: Normal appearance and bowel sounds are normal. Soft.   Musculoskeletal: Normal range of motion.         General: Normal range of motion.   Neurological: She is alert and oriented to person, place, and time. She exhibits normal muscle tone.   Skin: Skin is warm, dry and intact.   Psychiatric: Her speech is normal and behavior is normal. Judgment and thought content normal.   Nursing note and vitals reviewed.  X-Ray Ribs 2 View Left    Result Date: 4/15/2023  EXAMINATION: XR RIBS 2 VIEW LEFT CLINICAL HISTORY: Injury, unspecified, initial encounter TECHNIQUE: Two views of the left ribs were performed. Single frontal view of the chest was obtained. COMPARISON: None. FINDINGS: Chest: The trachea is unremarkable.  There are calcifications of the aortic knob.  The cardiomediastinal silhouette is within normal limits.  The hemidiaphragms are unremarkable.  There are no pleural effusions.  There is no evidence of a pneumothorax.  There is no evidence of pneumomediastinum.  No airspace opacity is present. Left-sided ribs: The bone mineralization is within normal limits.  There is no evidence of a displaced left-sided rib fracture.  There are degenerative changes in the osseous structures.     No acute process. Electronically signed by: Bautista Rothman MD Date:    04/15/2023 Time:    15:37    XR WRIST COMPLETE 3 VIEWS RIGHT    Result Date:  4/15/2023  EXAMINATION: XR WRIST COMPLETE 3 VIEWS RIGHT CLINICAL HISTORY: Injury, unspecified, initial encounter TECHNIQUE: PA, lateral, and oblique views of the right wrist were performed. COMPARISON: None FINDINGS: Cortical step-off involving the dorsal aspect of the distal radial metaphysis, seen only on the lateral radiograph, concerning for a minimally displaced fracture.  Possible dorsal wrist soft tissue swelling. No other fractures are identified.  No dislocation.  Degenerative changes, including the triscaphe and thumb CMC joints.     Probable distal radius fracture.  Correlate for focal pain in this region. This report was flagged in Epic as abnormal. Electronically signed by: Sheldon Herrera Date:    04/15/2023 Time:    15:20       11:53       Patient in no acute distress.  Vitals reassuring.  Discussed results/diagnosis/plan in depth with patient in clinic. Strict precautions given to patient to monitor for worsening signs and symptoms. Advised to follow up with primary.All questions answered. Strict ER precautions given. If your symptoms worsens or fail to improve you should go to the Emergency Room. Discharge and follow-up instructions given verbally/printed. Discharge and follow-up instructions discussed with the patient who expressed understanding and willingness to comply with my recommendations.Patient voiced understanding and in agreement with current treatment plan.     Please be advised this text was dictated with makeena software and may contain errors due to translation.    Assessment:     1. Other closed fracture of distal end of right radius, initial encounter        Plan:       Other closed fracture of distal end of right radius, initial encounter  -     XR WRIST COMPLETE 3 VIEWS RIGHT; Future; Expected date: 04/15/2023  -     X-Ray Ribs 2 View Left; Future; Expected date: 04/15/2023  -     Ambulatory referral/consult to Orthopedics  -     SPLINT FOR HOME USE    Contusion of ribs, left, initial  encounter    Other orders  -     traMADoL (ULTRAM) 50 mg tablet; Take 1 tablet (50 mg total) by mouth every 8 (eight) hours as needed for Pain.  Dispense: 12 each; Refill: 0      Pt in no acute distress. Vitals reassuring. Reviewed xray results of ribs and wrist with pt. No rib fractures noted. Probable distal radial fracture. Sugar tong splint and sling applied. Urgent referral to ortho placed. Clinic  called and v/m left for appt for Monday. Discussed close f/u with ortho and strict ER precautions were given. Discussed pain management with Tylenol/Motrin and Tramadol as needed.  PDMP reviewed.  Discussed the importance of further evaluation if symptoms worsen. Patient stated verbal understanding.    Patient Instructions   PLEASE READ YOUR DISCHARGE INSTRUCTIONS ENTIRELY AS IT CONTAINS IMPORTANT INFORMATION.    Tylenol and ibuprofen for pain    Please only take the narcotic pain medication as needed for severe pain - break in half first to see how it affects you. Do not drive or operate machinery after. Drink plenty of fluids as it can make you constipated.     If a splint was placed please do not remove until you see the orthopedic doctor. Do not get it wet.     Rest, elevate your injury at home    Please arrange follow up with your primary medical clinic as soon as possible. You must understand that you've received an Urgent Care treatment only and that you may be released before all of your medical problems are known or treated. You, the patient, will arrange for follow up as instructed. If your symptoms worsen or fail to improve you should go to the Emergency Room.      Chest Wall Pain   Please go to the Emergency Department for any concerns or worsening of condition such as:  Shortness of breath, difficulty breathing, or breathing fast  Chest pain gets worse when you breathe  Severe pain that comes on suddenly or lasts more than an hour  Dizziness, weakness, or fainting  Fever   Follow up with your  PCP in the next 2-3 days for no improvement in symptoms.    Avoid any heavy lifting, pushing heavy objects or weight training during this time of present symptoms.  Avoid any strenuous activity that causes aggravation to the affected area.  Cool compresses to affected area are helpful.  Can use a pillow to brace area when sneezing or coughing.   If you were prescribed a narcotic medication, do not drive or operate heavy equipment or machinery while taking these medications.  Please follow up with your primary care doctor or specialist in the next 48-72hrs as needed.    If you  smoke, please stop smoking.

## 2023-04-17 ENCOUNTER — TELEPHONE (OUTPATIENT)
Dept: ORTHOPEDICS | Facility: CLINIC | Age: 63
End: 2023-04-17
Payer: COMMERCIAL

## 2023-04-17 ENCOUNTER — OFFICE VISIT (OUTPATIENT)
Dept: ORTHOPEDICS | Facility: CLINIC | Age: 63
End: 2023-04-17
Payer: COMMERCIAL

## 2023-04-17 VITALS — BODY MASS INDEX: 34.89 KG/M2 | HEIGHT: 59 IN | WEIGHT: 173.06 LBS

## 2023-04-17 DIAGNOSIS — S52.501A CLOSED FRACTURE OF DISTAL END OF RIGHT RADIUS, UNSPECIFIED FRACTURE MORPHOLOGY, INITIAL ENCOUNTER: Primary | ICD-10-CM

## 2023-04-17 PROCEDURE — 1159F MED LIST DOCD IN RCRD: CPT | Mod: CPTII,S$GLB,, | Performed by: ORTHOPAEDIC SURGERY

## 2023-04-17 PROCEDURE — 3008F BODY MASS INDEX DOCD: CPT | Mod: CPTII,S$GLB,, | Performed by: ORTHOPAEDIC SURGERY

## 2023-04-17 PROCEDURE — 99204 OFFICE O/P NEW MOD 45 MIN: CPT | Mod: S$GLB,,, | Performed by: ORTHOPAEDIC SURGERY

## 2023-04-17 PROCEDURE — 99204 PR OFFICE/OUTPT VISIT, NEW, LEVL IV, 45-59 MIN: ICD-10-PCS | Mod: S$GLB,,, | Performed by: ORTHOPAEDIC SURGERY

## 2023-04-17 PROCEDURE — 3044F HG A1C LEVEL LT 7.0%: CPT | Mod: CPTII,S$GLB,, | Performed by: ORTHOPAEDIC SURGERY

## 2023-04-17 PROCEDURE — 99999 PR PBB SHADOW E&M-EST. PATIENT-LVL IV: ICD-10-PCS | Mod: PBBFAC,,, | Performed by: ORTHOPAEDIC SURGERY

## 2023-04-17 PROCEDURE — 1159F PR MEDICATION LIST DOCUMENTED IN MEDICAL RECORD: ICD-10-PCS | Mod: CPTII,S$GLB,, | Performed by: ORTHOPAEDIC SURGERY

## 2023-04-17 PROCEDURE — 3008F PR BODY MASS INDEX (BMI) DOCUMENTED: ICD-10-PCS | Mod: CPTII,S$GLB,, | Performed by: ORTHOPAEDIC SURGERY

## 2023-04-17 PROCEDURE — 99999 PR PBB SHADOW E&M-EST. PATIENT-LVL IV: CPT | Mod: PBBFAC,,, | Performed by: ORTHOPAEDIC SURGERY

## 2023-04-17 PROCEDURE — 3044F PR MOST RECENT HEMOGLOBIN A1C LEVEL <7.0%: ICD-10-PCS | Mod: CPTII,S$GLB,, | Performed by: ORTHOPAEDIC SURGERY

## 2023-04-17 NOTE — TELEPHONE ENCOUNTER
Spoke c pt. Pt refused first available appointment 04/18/23 for UC  f/u distal radius fx. Pt wants to only be seen in [facility] or [place].     Pt stated that her daughter is a RN and noted that the splint from the UC was removed due to the discomfort and feeling like her circulation was being cut off - pt feels that she needs to be seen today since she is in an ace wrap only at her preferred locations only.     Advised pt that her information would be sent to those locations and assist her with scheduling.

## 2023-04-17 NOTE — PROGRESS NOTES
"Subjective:      Patient ID: Kalpana Rivera is a 62 y.o. female.    Chief Complaint: Consult and Wrist Injury (right )      HPI: Kalpana Rivera is here in follow-up of urgent care visit.  Patient sustained a fall 3 days ago in her home.  She was seen in the urgent care 2 days ago which time x-rays were significant for possible nondisplaced distal radius fracture.  She was splinted and given tramadol (has not picked it up yet).  Patient reports she removed the splint secondary to discomfort. Pain is controlled with Mortin tid. She is right hand dominant.    Past Medical History:   Diagnosis Date    Diabetes mellitus, type 2        Current Outpatient Medications:     b complex vitamins tablet, Take 1 tablet by mouth once daily., Disp: , Rfl:     blood-glucose sensor (DEXCOM G6 SENSOR) Brittany, 1 Device by Misc.(Non-Drug; Combo Route) route every 10 days., Disp: 3 each, Rfl: 11    blood-glucose transmitter (DEXCOM G6 TRANSMITTER) Brittany, 2 Devices by Misc.(Non-Drug; Combo Route) route every 6 (six) months., Disp: 1 each, Rfl: 3    cetirizine (ZYRTEC) 10 MG tablet, Take 10 mg by mouth once daily., Disp: , Rfl:     dulaglutide (TRULICITY) 0.75 mg/0.5 mL pen injector, Inject 0.75 mg into the skin every 7 days., Disp: 4 pen, Rfl: 5    insulin aspart U-100 (NOVOLOG FLEXPEN U-100 INSULIN) 100 unit/mL (3 mL) InPn pen, Inject 3 units with breakfast, 5 units with lunch and 5 units with dinner. Inject 1-2 units with snacks., Disp: 45 mL, Rfl: 1    Lactobacillus rhamnosus GG (CULTURELLE) 10 billion cell capsule, Take 1 capsule by mouth once daily., Disp: , Rfl:     metFORMIN (GLUCOPHAGE-XR) 500 MG ER 24hr tablet, Take 2 tablets (1,000 mg total) by mouth 2 (two) times daily with meals., Disp: 360 tablet, Rfl: 1    multivitamin with minerals tablet, Take 1 tablet by mouth once daily., Disp: , Rfl:     pen needle, diabetic (BD ULTRA-FINE SHORT PEN NEEDLE) 31 gauge x 5/16" Ndle, Use to inject insulin 4x daily, Disp: 400 each, Rfl: " "1    TRESIBA FLEXTOUCH U-200 200 unit/mL (3 mL) insulin pen, , Disp: , Rfl:     TURMERIC ORAL, Take by mouth., Disp: , Rfl:     UNABLE TO FIND, Cranberry & Buchu natural supplement, Disp: , Rfl:     UNABLE TO FIND, Nerve Eight natural supplement, Disp: , Rfl:     UNABLE TO FIND, [place] natural supplement, Disp: , Rfl:     VITAMIN B COMPLEX ORAL, Take 1 tablet by mouth., Disp: , Rfl:     fluticasone propionate (FLONASE) 50 mcg/actuation nasal spray, 1 spray by Each Nostril route once daily., Disp: , Rfl:     fluticasone propionate (FLONASE) 50 mcg/actuation nasal spray, 1 spray by Nasal route., Disp: , Rfl:     gabapentin (NEURONTIN) 100 MG capsule, Take 100 mg by mouth 3 (three) times daily., Disp: , Rfl:     gabapentin (NEURONTIN) 300 MG capsule, Take 1 capsule (300 mg total) by mouth 2 (two) times daily. (Patient not taking: Reported on 4/15/2023), Disp: 60 capsule, Rfl: 11    INPEN, NOVOLOG OR FIASP, PINK InPn, Inject 1 each into the skin once. for 1 dose, Disp: 1 each, Rfl: 0    traMADoL (ULTRAM) 50 mg tablet, Take 1 tablet (50 mg total) by mouth every 8 (eight) hours as needed for Pain. (Patient not taking: Reported on 4/17/2023), Disp: 12 each, Rfl: 0  Review of patient's allergies indicates:   Allergen Reactions    Azithromycin Anaphylaxis       Ht 4' 11" (1.499 m)   Wt 78.5 kg (173 lb 1 oz)   BMI 34.95 kg/m²     Review of Systems   Constitutional: Negative for chills and fever.   Cardiovascular:  Negative for chest pain and palpitations.   Respiratory:  Negative for shortness of breath and wheezing.    Skin:  Negative for poor wound healing and rash.   Musculoskeletal:  Positive for joint pain and joint swelling.   Gastrointestinal:  Negative for nausea and vomiting.   Genitourinary:  Negative for dysuria and hematuria.   Neurological:  Negative for seizures and tremors.   Psychiatric/Behavioral:  Negative for altered mental status.    Allergic/Immunologic: Negative for environmental allergies and " persistent infections.       Objective:    Ortho Exam   GEN: Well developed, well nourished female. AAOX3. No acute distress.   Normocephalic, atraumatic.   FADI  Breathing unlabored.  Mood and affect appropriate.     Right UE: Skin intact. Swelling significant dorsal hand swelling where the splint.. TTP mild over the distal radius. ROM fingers full, wrist ROM was not tested. Sensation intact. Tinels negative. Pulses present. Cap refill brisk.  strength decreased. Special tests:  None      Assessment:     Imaging:  I have personally reviewed and interpreted the radiographs. Xray of the right hand/wrist shows cortical step-off on the lateral view of the distal radius consistent with nondisplaced fracture.  The remainder of the radius appears in anatomic alignment.          1. Closed fracture of distal end of right radius, unspecified fracture morphology, initial encounter          Plan:          I explained the nature of the injury and the natural progression of recovery  I recommended full-time immobilization with a removable Velcro splint.  She may have this off for bathing, showering only  No heavy lifting, gripping, pushing pulling  Pain control with tramadol and or over-the-counter analgesia as needed    Follow up in about 2 weeks (around 5/1/2023) for Repeat x-rays/fracture care.

## 2023-04-19 ENCOUNTER — TELEPHONE (OUTPATIENT)
Dept: FAMILY MEDICINE | Facility: CLINIC | Age: 63
End: 2023-04-19
Payer: COMMERCIAL

## 2023-04-19 NOTE — TELEPHONE ENCOUNTER
----- Message from Carolynn Blanchard sent at 4/19/2023  1:50 PM CDT -----  [facility] returning call to office       [phone] ext [phone] [facility]  ref# [id number]

## 2023-04-19 NOTE — TELEPHONE ENCOUNTER
----- Message from Salvador Brewer sent at 4/19/2023  1:14 PM CDT -----  Pt Requesting Call Back    Who called: Trisha of [facility]   Who called for pt:  Best call back #:  [phone] ref# [id number]  Add notes: Trisha said she needs pt's diagnosis code/CPT code to see if prior authorization is needed for pt

## 2023-04-25 DIAGNOSIS — S52.501A CLOSED FRACTURE OF DISTAL END OF RIGHT RADIUS, UNSPECIFIED FRACTURE MORPHOLOGY, INITIAL ENCOUNTER: Primary | ICD-10-CM

## 2023-05-01 ENCOUNTER — HOSPITAL ENCOUNTER (OUTPATIENT)
Dept: RADIOLOGY | Facility: HOSPITAL | Age: 63
Discharge: HOME | End: 2023-05-01
Attending: ORTHOPAEDIC SURGERY
Payer: COMMERCIAL

## 2023-05-01 ENCOUNTER — OFFICE VISIT (OUTPATIENT)
Dept: ORTHOPEDICS | Facility: CLINIC | Age: 63
End: 2023-05-01
Payer: COMMERCIAL

## 2023-05-01 VITALS — HEIGHT: 59 IN | BODY MASS INDEX: 34.89 KG/M2 | WEIGHT: 173.06 LBS

## 2023-05-01 DIAGNOSIS — S52.501A CLOSED FRACTURE OF DISTAL END OF RIGHT RADIUS, UNSPECIFIED FRACTURE MORPHOLOGY, INITIAL ENCOUNTER: ICD-10-CM

## 2023-05-01 DIAGNOSIS — S52.501D CLOSED FRACTURE OF DISTAL END OF RIGHT RADIUS WITH ROUTINE HEALING, UNSPECIFIED FRACTURE MORPHOLOGY, SUBSEQUENT ENCOUNTER: Primary | ICD-10-CM

## 2023-05-01 PROCEDURE — 3044F PR MOST RECENT HEMOGLOBIN A1C LEVEL <7.0%: ICD-10-PCS | Mod: CPTII,S$GLB,, | Performed by: ORTHOPAEDIC SURGERY

## 2023-05-01 PROCEDURE — 1159F MED LIST DOCD IN RCRD: CPT | Mod: CPTII,S$GLB,, | Performed by: ORTHOPAEDIC SURGERY

## 2023-05-01 PROCEDURE — 99999 PR PBB SHADOW E&M-EST. PATIENT-LVL IV: CPT | Mod: PBBFAC,,, | Performed by: ORTHOPAEDIC SURGERY

## 2023-05-01 PROCEDURE — 99999 PR PBB SHADOW E&M-EST. PATIENT-LVL IV: ICD-10-PCS | Mod: PBBFAC,,, | Performed by: ORTHOPAEDIC SURGERY

## 2023-05-01 PROCEDURE — 73110 X-RAY EXAM OF WRIST: CPT | Mod: TC,PN,RT

## 2023-05-01 PROCEDURE — 73110 XR WRIST COMPLETE 3 VIEWS RIGHT: ICD-10-PCS | Mod: 26,RT,, | Performed by: RADIOLOGY

## 2023-05-01 PROCEDURE — 3008F BODY MASS INDEX DOCD: CPT | Mod: CPTII,S$GLB,, | Performed by: ORTHOPAEDIC SURGERY

## 2023-05-01 PROCEDURE — 99213 PR OFFICE/OUTPT VISIT, EST, LEVL III, 20-29 MIN: ICD-10-PCS | Mod: S$GLB,,, | Performed by: ORTHOPAEDIC SURGERY

## 2023-05-01 PROCEDURE — 3008F PR BODY MASS INDEX (BMI) DOCUMENTED: ICD-10-PCS | Mod: CPTII,S$GLB,, | Performed by: ORTHOPAEDIC SURGERY

## 2023-05-01 PROCEDURE — 3044F HG A1C LEVEL LT 7.0%: CPT | Mod: CPTII,S$GLB,, | Performed by: ORTHOPAEDIC SURGERY

## 2023-05-01 PROCEDURE — 99213 OFFICE O/P EST LOW 20 MIN: CPT | Mod: S$GLB,,, | Performed by: ORTHOPAEDIC SURGERY

## 2023-05-01 PROCEDURE — 1159F PR MEDICATION LIST DOCUMENTED IN MEDICAL RECORD: ICD-10-PCS | Mod: CPTII,S$GLB,, | Performed by: ORTHOPAEDIC SURGERY

## 2023-05-01 PROCEDURE — 73110 X-RAY EXAM OF WRIST: CPT | Mod: 26,RT,, | Performed by: RADIOLOGY

## 2023-05-01 NOTE — PROGRESS NOTES
"Subjective:      Patient ID: Kalpana Rivera is a 62 y.o. female.    Chief Complaint: Wrist Injury (right ) and Follow-up      HPI: Kalpana Rivera is here in follow-up of non-displaced right distal radius fracture. Now about 2.5 weeks out form injury. She was last seen an treated 2 weeks ago with full time immobilization using removable splint. Today, reports minimal to no pain. She is not using any analgesia. She does find the brace somewhat uncomfortable.     Past Medical History:   Diagnosis Date    Diabetes mellitus, type 2        Current Outpatient Medications:     b complex vitamins tablet, Take 1 tablet by mouth once daily., Disp: , Rfl:     blood-glucose sensor (DEXCOM G6 SENSOR) Brittany, 1 Device by Misc.(Non-Drug; Combo Route) route every 10 days., Disp: 3 each, Rfl: 11    blood-glucose transmitter (DEXCOM G6 TRANSMITTER) Brittany, 2 Devices by Misc.(Non-Drug; Combo Route) route every 6 (six) months., Disp: 1 each, Rfl: 3    cetirizine (ZYRTEC) 10 MG tablet, Take 10 mg by mouth once daily., Disp: , Rfl:     dulaglutide (TRULICITY) 0.75 mg/0.5 mL pen injector, Inject 0.75 mg into the skin every 7 days., Disp: 4 pen, Rfl: 5    gabapentin (NEURONTIN) 100 MG capsule, Take 100 mg by mouth 3 (three) times daily., Disp: , Rfl:     insulin aspart U-100 (NOVOLOG FLEXPEN U-100 INSULIN) 100 unit/mL (3 mL) InPn pen, Inject 3 units with breakfast, 5 units with lunch and 5 units with dinner. Inject 1-2 units with snacks., Disp: 45 mL, Rfl: 1    Lactobacillus rhamnosus GG (CULTURELLE) 10 billion cell capsule, Take 1 capsule by mouth once daily., Disp: , Rfl:     metFORMIN (GLUCOPHAGE-XR) 500 MG ER 24hr tablet, Take 2 tablets (1,000 mg total) by mouth 2 (two) times daily with meals., Disp: 360 tablet, Rfl: 1    multivitamin with minerals tablet, Take 1 tablet by mouth once daily., Disp: , Rfl:     pen needle, diabetic (BD ULTRA-FINE SHORT PEN NEEDLE) 31 gauge x 5/16" Ndle, Use to inject insulin 4x daily, Disp: 400 each, " "Rfl: 1    TRESIBA FLEXTOUCH U-200 200 unit/mL (3 mL) insulin pen, , Disp: , Rfl:     TURMERIC ORAL, Take by mouth., Disp: , Rfl:     UNABLE TO FIND, Cranberry & Buchu natural supplement, Disp: , Rfl:     UNABLE TO FIND, Nerve Eight natural supplement, Disp: , Rfl:     UNABLE TO FIND, [place] natural supplement, Disp: , Rfl:     VITAMIN B COMPLEX ORAL, Take 1 tablet by mouth., Disp: , Rfl:     fluticasone propionate (FLONASE) 50 mcg/actuation nasal spray, 1 spray by Each Nostril route once daily., Disp: , Rfl:     fluticasone propionate (FLONASE) 50 mcg/actuation nasal spray, 1 spray by Nasal route., Disp: , Rfl:     gabapentin (NEURONTIN) 300 MG capsule, Take 1 capsule (300 mg total) by mouth 2 (two) times daily. (Patient not taking: Reported on 4/15/2023), Disp: 60 capsule, Rfl: 11    INPEN, NOVOLOG OR FIASP, PINK InPn, Inject 1 each into the skin once. for 1 dose, Disp: 1 each, Rfl: 0    traMADoL (ULTRAM) 50 mg tablet, Take 1 tablet (50 mg total) by mouth every 8 (eight) hours as needed for Pain. (Patient not taking: Reported on 4/17/2023), Disp: 12 each, Rfl: 0  Review of patient's allergies indicates:   Allergen Reactions    Azithromycin Anaphylaxis       Ht 4' 11" (1.499 m)   Wt 78.5 kg (173 lb 1 oz)   BMI 34.95 kg/m²     ROS      Objective:    Ortho Exam       GEN: Well developed, well nourished female. AAOX3. No acute distress.   Normocephalic, atraumatic.   FADI  Breathing unlabored.  Mood and affect appropriate.   Right UE: Skin intact no bruising. Swelling significantly improved. TTP none, even over the fracture site. ROM wrist stiff, but full flexion, limited extension. ROM fingers full. Sensation intact. Tinels na. Pulses present Cap refill brisk..  strength slightly decreased. Special tests: none    Assessment:     Imaging:  I have personally reviewed and interpreted the radiographs. Xray of the right wrist shows previous step off, no longer visible.           1. Closed fracture of distal end of " right radius with routine healing, unspecified fracture morphology, subsequent encounter          Plan:          Pt has no pain to the fracture site and good/ painless ROM  Xrays with improvement   Recommend she start weaning out of the brace for light activity, continue to wear when leaving the house or any lifting.   Gentle ROM and appropriate exercises demonstrated and performed.     Follow up in about 3 weeks (around 5/22/2023) for xrays\.

## 2023-05-22 ENCOUNTER — TELEPHONE (OUTPATIENT)
Dept: NEUROLOGY | Facility: CLINIC | Age: 63
End: 2023-05-22
Payer: COMMERCIAL

## 2023-05-22 NOTE — TELEPHONE ENCOUNTER
----- Message from Lacey Mcwilliams sent at 5/22/2023  2:37 PM CDT -----  Contact: pt  Type:  Patient Returning Call    Who Called:pt  Who Left Message for Patient:Sarah   Does the patient know what this is regarding?:  Would the patient rather a call back or a response via [facility]? Call   Best Call Back Number:[phone]  Additional Information:     Pt stated she has some questions regarding her procedure

## 2023-05-22 NOTE — TELEPHONE ENCOUNTER
Called and spoke to patient regarding message.Patient is concerned about the EMG do to past pain with dry needling. I stated I will send Dr. Rodriguez and Godfrey a message and when either or both of them respond back I will call her. Pt voiced understanding.

## 2023-05-26 ENCOUNTER — HOSPITAL ENCOUNTER (OUTPATIENT)
Dept: RADIOLOGY | Facility: HOSPITAL | Age: 63
Discharge: HOME | End: 2023-05-26
Attending: ORTHOPAEDIC SURGERY
Payer: COMMERCIAL

## 2023-05-26 ENCOUNTER — OFFICE VISIT (OUTPATIENT)
Dept: ORTHOPEDICS | Facility: CLINIC | Age: 63
End: 2023-05-26
Payer: COMMERCIAL

## 2023-05-26 VITALS — HEIGHT: 59 IN | BODY MASS INDEX: 34.27 KG/M2 | WEIGHT: 170 LBS

## 2023-05-26 DIAGNOSIS — S52.501D CLOSED FRACTURE OF DISTAL END OF RIGHT RADIUS WITH ROUTINE HEALING, UNSPECIFIED FRACTURE MORPHOLOGY, SUBSEQUENT ENCOUNTER: Primary | ICD-10-CM

## 2023-05-26 DIAGNOSIS — S52.501D CLOSED FRACTURE OF DISTAL END OF RIGHT RADIUS WITH ROUTINE HEALING, UNSPECIFIED FRACTURE MORPHOLOGY, SUBSEQUENT ENCOUNTER: ICD-10-CM

## 2023-05-26 PROCEDURE — 1159F PR MEDICATION LIST DOCUMENTED IN MEDICAL RECORD: ICD-10-PCS | Mod: CPTII,S$GLB,, | Performed by: ORTHOPAEDIC SURGERY

## 2023-05-26 PROCEDURE — 73110 XR WRIST COMPLETE 3 VIEWS RIGHT: ICD-10-PCS | Mod: 26,RT,, | Performed by: RADIOLOGY

## 2023-05-26 PROCEDURE — 3044F PR MOST RECENT HEMOGLOBIN A1C LEVEL <7.0%: ICD-10-PCS | Mod: CPTII,S$GLB,, | Performed by: ORTHOPAEDIC SURGERY

## 2023-05-26 PROCEDURE — 73110 X-RAY EXAM OF WRIST: CPT | Mod: 26,RT,, | Performed by: RADIOLOGY

## 2023-05-26 PROCEDURE — 99999 PR PBB SHADOW E&M-EST. PATIENT-LVL IV: CPT | Mod: PBBFAC,,, | Performed by: ORTHOPAEDIC SURGERY

## 2023-05-26 PROCEDURE — 99213 OFFICE O/P EST LOW 20 MIN: CPT | Mod: S$GLB,,, | Performed by: ORTHOPAEDIC SURGERY

## 2023-05-26 PROCEDURE — 1159F MED LIST DOCD IN RCRD: CPT | Mod: CPTII,S$GLB,, | Performed by: ORTHOPAEDIC SURGERY

## 2023-05-26 PROCEDURE — 73110 X-RAY EXAM OF WRIST: CPT | Mod: TC,PN,RT

## 2023-05-26 PROCEDURE — 3044F HG A1C LEVEL LT 7.0%: CPT | Mod: CPTII,S$GLB,, | Performed by: ORTHOPAEDIC SURGERY

## 2023-05-26 PROCEDURE — 99999 PR PBB SHADOW E&M-EST. PATIENT-LVL IV: ICD-10-PCS | Mod: PBBFAC,,, | Performed by: ORTHOPAEDIC SURGERY

## 2023-05-26 PROCEDURE — 99213 PR OFFICE/OUTPT VISIT, EST, LEVL III, 20-29 MIN: ICD-10-PCS | Mod: S$GLB,,, | Performed by: ORTHOPAEDIC SURGERY

## 2023-05-26 PROCEDURE — 3008F PR BODY MASS INDEX (BMI) DOCUMENTED: ICD-10-PCS | Mod: CPTII,S$GLB,, | Performed by: ORTHOPAEDIC SURGERY

## 2023-05-26 PROCEDURE — 3008F BODY MASS INDEX DOCD: CPT | Mod: CPTII,S$GLB,, | Performed by: ORTHOPAEDIC SURGERY

## 2023-05-26 NOTE — PROGRESS NOTES
Subjective:      Patient ID: Kalapna Rivera is a 62 y.o. female.    Chief Complaint: Follow-up (R Wrist )      HPI: Kalpana Rivera is here in follow-up of non-displaced right distal radius fracture treated non-operatively. She is now about 6 weeks out from initial injury. She was compliant with part-time bracing. Today, reports minimal pain but persistent swelling  over the dorsal wrist and thumb. She has been using her hand for most activity without issue    Past Medical History:   Diagnosis Date    Diabetes mellitus, type 2        Current Outpatient Medications:     b complex vitamins tablet, Take 1 tablet by mouth once daily., Disp: , Rfl:     blood-glucose sensor (DEXCOM G6 SENSOR) Brittany, 1 Device by Misc.(Non-Drug; Combo Route) route every 10 days., Disp: 3 each, Rfl: 11    cetirizine (ZYRTEC) 10 MG tablet, Take 10 mg by mouth once daily., Disp: , Rfl:     dulaglutide (TRULICITY) 0.75 mg/0.5 mL pen injector, Inject 0.75 mg into the skin every 7 days., Disp: 4 pen, Rfl: 5    fluticasone propionate (FLONASE) 50 mcg/actuation nasal spray, 1 spray by Each Nostril route once daily., Disp: , Rfl:     fluticasone propionate (FLONASE) 50 mcg/actuation nasal spray, 1 spray by Nasal route., Disp: , Rfl:     gabapentin (NEURONTIN) 100 MG capsule, Take 100 mg by mouth 3 (three) times daily., Disp: , Rfl:     insulin aspart U-100 (NOVOLOG FLEXPEN U-100 INSULIN) 100 unit/mL (3 mL) InPn pen, Inject 3 units with breakfast, 5 units with lunch and 5 units with dinner. Inject 1-2 units with snacks., Disp: 45 mL, Rfl: 1    Lactobacillus rhamnosus GG (CULTURELLE) 10 billion cell capsule, Take 1 capsule by mouth once daily., Disp: , Rfl:     metFORMIN (GLUCOPHAGE-XR) 500 MG ER 24hr tablet, Take 2 tablets (1,000 mg total) by mouth 2 (two) times daily with meals., Disp: 360 tablet, Rfl: 1    multivitamin with minerals tablet, Take 1 tablet by mouth once daily., Disp: , Rfl:     pen needle, diabetic (BD ULTRA-FINE SHORT PEN  "NEEDLE) 31 gauge x 5/16" Ndle, Use to inject insulin 4x daily, Disp: 400 each, Rfl: 1    TRESIBA FLEXTOUCH U-200 200 unit/mL (3 mL) insulin pen, , Disp: , Rfl:     TURMERIC ORAL, Take by mouth., Disp: , Rfl:     UNABLE TO FIND, Cranberry & Buchu natural supplement, Disp: , Rfl:     UNABLE TO FIND, Nerve Eight natural supplement, Disp: , Rfl:     UNABLE TO FIND, [place] natural supplement, Disp: , Rfl:     VITAMIN B COMPLEX ORAL, Take 1 tablet by mouth., Disp: , Rfl:     blood-glucose transmitter (DEXCOM G6 TRANSMITTER) Brittany, 2 Devices by Misc.(Non-Drug; Combo Route) route every 6 (six) months., Disp: 1 each, Rfl: 3    gabapentin (NEURONTIN) 300 MG capsule, Take 1 capsule (300 mg total) by mouth 2 (two) times daily. (Patient not taking: Reported on 4/15/2023), Disp: 60 capsule, Rfl: 11    INPEN, NOVOLOG OR FIASP, PINK InPn, Inject 1 each into the skin once. for 1 dose, Disp: 1 each, Rfl: 0    traMADoL (ULTRAM) 50 mg tablet, Take 1 tablet (50 mg total) by mouth every 8 (eight) hours as needed for Pain. (Patient not taking: Reported on 4/17/2023), Disp: 12 each, Rfl: 0  Review of patient's allergies indicates:   Allergen Reactions    Azithromycin Anaphylaxis       Ht 4' 11" (1.499 m)   Wt 77.1 kg (170 lb)   BMI 34.34 kg/m²     Review of Systems   Constitutional: Negative for chills and fever.   Cardiovascular:  Negative for chest pain and palpitations.   Respiratory:  Negative for shortness of breath and wheezing.    Skin:  Negative for poor wound healing and rash.   Musculoskeletal:  Positive for joint swelling. Negative for joint pain and stiffness.   Gastrointestinal:  Negative for nausea and vomiting.   Genitourinary:  Negative for dysuria and hematuria.   Neurological:  Negative for seizures and tremors.   Psychiatric/Behavioral:  Negative for altered mental status.    Allergic/Immunologic: Negative for environmental allergies and persistent infections.       Objective:    Ortho Exam       GEN: Well developed, " well nourished loyda. AAOX3. No acute distress.   Normocephalic, atraumatic.   FADI  Breathing unlabored.  Mood and affect appropriate.   RIGHT UE: Skin intact. Swelling mild -- dorsal wrist. TTP minimal. ROM full. Sensation intact. Tinels negative. Pulses present. Cap refill brisk.  strength intact. Special tests: none    Assessment:     Imaging:  I have personally reviewed and interpreted the radiographs. Xray of the right wrist shows no evidence of fracture. Fracture healed.         1. Closed fracture of distal end of right radius with routine healing, unspecified fracture morphology, subsequent encounter          Plan:          Great recovery  Recommend activity as tolerated   Start topical NSAID for swelling    Follow up if symptoms worsen or fail to improve.

## 2023-05-26 NOTE — PROGRESS NOTES
Subjective:      Patient ID: Kalpana Rivera is a 62 y.o. female.    Chief Complaint:  Diabetes    History of Present Illness  Kalpana Rivera presents today for follow up of JULIANN now managed as type 1 diabetic-insulin dependent. Previous patient of Dr. Zeng and myself. Last seen in April 2022.      Ref. Range 9/14/2020 07:45   Glucose Latest Ref Range: 65 - 99 mg/dL 154 (H)      Latest Reference Range & Units 09/14/20 07:45   C-Peptide 0.80 - 3.85 ng/mL 0.10 (L)   (L): Data is abnormally low   Latest Reference Range & Units 09/14/20 07:45   Glutamate decarboxylase 65 Ab <5 IU/mL 97 (H)   (H): Data is abnormally high    She is seeing back specialist that is also causing numbness in her legs. Was seeing PT who did dry needling and was very painful for her. States she is afraid to do EMG. Has stopped gabapentin as she did not feel any relief.    With regards to the diabetes:    Diagnosed with diabetes age 24; diagnosed as JULIANN in 2020  Started insulin during first pregnancy and able to come off. In 1992 had son and has been on insulin since that time  Nephew is type 1  Family History of Type 2 DM: mother and father  Known complications:  DKA/HHS: twice in the past  RN: denies; up to date with eye exam --sees outside   PN: +  Nephropathy: -; due in Aug 2023  Gastroparesis: denies  CAD: denies    We prescribed INPEN but had issues with cartridges and expiration of INPEN so we decided to hold off at this time.     Current regimen:  Tresiba 24 units daily   Trulicity 0.75 mg weekly   Metformin 1000 mg twice daily   Novolog                                   Breakfast:           3-4 Units              Lunch:                5 Units              Dinner:               5 Units    States she changes her novolog based on what she is eating at times; sometimes reduces    Has been taking novolog 10-15 minutes prior to a meal most days. Takes right before a meal if her BG is <110.  States she is afraid to give insulin at  times before a meal because she is afraid she will drop- takes detention through meal if her BG <100 (states this occurs 3 times a month). She is changing needle every time and rotating injection sites.     Missed doses-denies     Other medications tried:  Trulicity 1.5 mg weekly- very low appetite so stopped  levemir    4 times daily  See media tab.   She loves the dexcom.   Average    GMI 6.8%  79% in target; 17% high; 2% very high    Hypoglycemic event 1%  Yes, did not need assistance   Knows how to correct with 15 grams of carbs- juice, coke, or a peppermint.     Dietary recall: She feels that she is following diabetic diet  Eats 3 meals a day. States that she has noticed portion sizes decreased because she is not as hungry.  B: greek yogurt with splenda or protein shake -premier (7-7:30)  L: healthy choice or grilled cheese sandwich (12-1)  D: same as lunch (6:30-7)   She does not cook often but has some frozen foods.   She has been working from home.   Snacks: not really snacking as much anymore; pickles and cashews; carrots; not normally snacking after dinner  Drinks: water, green tea, and diet luli  Had box of raisins before     Exercise -She stopped PT and stopped exercises she learned. She fell at home around April 2023 and fractured right wrist. Limited with back /leg pain.    Education - last visit: 2/2023    Has a Medic alert tag-has bracelet   Glucagon/Baqsimi-declines; lives alone    Diabetes Management Status  Statin: Not taking  ACE/ARB: Not taking -stopped due to diarrhea    Lab Results   Component Value Date    HGBA1C 6.8 (H) 01/30/2023    HGBA1C 7.1 (H) 08/02/2022    HGBA1C 6.9 (H) 03/28/2022     Screening or Prevention Patient's value Goal Complete/Controlled?   HgA1C Testing and Control   Lab Results   Component Value Date    HGBA1C 6.8 (H) 01/30/2023      Annually/Less than 8% Yes   Lipid profile : 03/28/2022 Annually Yes   LDL control Lab Results   Component Value Date    LDLCALC 65.4  03/28/2022    Annually/Less than 100 mg/dl  Yes   Nephropathy screening Lab Results   Component Value Date    LABMICR <5.0 08/02/2022     No results found for: PROTEINUA Annually No   Blood pressure BP Readings from Last 1 Encounters:   05/30/23 (!) 140/76    Less than 140/90 Yes   Dilated retinal exam : 06/03/2022 Annually Yes   Foot exam   : 01/30/2023 Annually Yes     With regards to thyroid nodule,     Remembers being told in the past that she had a thyroid nodule and needed biopsy which she did not do.     Denies hoarseness or dysphagia.     No radiation to head or neck. No history of thyroid cancer in family.     Thyroid US 4/5/23  Impression:     1.  Thyroid gland is normal in size with homogenous echotexture.     2.  1 nodule in the right thyroid described above.  It does not meet criteria for fine-needle aspiration.     RECOMMENDATIONS:  Follow-up ultrasound in 2 years is recommended.  RECOMMENDATIONS:  Repeat thyroid ultrasound in 2 years.    Lab Results   Component Value Date    TSH 2.069 01/30/2023     With regards to dyslipidemia,     Was prescribed pravastatin 10 mg daily but stopped due to diarrhea         Of note, she had elevated CO2 --declines sleep study at this time.    Review of Systems   Constitutional:  Negative for fatigue and unexpected weight change.   Eyes:  Negative for visual disturbance.   Endocrine: Negative for polydipsia, polyphagia and polyuria.   Neurological:  Positive for numbness.     Objective:   Physical Exam  Constitutional:       Appearance: Normal appearance.   Cardiovascular:      Rate and Rhythm: Normal rate.   Neurological:      Mental Status: She is alert.   Denies injection site edema or erythema. No lipo hypertropthy or atrophy  Diabetes Foot Exam:  Feet no cuts or scratches  Shoes appropriate  DM foot exam deferred; done in Jan 2023    Visit Vitals  BP (!) 140/76   Pulse 95   Resp 16   Wt 77.6 kg (171 lb 1.2 oz)   SpO2 99%   BMI 34.55 kg/m²          Lab Review:   Lab  Results   Component Value Date    HGBA1C 6.8 (H) 01/30/2023    HGBA1C 7.1 (H) 08/02/2022    HGBA1C 6.9 (H) 03/28/2022      Lab Results   Component Value Date    CHOL 134 03/28/2022    HDL 61 03/28/2022    LDLCALC 65.4 03/28/2022    TRIG 38 03/28/2022    CHOLHDL 45.5 03/28/2022     Lab Results   Component Value Date     01/30/2023    K 4.1 01/30/2023     01/30/2023    CO2 22 (L) 01/30/2023     (H) 01/30/2023    BUN 9 01/30/2023    CREATININE 0.6 01/30/2023    CALCIUM 9.6 01/30/2023    PROT 6.8 01/30/2023    ALBUMIN 3.8 01/30/2023    BILITOT 0.3 01/30/2023    ALKPHOS 77 01/30/2023    AST 22 01/30/2023    ALT 25 01/30/2023    ANIONGAP 13 01/30/2023    ESTGFRAFRICA >60.0 03/28/2022    EGFRNONAA >60.0 03/28/2022    TSH 2.069 01/30/2023     No results found for: [id number]  Assessment and Plan     1. 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 Thyroid nodule        3. Class 1 obesity due to excess calories with serious comorbidity and body mass index (BMI) of 34.0 to 34.9 in adult        4. 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 Switch to Mounjaro due to satiety effects which may help with weight loss.  Restart pravastatin 10 mg daily  Dexcom A1c at goal with hypoglycemia overnight    Continue  Metformin 1000 mg twice daily   Decrease Tresiba to 22 units daily   Continue  Novolog                                    Breakfast:           3-4 Units              Lunch:                5 Units              Dinner:               5 Units    Start Mounjaro 5 mg weekly.     Start pravastatin 10 mg daily  Will likely change to half units of all novolog insulin above when she starts inpen.      -- Please try to give insulin at least 10 minutes before a meal.   -- Reviewed patient's current insulin regimen. Clarified proper insulin dose and timing in relation to meals, etc. Insulin injection sites and proper rotation instructed.    -- Advised frequent self blood glucose monitoring.  Patient encouraged to document glucose results and bring them to every clinic visit    -- Hypoglycemia precautions discussed. Instructed on precautions before driving.    -- Call for Bg repeatedly < 90 or > 180.   -- Close adherence to lifestyle changes recommended.   -- Periodic follow ups for eye evaluations, foot care and dental care suggested.  -- Given information on diabetic snacks and hypoglycemia previously  Cholesterol at goal without medications    Thyroid nodule  -- Small stable nodule on US in 2022  -- Denies compressive symptoms  -- Repeat thyroid US in 2024    Class 1 obesity due to excess calories with serious comorbidity and body mass index (BMI) of 34.0 to 34.9 in adult  Continue Trulicity as above  States she is losing weight since increasing exercise     Follow up in about 4 months (around 9/30/2023).  Labs prior to visit

## 2023-05-29 NOTE — ASSESSMENT & PLAN NOTE
-- Reviewed goals of therapy are to get the best control we can without hypoglycemia  -- Treat as type 1 diabetic. Switch to Mounjaro due to satiety effects which may help with weight loss.  Restart pravastatin 10 mg daily  Dexcom A1c at goal with hypoglycemia overnight    Continue  Metformin 1000 mg twice daily   Decrease Tresiba to 22 units daily   Continue  Novolog                                   Breakfast:           3-4 Units              Lunch:                5 Units              Dinner:               5 Units    Start Mounjaro 5 mg weekly.     Start pravastatin 10 mg daily  Will likely change to half units of all novolog insulin above when she starts inpen.      -- Please try to give insulin at least 10 minutes before a meal.   -- Reviewed patient's current insulin regimen. Clarified proper insulin dose and timing in relation to meals, etc. Insulin injection sites and proper rotation instructed.    -- Advised frequent self blood glucose monitoring.  Patient encouraged to document glucose results and bring them to every clinic visit    -- Hypoglycemia precautions discussed. Instructed on precautions before driving.    -- Call for Bg repeatedly < 90 or > 180.   -- Close adherence to lifestyle changes recommended.   -- Periodic follow ups for eye evaluations, foot care and dental care suggested.  -- Given information on diabetic snacks and hypoglycemia previously  Cholesterol at goal without medications

## 2023-05-29 NOTE — ASSESSMENT & PLAN NOTE
-- Small stable nodule on US in 2022  -- Denies compressive symptoms  -- Repeat thyroid US in 2024

## 2023-05-30 ENCOUNTER — OFFICE VISIT (OUTPATIENT)
Dept: ENDOCRINOLOGY | Facility: CLINIC | Age: 63
End: 2023-05-30
Payer: COMMERCIAL

## 2023-05-30 ENCOUNTER — PATIENT MESSAGE (OUTPATIENT)
Dept: ENDOCRINOLOGY | Facility: CLINIC | Age: 63
End: 2023-05-30

## 2023-05-30 ENCOUNTER — LAB VISIT (OUTPATIENT)
Dept: LAB | Facility: HOSPITAL | Age: 63
End: 2023-05-30
Payer: COMMERCIAL

## 2023-05-30 VITALS
DIASTOLIC BLOOD PRESSURE: 76 MMHG | SYSTOLIC BLOOD PRESSURE: 140 MMHG | WEIGHT: 171.06 LBS | OXYGEN SATURATION: 99 % | RESPIRATION RATE: 16 BRPM | BODY MASS INDEX: 34.55 KG/M2 | HEART RATE: 95 BPM

## 2023-05-30 DIAGNOSIS — E10.9 TYPE 1 DIABETES MELLITUS WITHOUT COMPLICATION: ICD-10-CM

## 2023-05-30 DIAGNOSIS — E10.3393 TYPE 1 DIABETES MELLITUS WITH MODERATE NONPROLIFERATIVE RETINOPATHY OF BOTH EYES WITHOUT MACULAR EDEMA: Primary | ICD-10-CM

## 2023-05-30 DIAGNOSIS — E04.1 THYROID NODULE: ICD-10-CM

## 2023-05-30 DIAGNOSIS — E66.09 CLASS 1 OBESITY DUE TO EXCESS CALORIES WITH SERIOUS COMORBIDITY AND BODY MASS INDEX (BMI) OF 34.0 TO 34.9 IN ADULT: ICD-10-CM

## 2023-05-30 DIAGNOSIS — E10.3393 TYPE 1 DIABETES MELLITUS WITH MODERATE NONPROLIFERATIVE RETINOPATHY OF BOTH EYES WITHOUT MACULAR EDEMA: ICD-10-CM

## 2023-05-30 LAB
ALBUMIN SERPL BCP-MCNC: 3.9 G/DL (ref 3.5–5.2)
ALP SERPL-CCNC: 72 U/L (ref 55–135)
ALT SERPL W/O P-5'-P-CCNC: 22 U/L (ref 10–44)
ANION GAP SERPL CALC-SCNC: 8 MMOL/L (ref 8–16)
AST SERPL-CCNC: 19 U/L (ref 10–40)
BILIRUB SERPL-MCNC: 0.4 MG/DL (ref 0.1–1)
BUN SERPL-MCNC: 8 MG/DL (ref 8–23)
CALCIUM SERPL-MCNC: 9.6 MG/DL (ref 8.7–10.5)
CHLORIDE SERPL-SCNC: 105 MMOL/L (ref 95–110)
CO2 SERPL-SCNC: 28 MMOL/L (ref 23–29)
CREAT SERPL-MCNC: 0.6 MG/DL (ref 0.5–1.4)
EST. GFR  (NO RACE VARIABLE): >60 ML/MIN/1.73 M^2
ESTIMATED AVG GLUCOSE: 137 MG/DL (ref 68–131)
GLUCOSE SERPL-MCNC: 129 MG/DL (ref 70–110)
HBA1C MFR BLD: 6.4 % (ref 4–5.6)
POTASSIUM SERPL-SCNC: 4.6 MMOL/L (ref 3.5–5.1)
PROT SERPL-MCNC: 6.8 G/DL (ref 6–8.4)
SODIUM SERPL-SCNC: 141 MMOL/L (ref 136–145)

## 2023-05-30 PROCEDURE — 3078F PR MOST RECENT DIASTOLIC BLOOD PRESSURE < 80 MM HG: ICD-10-PCS | Mod: CPTII,S$GLB,, | Performed by: NURSE PRACTITIONER

## 2023-05-30 PROCEDURE — 1160F PR REVIEW ALL MEDS BY PRESCRIBER/CLIN PHARMACIST DOCUMENTED: ICD-10-PCS | Mod: CPTII,S$GLB,, | Performed by: NURSE PRACTITIONER

## 2023-05-30 PROCEDURE — 95251 CONT GLUC MNTR ANALYSIS I&R: CPT | Mod: S$GLB,,, | Performed by: NURSE PRACTITIONER

## 2023-05-30 PROCEDURE — 83036 HEMOGLOBIN GLYCOSYLATED A1C: CPT | Performed by: NURSE PRACTITIONER

## 2023-05-30 PROCEDURE — 99999 PR PBB SHADOW E&M-EST. PATIENT-LVL V: ICD-10-PCS | Mod: PBBFAC,,, | Performed by: NURSE PRACTITIONER

## 2023-05-30 PROCEDURE — 3044F HG A1C LEVEL LT 7.0%: CPT | Mod: CPTII,S$GLB,, | Performed by: NURSE PRACTITIONER

## 2023-05-30 PROCEDURE — 36415 COLL VENOUS BLD VENIPUNCTURE: CPT | Performed by: NURSE PRACTITIONER

## 2023-05-30 PROCEDURE — 3008F PR BODY MASS INDEX (BMI) DOCUMENTED: ICD-10-PCS | Mod: CPTII,S$GLB,, | Performed by: NURSE PRACTITIONER

## 2023-05-30 PROCEDURE — 1159F PR MEDICATION LIST DOCUMENTED IN MEDICAL RECORD: ICD-10-PCS | Mod: CPTII,S$GLB,, | Performed by: NURSE PRACTITIONER

## 2023-05-30 PROCEDURE — 1159F MED LIST DOCD IN RCRD: CPT | Mod: CPTII,S$GLB,, | Performed by: NURSE PRACTITIONER

## 2023-05-30 PROCEDURE — 3077F SYST BP >= 140 MM HG: CPT | Mod: CPTII,S$GLB,, | Performed by: NURSE PRACTITIONER

## 2023-05-30 PROCEDURE — 99999 PR PBB SHADOW E&M-EST. PATIENT-LVL V: CPT | Mod: PBBFAC,,, | Performed by: NURSE PRACTITIONER

## 2023-05-30 PROCEDURE — 80053 COMPREHEN METABOLIC PANEL: CPT | Performed by: NURSE PRACTITIONER

## 2023-05-30 PROCEDURE — 3044F PR MOST RECENT HEMOGLOBIN A1C LEVEL <7.0%: ICD-10-PCS | Mod: CPTII,S$GLB,, | Performed by: NURSE PRACTITIONER

## 2023-05-30 PROCEDURE — 99214 OFFICE O/P EST MOD 30 MIN: CPT | Mod: 25,S$GLB,, | Performed by: NURSE PRACTITIONER

## 2023-05-30 PROCEDURE — 99214 PR OFFICE/OUTPT VISIT, EST, LEVL IV, 30-39 MIN: ICD-10-PCS | Mod: 25,S$GLB,, | Performed by: NURSE PRACTITIONER

## 2023-05-30 PROCEDURE — 95251 PR GLUCOSE MONITOR, 72 HOUR, PHYS INTERP: ICD-10-PCS | Mod: S$GLB,,, | Performed by: NURSE PRACTITIONER

## 2023-05-30 PROCEDURE — 3008F BODY MASS INDEX DOCD: CPT | Mod: CPTII,S$GLB,, | Performed by: NURSE PRACTITIONER

## 2023-05-30 PROCEDURE — 3077F PR MOST RECENT SYSTOLIC BLOOD PRESSURE >= 140 MM HG: ICD-10-PCS | Mod: CPTII,S$GLB,, | Performed by: NURSE PRACTITIONER

## 2023-05-30 PROCEDURE — 1160F RVW MEDS BY RX/DR IN RCRD: CPT | Mod: CPTII,S$GLB,, | Performed by: NURSE PRACTITIONER

## 2023-05-30 PROCEDURE — 3078F DIAST BP <80 MM HG: CPT | Mod: CPTII,S$GLB,, | Performed by: NURSE PRACTITIONER

## 2023-05-30 RX ORDER — BLOOD-GLUCOSE SENSOR
EACH MISCELLANEOUS
Qty: 3 EACH | Refills: 3 | Status: SHIPPED | OUTPATIENT
Start: 2023-05-30 | End: 2023-12-10 | Stop reason: SDUPTHER

## 2023-05-30 RX ORDER — METFORMIN HYDROCHLORIDE 500 MG/1
1000 TABLET, EXTENDED RELEASE ORAL 2 TIMES DAILY WITH MEALS
Qty: 360 TABLET | Refills: 1 | Status: SHIPPED | OUTPATIENT
Start: 2023-05-30 | End: 2024-02-16

## 2023-05-30 RX ORDER — TIRZEPATIDE 5 MG/.5ML
5 INJECTION, SOLUTION SUBCUTANEOUS
Qty: 4 PEN | Refills: 0 | Status: SHIPPED | OUTPATIENT
Start: 2023-05-30 | End: 2023-08-01

## 2023-05-30 RX ORDER — BLOOD-GLUCOSE SENSOR
EACH MISCELLANEOUS
Qty: 3 EACH | Refills: 3 | Status: SHIPPED | OUTPATIENT
Start: 2023-05-30 | End: 2023-05-30

## 2023-06-01 ENCOUNTER — PATIENT MESSAGE (OUTPATIENT)
Dept: FAMILY MEDICINE | Facility: CLINIC | Age: 63
End: 2023-06-01
Payer: COMMERCIAL

## 2023-06-01 NOTE — TELEPHONE ENCOUNTER
Test has to be done awake for accurate results; if pt cannot tolerate we would hold off on this test and continue current treatment.

## 2023-06-23 ENCOUNTER — PATIENT MESSAGE (OUTPATIENT)
Dept: ADMINISTRATIVE | Facility: HOSPITAL | Age: 63
End: 2023-06-23
Payer: COMMERCIAL

## 2023-06-23 ENCOUNTER — PATIENT OUTREACH (OUTPATIENT)
Dept: ADMINISTRATIVE | Facility: HOSPITAL | Age: 63
End: 2023-06-23
Payer: COMMERCIAL

## 2023-06-23 NOTE — PROGRESS NOTES
Care Everywhere updates requested and reviewed.  Immunizations reconciled. Media reports reviewed.  Duplicate HM overrides and  orders removed.  Overdue HM topic chart audit and/or requested.  Overdue lab testing linked to upcoming lab appointments if applies.        Lab aisha, and [facility] reviewed   Pap Smear and Lab testing   *    DIS reviewed      Mammogram and DEXA  *    [facility] orders placed. *  Letter mailed.  *  HM updated with external * report.   Requested *  records     Health Maintenance Due   Topic Date Due    Hepatitis C Screening  Never done    Pneumococcal Vaccines (Age 0-64) (1 - PCV) Never done    HIV Screening  Never done    Low Dose Statin  Never done    Colorectal Cancer Screening  Never done    Shingles Vaccine (1 of 2) Never done    COVID-19 Vaccine (3 - Pfizer series) 2021    Lipid Panel  2023    Eye Exam  2023    Diabetes Urine Screening  2023

## 2023-06-26 DIAGNOSIS — E10.9 TYPE 1 DIABETES MELLITUS WITHOUT COMPLICATION: ICD-10-CM

## 2023-06-26 RX ORDER — INSULIN ASPART 100 [IU]/ML
INJECTION, SOLUTION INTRAVENOUS; SUBCUTANEOUS
Qty: 45 ML | Refills: 1 | Status: SHIPPED | OUTPATIENT
Start: 2023-06-26

## 2023-06-30 LAB
LEFT EYE DM RETINOPATHY: POSITIVE
RIGHT EYE DM RETINOPATHY: POSITIVE

## 2023-07-07 ENCOUNTER — OFFICE VISIT (OUTPATIENT)
Dept: FAMILY MEDICINE | Facility: CLINIC | Age: 63
End: 2023-07-07
Payer: COMMERCIAL

## 2023-07-07 VITALS
WEIGHT: 167.44 LBS | HEIGHT: 59 IN | OXYGEN SATURATION: 98 % | SYSTOLIC BLOOD PRESSURE: 126 MMHG | DIASTOLIC BLOOD PRESSURE: 74 MMHG | HEART RATE: 110 BPM | TEMPERATURE: 98 F | BODY MASS INDEX: 33.76 KG/M2

## 2023-07-07 DIAGNOSIS — E11.3393 TYPE 2 DIABETES MELLITUS WITH BOTH EYES AFFECTED BY MODERATE NONPROLIFERATIVE RETINOPATHY WITHOUT MACULAR EDEMA, WITH LONG-TERM CURRENT USE OF INSULIN: ICD-10-CM

## 2023-07-07 DIAGNOSIS — E66.09 CLASS 1 OBESITY DUE TO EXCESS CALORIES WITH SERIOUS COMORBIDITY AND BODY MASS INDEX (BMI) OF 33.0 TO 33.9 IN ADULT: ICD-10-CM

## 2023-07-07 DIAGNOSIS — Z79.4 TYPE 2 DIABETES MELLITUS WITH BOTH EYES AFFECTED BY MODERATE NONPROLIFERATIVE RETINOPATHY WITHOUT MACULAR EDEMA, WITH LONG-TERM CURRENT USE OF INSULIN: ICD-10-CM

## 2023-07-07 DIAGNOSIS — G57.13 MERALGIA PARESTHETICA, BILATERAL LOWER LIMBS: Primary | ICD-10-CM

## 2023-07-07 PROBLEM — Z74.09 IMPAIRED MOBILITY AND ACTIVITIES OF DAILY LIVING: Status: RESOLVED | Noted: 2021-10-13 | Resolved: 2023-07-07

## 2023-07-07 PROBLEM — Z78.9 IMPAIRED MOBILITY AND ACTIVITIES OF DAILY LIVING: Status: RESOLVED | Noted: 2021-10-13 | Resolved: 2023-07-07

## 2023-07-07 PROCEDURE — 3008F PR BODY MASS INDEX (BMI) DOCUMENTED: ICD-10-PCS | Mod: CPTII,S$GLB,, | Performed by: STUDENT IN AN ORGANIZED HEALTH CARE EDUCATION/TRAINING PROGRAM

## 2023-07-07 PROCEDURE — 1160F PR REVIEW ALL MEDS BY PRESCRIBER/CLIN PHARMACIST DOCUMENTED: ICD-10-PCS | Mod: CPTII,S$GLB,, | Performed by: STUDENT IN AN ORGANIZED HEALTH CARE EDUCATION/TRAINING PROGRAM

## 2023-07-07 PROCEDURE — 99999 PR PBB SHADOW E&M-EST. PATIENT-LVL V: CPT | Mod: PBBFAC,,, | Performed by: STUDENT IN AN ORGANIZED HEALTH CARE EDUCATION/TRAINING PROGRAM

## 2023-07-07 PROCEDURE — 1159F MED LIST DOCD IN RCRD: CPT | Mod: CPTII,S$GLB,, | Performed by: STUDENT IN AN ORGANIZED HEALTH CARE EDUCATION/TRAINING PROGRAM

## 2023-07-07 PROCEDURE — 99214 OFFICE O/P EST MOD 30 MIN: CPT | Mod: S$GLB,,, | Performed by: STUDENT IN AN ORGANIZED HEALTH CARE EDUCATION/TRAINING PROGRAM

## 2023-07-07 PROCEDURE — 3044F HG A1C LEVEL LT 7.0%: CPT | Mod: CPTII,S$GLB,, | Performed by: STUDENT IN AN ORGANIZED HEALTH CARE EDUCATION/TRAINING PROGRAM

## 2023-07-07 PROCEDURE — 3044F PR MOST RECENT HEMOGLOBIN A1C LEVEL <7.0%: ICD-10-PCS | Mod: CPTII,S$GLB,, | Performed by: STUDENT IN AN ORGANIZED HEALTH CARE EDUCATION/TRAINING PROGRAM

## 2023-07-07 PROCEDURE — 99214 PR OFFICE/OUTPT VISIT, EST, LEVL IV, 30-39 MIN: ICD-10-PCS | Mod: S$GLB,,, | Performed by: STUDENT IN AN ORGANIZED HEALTH CARE EDUCATION/TRAINING PROGRAM

## 2023-07-07 PROCEDURE — 3008F BODY MASS INDEX DOCD: CPT | Mod: CPTII,S$GLB,, | Performed by: STUDENT IN AN ORGANIZED HEALTH CARE EDUCATION/TRAINING PROGRAM

## 2023-07-07 PROCEDURE — 1159F PR MEDICATION LIST DOCUMENTED IN MEDICAL RECORD: ICD-10-PCS | Mod: CPTII,S$GLB,, | Performed by: STUDENT IN AN ORGANIZED HEALTH CARE EDUCATION/TRAINING PROGRAM

## 2023-07-07 PROCEDURE — 3074F PR MOST RECENT SYSTOLIC BLOOD PRESSURE < 130 MM HG: ICD-10-PCS | Mod: CPTII,S$GLB,, | Performed by: STUDENT IN AN ORGANIZED HEALTH CARE EDUCATION/TRAINING PROGRAM

## 2023-07-07 PROCEDURE — 3074F SYST BP LT 130 MM HG: CPT | Mod: CPTII,S$GLB,, | Performed by: STUDENT IN AN ORGANIZED HEALTH CARE EDUCATION/TRAINING PROGRAM

## 2023-07-07 PROCEDURE — 3078F DIAST BP <80 MM HG: CPT | Mod: CPTII,S$GLB,, | Performed by: STUDENT IN AN ORGANIZED HEALTH CARE EDUCATION/TRAINING PROGRAM

## 2023-07-07 PROCEDURE — 3078F PR MOST RECENT DIASTOLIC BLOOD PRESSURE < 80 MM HG: ICD-10-PCS | Mod: CPTII,S$GLB,, | Performed by: STUDENT IN AN ORGANIZED HEALTH CARE EDUCATION/TRAINING PROGRAM

## 2023-07-07 PROCEDURE — 99999 PR PBB SHADOW E&M-EST. PATIENT-LVL V: ICD-10-PCS | Mod: PBBFAC,,, | Performed by: STUDENT IN AN ORGANIZED HEALTH CARE EDUCATION/TRAINING PROGRAM

## 2023-07-07 PROCEDURE — 1160F RVW MEDS BY RX/DR IN RCRD: CPT | Mod: CPTII,S$GLB,, | Performed by: STUDENT IN AN ORGANIZED HEALTH CARE EDUCATION/TRAINING PROGRAM

## 2023-07-07 RX ORDER — PRAVASTATIN SODIUM 10 MG/1
10 TABLET ORAL NIGHTLY
COMMUNITY
Start: 2023-06-26 | End: 2024-03-20 | Stop reason: SDUPTHER

## 2023-07-07 NOTE — PROGRESS NOTES
Subjective:       Patient ID: Kalpana Rivera is a 62 y.o. female.    Chief Complaint: Follow-up      Active Problem List with Overview Notes    Diagnosis Date Noted    Meralgia paresthetica, bilateral lower limbs 03/24/2023     Pain superficial on b/l in distribution lateral cutaneous nerve  MRI last year did show some stenosis L5/S1 as well she was offered injections but she declined at that time   gabapentin does not really help symptoms  Went to PT for IT band and did find any of this helped  Notes some numbness and burning   Pain in skin and very painful with PT dry needling   Does not think she could handle NCS   Lateral thigh; worse after prolonged standing or sitting   We discussed US but advised she should at least try the injection in back as may provide some benefit for symptoms; she will reach back out to spine clinic for this       Type 2 diabetes mellitus with both eyes affected by moderate nonproliferative retinopathy without macular edema, with long-term current use of insulin 09/21/2020     Managed by endo   Mounjaro 5mg   tresiba 22 units QHS; novolog SS with meals   Metformin 1000 BID   No ACE/ARB  Restarted statin every other day and tolerating ok   dexCom transitioning to G7      Thyroid nodule 09/21/2020     Managed by endo   Thyroid labs normal   Upcoming US       Class 1 obesity due to excess calories with serious comorbidity and body mass index (BMI) of 33.0 to 33.9 in adult 08/10/2020     Continues to work on diet and exercise  difficult to lose weight   Insulin also not helping   Recently switched to mounjaro per endo and lost 5lbs already           Review of Systems   Musculoskeletal:  Positive for arthralgias and myalgias.   All other systems reviewed and are negative.     A1C:  Recent Labs   Lab 08/02/22  0756 01/30/23  0830 05/30/23  0923   Hemoglobin A1C 7.1 H 6.8 H 6.4 H     CBC:      CMP:  Recent Labs   Lab 08/02/22  0756 01/30/23  0830 05/30/23  0923   Glucose 197 H 133 H 129 H    Calcium 8.8 9.6 9.6   Albumin 4.3 3.8 3.9   Total Protein 6.9 6.8 6.8   Sodium 139 141 141   Potassium 4.7 4.1 4.6   CO2 29 22 L 28   Chloride 99 106 105   BUN 5 L 9 8   Creatinine 0.54 0.6 0.6   Alkaline Phosphatase 72 77 72   ALT 32 25 22   AST 37 22 19   Total Bilirubin 0.6 0.3 0.4     LIPIDS:  Recent Labs   Lab 03/28/22  0745 01/30/23  0830   TSH  --  2.069   HDL 61  --    Cholesterol 134  --    Triglycerides 38  --    LDL Cholesterol 65.4  --    HDL/Cholesterol Ratio 45.5  --    Non-HDL Cholesterol 73  --    Total Cholesterol/HDL Ratio 2.2  --      TSH:  Recent Labs   Lab 09/14/20  0745 01/30/23  0830   TSH 1.80 2.069        Objective:      Vitals:    07/07/23 0829   BP: 126/74   Pulse: 110   Temp: 98.4 °F (36.9 °C)      Physical Exam  Vitals reviewed.   Constitutional:       Appearance: Normal appearance. She is obese.   HENT:      Head: Normocephalic and atraumatic.   Eyes:      Conjunctiva/sclera: Conjunctivae normal.   Cardiovascular:      Rate and Rhythm: Normal rate and regular rhythm.      Heart sounds: Normal heart sounds.   Pulmonary:      Effort: Pulmonary effort is normal.      Breath sounds: Normal breath sounds.   Abdominal:      Palpations: Abdomen is soft.      Tenderness: There is no abdominal tenderness.   Musculoskeletal:         General: Normal range of motion.      Cervical back: Normal range of motion.      Right lower leg: No edema.      Left lower leg: No edema.   Neurological:      General: No focal deficit present.      Mental Status: She is alert and oriented to person, place, and time.   Psychiatric:         Mood and Affect: Mood normal.         Behavior: Behavior normal.        Assessment:       1. Meralgia paresthetica, bilateral lower limbs    2. Type 2 diabetes mellitus with both eyes affected by moderate nonproliferative retinopathy without macular edema, with long-term current use of insulin    3. Class 1 obesity due to excess calories with serious comorbidity and body mass  index (BMI) of 33.0 to 33.9 in adult        Plan:   1. Meralgia paresthetica, bilateral lower limbs  - US Soft Tissue, Lower Extremity, Left; Future    2. Type 2 diabetes mellitus with both eyes affected by moderate nonproliferative retinopathy without macular edema, with long-term current use of insulin    3. Class 1 obesity due to excess calories with serious comorbidity and body mass index (BMI) of 33.0 to 33.9 in adult     US LE   Discussed going ahead with injection in back to see if this will offer some relief for the pains; she will do so  RTC in 6 months and/or PRN       [facility]mayra Mcd[facility]   7/7/23     I spent a total of >30 minutes on the day of the visit.This includes face to face time and non-face to face time preparing to see the patient (eg, review of tests), obtaining and/or reviewing separately obtained history, documenting clinical information in the electronic or other health record, independently interpreting results and communicating results to the patient/family/caregiver, or care coordinator.

## 2023-07-14 ENCOUNTER — PATIENT OUTREACH (OUTPATIENT)
Dept: ADMINISTRATIVE | Facility: HOSPITAL | Age: 63
End: 2023-07-14
Payer: COMMERCIAL

## 2023-08-01 DIAGNOSIS — E10.9 TYPE 1 DIABETES MELLITUS WITHOUT COMPLICATION: ICD-10-CM

## 2023-08-01 RX ORDER — TIRZEPATIDE 5 MG/.5ML
5 INJECTION, SOLUTION SUBCUTANEOUS
Qty: 4 PEN | Refills: 4 | Status: SHIPPED | OUTPATIENT
Start: 2023-08-01 | End: 2023-11-06

## 2023-09-28 ENCOUNTER — PATIENT MESSAGE (OUTPATIENT)
Dept: SPORTS MEDICINE | Facility: CLINIC | Age: 63
End: 2023-09-28
Payer: COMMERCIAL

## 2023-10-02 ENCOUNTER — PATIENT MESSAGE (OUTPATIENT)
Dept: ENDOCRINOLOGY | Facility: CLINIC | Age: 63
End: 2023-10-02
Payer: COMMERCIAL

## 2023-10-03 DIAGNOSIS — E10.3393 TYPE 1 DIABETES MELLITUS WITH MODERATE NONPROLIFERATIVE RETINOPATHY OF BOTH EYES WITHOUT MACULAR EDEMA: Primary | ICD-10-CM

## 2023-10-05 ENCOUNTER — TELEPHONE (OUTPATIENT)
Dept: PODIATRY | Facility: CLINIC | Age: 63
End: 2023-10-05
Payer: COMMERCIAL

## 2023-10-05 ENCOUNTER — OFFICE VISIT (OUTPATIENT)
Dept: PODIATRY | Facility: CLINIC | Age: 63
End: 2023-10-05
Payer: COMMERCIAL

## 2023-10-05 VITALS
HEIGHT: 59 IN | DIASTOLIC BLOOD PRESSURE: 71 MMHG | SYSTOLIC BLOOD PRESSURE: 108 MMHG | BODY MASS INDEX: 31.11 KG/M2 | WEIGHT: 154.31 LBS

## 2023-10-05 DIAGNOSIS — M76.72 PERONEAL TENDINITIS OF LEFT LOWER EXTREMITY: ICD-10-CM

## 2023-10-05 DIAGNOSIS — M65.9 SYNOVITIS OF LEFT ANKLE: Primary | ICD-10-CM

## 2023-10-05 DIAGNOSIS — M79.672 LEFT FOOT PAIN: Primary | ICD-10-CM

## 2023-10-05 PROCEDURE — 3044F PR MOST RECENT HEMOGLOBIN A1C LEVEL <7.0%: ICD-10-PCS | Mod: CPTII,S$GLB,, | Performed by: PODIATRIST

## 2023-10-05 PROCEDURE — 99213 PR OFFICE/OUTPT VISIT, EST, LEVL III, 20-29 MIN: ICD-10-PCS | Mod: 25,S$GLB,, | Performed by: PODIATRIST

## 2023-10-05 PROCEDURE — 3078F PR MOST RECENT DIASTOLIC BLOOD PRESSURE < 80 MM HG: ICD-10-PCS | Mod: CPTII,S$GLB,, | Performed by: PODIATRIST

## 2023-10-05 PROCEDURE — 3074F SYST BP LT 130 MM HG: CPT | Mod: CPTII,S$GLB,, | Performed by: PODIATRIST

## 2023-10-05 PROCEDURE — 3078F DIAST BP <80 MM HG: CPT | Mod: CPTII,S$GLB,, | Performed by: PODIATRIST

## 2023-10-05 PROCEDURE — 3074F PR MOST RECENT SYSTOLIC BLOOD PRESSURE < 130 MM HG: ICD-10-PCS | Mod: CPTII,S$GLB,, | Performed by: PODIATRIST

## 2023-10-05 PROCEDURE — 1160F PR REVIEW ALL MEDS BY PRESCRIBER/CLIN PHARMACIST DOCUMENTED: ICD-10-PCS | Mod: CPTII,S$GLB,, | Performed by: PODIATRIST

## 2023-10-05 PROCEDURE — 1159F PR MEDICATION LIST DOCUMENTED IN MEDICAL RECORD: ICD-10-PCS | Mod: CPTII,S$GLB,, | Performed by: PODIATRIST

## 2023-10-05 PROCEDURE — 99213 OFFICE O/P EST LOW 20 MIN: CPT | Mod: 25,S$GLB,, | Performed by: PODIATRIST

## 2023-10-05 PROCEDURE — 1160F RVW MEDS BY RX/DR IN RCRD: CPT | Mod: CPTII,S$GLB,, | Performed by: PODIATRIST

## 2023-10-05 PROCEDURE — 20605 DRAIN/INJ JOINT/BURSA W/O US: CPT | Mod: LT,S$GLB,, | Performed by: PODIATRIST

## 2023-10-05 PROCEDURE — 3008F BODY MASS INDEX DOCD: CPT | Mod: CPTII,S$GLB,, | Performed by: PODIATRIST

## 2023-10-05 PROCEDURE — 3008F PR BODY MASS INDEX (BMI) DOCUMENTED: ICD-10-PCS | Mod: CPTII,S$GLB,, | Performed by: PODIATRIST

## 2023-10-05 PROCEDURE — 1159F MED LIST DOCD IN RCRD: CPT | Mod: CPTII,S$GLB,, | Performed by: PODIATRIST

## 2023-10-05 PROCEDURE — 3044F HG A1C LEVEL LT 7.0%: CPT | Mod: CPTII,S$GLB,, | Performed by: PODIATRIST

## 2023-10-05 PROCEDURE — 20605 INTERMEDIATE JOINT ASPIRATION/INJECTION: ICD-10-PCS | Mod: LT,S$GLB,, | Performed by: PODIATRIST

## 2023-10-05 PROCEDURE — 99999 PR PBB SHADOW E&M-EST. PATIENT-LVL IV: CPT | Mod: PBBFAC,,, | Performed by: PODIATRIST

## 2023-10-05 PROCEDURE — 99999 PR PBB SHADOW E&M-EST. PATIENT-LVL IV: ICD-10-PCS | Mod: PBBFAC,,, | Performed by: PODIATRIST

## 2023-10-05 RX ORDER — DEXAMETHASONE SODIUM PHOSPHATE 4 MG/ML
4 INJECTION, SOLUTION INTRA-ARTICULAR; INTRALESIONAL; INTRAMUSCULAR; INTRAVENOUS; SOFT TISSUE
Status: DISCONTINUED | OUTPATIENT
Start: 2023-10-05 | End: 2023-10-05 | Stop reason: HOSPADM

## 2023-10-05 RX ADMIN — DEXAMETHASONE SODIUM PHOSPHATE 4 MG: 4 INJECTION, SOLUTION INTRA-ARTICULAR; INTRALESIONAL; INTRAMUSCULAR; INTRAVENOUS; SOFT TISSUE at 09:10

## 2023-10-05 NOTE — PROGRESS NOTES
[facility]  [address]  [address]  [place] [address]  Dept: [phone]  Dept Fax: [phone]    Rodney Orozco Jr., DPM     Assessment:   MDM    Coding  1. Synovitis of left ankle  Intermediate Joint Aspiration/Injection      2. Peroneal tendinitis of left lower extremity            Plan:     Intermediate Joint Aspiration/Injection    Date/Time: 10/5/2023 9:15 AM    Performed by: Rodney Orozco Jr., DPM  Authorized by: Rodney Orozco Jr., DPM    Consent Done?:  Yes (Verbal)  Indications:  Pain and joint swelling  Site marked: The procedure site was marked    Timeout: Prior to procedure the correct patient, procedure, and site was verified      Location:  Ankle  Ankle joint: L STJ.  Prep: Patient was prepped and draped in usual sterile fashion    Ultrasonic Guidance for needle placement: No  Needle size:  25 G  Approach:  Anterolateral  Medications:  4 mg dexAMETHasone 4 mg/mL  Patient tolerance:  Patient tolerated the procedure well with no immediate complications      1. Synovitis of left ankle  -     Intermediate Joint Aspiration/Injection    2. Peroneal tendinitis of left lower extremity      Kalpana was seen today for ankle pain.    Diagnoses and all orders for this visit:    Synovitis of left ankle  -     Intermediate Joint Aspiration/Injection    Peroneal tendinitis of left lower extremity        -pt seen, evaluated, and managed  -dx discussed in detail. All questions/concerns addressed  -all tx options discussed. All alternatives, risks, benefits of all txs discussed  -We discussed conservative care options possible including but not limited to shoe wear and/or padding, bracing/strapping, at home ROM, formal PT, medical therapy, injection therapy  - The utilization of NSAIDs can be considered but their benefit has to be tempered against the risk of GI/ concerns  - A steroid injection can be undertaken.  We did discuss the potential mechanism of action of this  shot.  Understanding that multiple injections at the same anatomic site do have deleterious effects on the soft tissue.  Generic risks include: steroid flare (advised to ice if necessary), skin hypo-pgimentation (which can be permanent and unsightly), elevation of blood sugar, subcutaneous atrophy (can be permanent) and infection.   -XR/imaging reviewed by me: agree with read  -labs reviewed by me: ok for vgel  -implemented icing/stretching regimen  - CAM boot  dispensed    -rxs dispensed: none  -referrals: none  -WB: [facility] in [place]      Follow up in about 6 weeks (around 2023).    Subjective:      Patient ID: Kalpana Rivera is a 62 y.o. female.    Chief Complaint:   Chief Complaint   Patient presents with    Ankle Pain     Left ankle swelling       CC - foot pain: patient presents to the podiatry clinic  with complaint of  left foot pain. Onset of the symptoms was several months ago. Precipitating event: unk. Current symptoms include: ability to bear weight, but with some pain, stiffness, swelling and worsening symptoms after a period of activity. Aggravating factors: walking and certain shoegear. Symptoms have gradually worsened. Patient has had no prior foot problems. Evaluation to date: none. Treatment to date: avoidance of offending activity. Patients rates pain 6/10 on pain scale.        Ankle Pain         Last Podiatry Enc: Visit date not found  Last Enc w/ Me: Visit date not found    Outside reports reviewed: historical medical records.  Family hx: as below  Past Medical History:   Diagnosis Date    Diabetes mellitus, type 2      Past Surgical History:   Procedure Laterality Date    CARPAL TUNNEL RELEASE       SECTION      2    left thumb      right middle finger      right shoulder       Family History   Problem Relation Age of Onset    Diabetes Mother     Parkinsonism Mother     Alzheimer's disease Mother     Diabetes Father     Cancer Father      Current Outpatient Medications  "  Medication Sig Dispense Refill    blood-glucose sensor (DEXCOM G7 SENSOR) Brittany To change every 10 days 3 each 3    blood-glucose transmitter (DEXCOM G6 TRANSMITTER) Brittany 2 Devices by Misc.(Non-Drug; Combo Route) route every 6 (six) months. 1 each 3    cetirizine (ZYRTEC) 10 MG tablet Take 10 mg by mouth once daily.      fluticasone propionate (FLONASE) 50 mcg/actuation nasal spray 1 spray by Each Nostril route once daily.      insulin aspart U-100 (NOVOLOG FLEXPEN U-100 INSULIN) 100 unit/mL (3 mL) InPn pen INJECT 3 UNITS WITH BREAKFAST, 5 UNITS WITH LUNCH, AND 5 UNITS WITH DINNER, INJECT 1-2 UNITS WITH SNACKS. 45 mL 1    Lactobacillus rhamnosus GG (CULTURELLE) 10 billion cell capsule Take 1 capsule by mouth once daily.      metFORMIN (GLUCOPHAGE-XR) 500 MG ER 24hr tablet Take 2 tablets (1,000 mg total) by mouth 2 (two) times daily with meals. 360 tablet 1    multivitamin with minerals tablet Take 1 tablet by mouth once daily.      pen needle, diabetic (BD ULTRA-FINE SHORT PEN NEEDLE) 31 gauge x 5/16" Ndle USE TO INJECT INSULIN FOUR TIMES A  each 1    pravastatin (PRAVACHOL) 10 MG tablet Take 10 mg by mouth every evening.      tirzepatide (MOUNJARO) 5 mg/0.5 mL PnIj INJECT 5 MG INTO THE SKIN EVERY 7 DAYS 4 pen 4    TRESIBA FLEXTOUCH U-200 200 unit/mL (3 mL) insulin pen       TURMERIC ORAL Take by mouth.      UNABLE TO FIND Cranberry & Buchu natural supplement      UNABLE TO FIND Nerve Eight natural supplement      UNABLE TO FIND [place] natural supplement      VITAMIN B COMPLEX ORAL Take 1 tablet by mouth.       No current facility-administered medications for this visit.     Review of patient's allergies indicates:   Allergen Reactions    Azithromycin Anaphylaxis     Social History     Socioeconomic History    Marital status:    Tobacco Use    Smoking status: Never    Smokeless tobacco: Never   Substance and Sexual Activity    Alcohol use: Yes    Drug use: Never       ROS    REVIEW OF SYSTEMS: " "Negative as documented below as well as positive findings in bold.       Constitutional  Respiratory  Gastrointestinal  Skin   - Fever - Cough - Heartburn - Rash   - Chills - Spit blood - Nausea - Itching   - Weight Loss - Shortness of breath - Vomiting - Nail pain   - Malaise/Fatigue - Wheezing - Abdominal Pain  Wound/Ulcer   - Weight Gain   - Blood in Stool  Poor wound healing       - Diarrhea          Cardiovascular  Genitourinary  Neurological  HEENT   - Chest Pain - Dysuria - Burning Sensation of feet - Headache   - Palpitations - Hematuria - Tingling / Paresthesia - Congestion   - Pain at night in legs - Flank Pain - Dizziness - Sore Throat   - Cramping   - Tremor - Blurred Vision   - Leg Swelling   - Sensory Change - Double Vision   - Dizzy when standing   - Speech Change - Eye Redness       - Focal Weakness - Dry Eyes       - Loss of Consciousness          Endocrine  Musculoskeletal  Psychiatric   - Cold intolerance - Muscle Pain - Depression   - Heat intolerance - Neck Pain - Insomnia   - Anemia - Joint Pain - Memory Loss   -  Easy bruising, bleeding - Heel pain - Anxiety      Toe Pain        Leg/Ankle/Foot Pain         Objective:     /71 (BP Location: Left arm, Patient Position: Sitting, BP Method: Medium (Automatic))   Ht 4' 11" (1.499 m)   Wt 70 kg (154 lb 5.2 oz)   BMI 31.17 kg/m²   Vitals:    10/05/23 0940   BP: 108/71   Weight: 70 kg (154 lb 5.2 oz)   Height: 4' 11" (1.499 m)   PainSc:   5   PainLoc: Foot       Physical Exam    General Appearance:   Patient appears well developed, well nourished  Patient appears stated age    Psychiatric:   Patient is oriented to time, place, and person.  Patient has appropriate mood and affect    Neck:  Trachea Midline  No visible masses    Respiratory/Ears:  No distress or labored breathing.  Able to differentiate between normal talking voice and whisper.  Able to follow commands    Eyes:  Visual Acuity intact  Lids and conjunctivae normal. No discoloration " noted.    Foot Exam  Physical Exam  Ortho Exam  Ortho/SPM Exam  Physical Exam  Foot/Ankle Musculoskeletal Exam    L LE exam con't:  V:  DP 2/4, PT 2/4   CRT< 3s to all digits tested   Tibial and popliteal lymph nodes are w/o abnormality   Edema: absent, varicosities: present    N:  Patient displays normal ankle reflexes   SILT in SP/DP/T/Cody/Saph distributions    Ortho: +Motor EHL/FHL/TA/GA   No major/gross deformity present   There is no decreased ROM at the ankle or ST joints: pain is not present, crepitus is not present  There is moderate pain with palpation of PL/PB in retromal area  +TTP CFL - no instability present  Compartments soft/compressible. No pain on passive stretch of big toe. No calf  Pain.    Derm:  skin intact, skin warm and dry, skin without ulcers or lesions, skin without induration, nails normal, no erythema and no ecchymosis    Imaging / Labs:      X-Ray Ankle Complete Left    Result Date: 10/5/2023  EXAM: XR ANKLE COMPLETE 3 VIEW LEFT CLINICAL HISTORY: [M79.672]-Pain in left foot. FINDINGS: 3 views of the left ankle.  No acute fracture or dislocation.  Ankle mortise is maintained.  No significant degenerative changes. IMPRESSION: No acute finding. Finalized on: 10/5/2023 9:35 AM By:  Reynaldo Marmolejo MD [facility]# [id number]      2023-10-05 09:[id number]    [facility]        Note: This was dictated using a computer transcription program. Although proofread, it may contain computer transcription errors and phonetic errors. Other human proofreading errors may also exist. Corrections may be performed at a later time. Please contact us for any clarification if needed.    Rodney Orozco DPM  [facility]

## 2023-10-05 NOTE — PATIENT INSTRUCTIONS
Joint Pain in the Foot  The foot contains 26 bones and more than 30 joints. Many people experience pain involving one or more of these joints. The pain may be accompanied by swelling, tenderness, stiffness, redness, bruising and/or increased warmth over the affected joints.    Joint pain may be caused by trauma, infection, inflammation, arthritis, bursitis, gout or structural foot problems. It is initially treated with rest, elevation and limitation of walking/weightbearing on the painful foot. Use of nonsteroidal anti-inflammatory drugs (NSAIDs), such as ibuprofen, and ice can help reduce local inflammation and pain. Custom orothotic devices may also be prescribed to support the foot and reduce pain. A foot and ankle surgeon can best determine the cause of joint pain and recommend the appropriate treatment.      Joint Swelling in the Foot  The foot contains 26 bones and more than 30 joints. The body's natural response to any type of joint injury is to increase blood flow to the affected area. This results in an accumulation of fluid in the tissues in and around the joint, resulting in swelling. Depending on the cause of the injury, joint swelling may be accompanied by stiffness, redness, warmth and pain.    Joint swelling and pain can also be referred to as capsulitis, bursitis or synovitis.    Joint swelling may also result from inflammatory, degenerative, traumatic, infective or crystal-forming joint diseases, such as gout. If joint swelling persists, a foot and ankle surgeon can best determine the cause and recommend the appropriate treatment.    Synovitis  Synovitis is an inflammation of the tissues that line a joint. It is commonly associated with specific diseases, such as arthritis or gout, but it may also be the result of overuse or trauma. Symptoms of synovitis may include redness, swelling, warmth and pain with joint motion.    Evaluation by a foot and ankle surgeon will help confirm the diagnosis and  will help rule out other possible concerns, such as fractures or infections. The surgeon may sample fluid from the joint to analyze for inflammatory cells or may order x-rays or other advanced imaging tests to better evaluate the affected joint.    Treatment for synovitis includes rest, ice, immobilization and oral nonsteroidal anti-inflammatory drugs (NSAIDs), such as ibuprofen, and may include steroid injections into the joint. Surgery may be indicated in longstanding cases.      What Is Capsulitis    Ligaments surrounding the joint form a capsule, which helps the joint to function properly. Capsulitis is a condition in which these ligaments have become inflamed. Capsulitis can also occur in the most any joint in the foot and ankle. This inflammation causes considerable discomfort and, if left untreated, can eventually lead to a weakening of surrounding ligaments that can cause dislocation.     Symptoms  Because capsulitis can be a progressive disorder and usually worsens if left untreated, early recognition and treatment are important. In the earlier stages--the best time to seek treatment--the symptoms may include:    Pain  Swelling in the area of pain  Difficulty wearing shoes  Pain when walking barefoot     In more advanced stages, the supportive ligaments weaken, leading to failure of the joint to stabilize. The unstable joint can lead to other problems, such as repetitive injury to the cartilage of a joint.    Diagnosis  In arriving at a diagnosis, the foot and ankle surgeon will examine the foot, press on it and maneuver it to reproduce the symptoms. The surgeon will also look for potential causes and test the stability of the joint. X-rays are usually ordered, and other imaging studies are sometimes needed.    Nonsurgical Treatment  The best time to treat capsulitis is during the early stages. At that time, nonsurgical approaches can be used to stabilize the joint, reduce the symptoms and address the  underlying cause of the condition.    The foot and ankle surgeon may select one or more of the following options for early treatment of capsulitis:    Rest and ice. Staying off the foot and applying ice packs help reduce the swelling and pain. Apply an ice pack, placing a thin towel between the ice and the skin. Use ice for 20 minutes and then wait at least 40 minutes before icing again.  Oral medications. Nonsteroidal anti-inflammatory drugs (NSAIDs), such as ibuprofen, may help relieve the pain and inflammation.  Taping/splinting. It may be necessary to tape the toe so that it will stay in the correct position. This helps relieve the pain and prevent further drifting of the toe.  Stretching. Stretching exercises may be prescribed for patients who have tight calf muscles.  Shoe modifications. Supportive shoes with stiff soles are recommended because they control the motion and lessen the amount of pressure on the ball of the foot.  Orthotic devices. Custom shoe inserts are often very beneficial. These include arch supports or a metatarsal pad that distributes the weight away from the joint.     When Is Surgery Needed?  Once the second toe starts moving toward the big toe, it will never go back to its normal position unless surgery is performed. The foot and ankle surgeon will select the procedure or combination of procedures best suited to the individual patient.    Nonsurgical Treatment  When you have joint inflammation or swelling, rehabilitation is crucial--and it starts the moment your treatment begins. Your foot and ankle surgeon may recommend one or more of the following treatment options:      -Rest. Stay off the injured ankle. Walking may cause further injury.  -Ice. Apply an ice pack to the injured area, placing a thin towel between the ice and the skin. Use ice for 20 minutes and then wait at least 40 minutes before icing again.  -Compression. An elastic wrap may be recommended to control  swelling.  -Elevation. The ankle should be raised slightly above the level of your heart to reduce swelling.  -Early physical therapy. Your doctor will start you on a rehabilitation program as soon as possible to promote healing and increase your range of motion. This includes doing prescribed exercises.  -Medications. Nonsteroidal anti-inflammatory drugs (NSAIDs), such as ibuprofen, may be recommended to reduce pain and inflammation. In some cases, prescription pain medications are needed to provide adequate relief.  -Immobilization. A period of limited or no weight bearing on the injured extremity.     When Is Surgery Needed?  In more severe cases, surgery may be required to adequately treat an injury to the foot or ankle. Surgery often involves repairing the damaged structures, ligament or ligaments. The foot and ankle surgeon will select the surgical procedure best suited for your case based on the type and severity of your injury as well as your activity level.    After surgery, rehabilitation is extremely important. Completing your rehabilitation program is crucial to a successful outcome. Be sure to continue to see your foot and ankle surgeon during this period to ensure that your ankle heals properly and function is restored.      R.I.C.E. Protocol      R.I.C.E. stands for Rest, Ice, Compression, and Elevation. Doing these things helps limit pain and swelling after an injury. R.I.C.E. also helps injuries heal faster. Use R.I.C.E. for sprains, strains, and severe bruises, joint pain/swelling, or foot/ankle inflammation. Follow the tips on this handout and begin R.I.C.E. as soon as possible after an injury.  []   Rest  Pain is your body's way of telling you to rest an injured area. Whether you have hurt an elbow, hand, foot, or knee, limiting its use will prevent further injury and help you heal.  []   Ice  Applying ice right after an injury helps prevent swelling and reduce pain. Don't place ice directly on  your skin.  Wrap a cold pack or bag of ice in a thin cloth. Place it over the injured area.  Ice for 10 minutes every 3 hours. Don't ice for more than 20 minutes at a time.  []   Compression  Putting pressure (compression) on an injury helps prevent swelling and provides support.  Wrap the injured area firmly with an elastic bandage. If your hand or foot tingles, becomes discolored, or feels cold to the touch, the bandage may be too tight. Rewrap it more loosely.  If your bandage becomes too loose, rewrap it.  Do not wear an elastic bandage overnight.  []   Elevation  Keeping an injury elevated helps reduce swelling, pain, and throbbing. Elevation is most effective when the injury is kept elevated higher than the heart.     Call your healthcare provider if you notice any of the following:  Fingers or toes feel numb, are cold to the touch, or change color  Skin looks shiny or tight  Pain, swelling, or bruising worsens and is not improved with elevation     Peroneal Tendon Injuries  What Are the Peroneal Tendons?         A tendon is a band of tissue that connects a muscle to a bone. The two peroneal tendons in the foot run side by side behind the outer ankle bone. One peroneal tendon attaches to the outer part of the midfoot, while the other tendon runs under the foot and attaches near the inside of the arch. The main function of the peroneal tendons is to stabilize the foot and ankle and protect them from sprains.    Causes & Symptoms of Peroneal Tendon Injuries  Peroneal tendon injuries may be acute (occurring suddenly) or chronic (developing over a period of time). They most commonly occur in individuals who participate in sports that involve repetitive ankle motion. In addition, people with higher arches are at risk for developing peroneal tendon injuries. Basic types of peroneal tendon injuries are tendonitis, tears and subluxation.    Tendonitis is an inflammation of one or both tendons. The inflammation is  caused by activities involving repetitive use of the tendon, overuse of the tendon or trauma (such as an ankle sprain). Symptoms of tendonitis include:    Pain  Swelling  Warm to the touch     Acute tears are caused by repetitive activity or trauma. Immediate symptoms of acute tears include:    Pain  Swelling  Weakness or instability of the foot and ankle     As time goes on, these tears may lead to a change in the shape of the foot in which the arch may become higher.    Degenerative tears (tendonosis) are usually due to overuse and occur over long periods of time, often years. In degenerative tears, the tendon is like taffy that has been overstretched until it becomes thin and eventually frays. Having high arches also puts you at risk for developing a degenerative tear. The symptoms of degenerative tears may include:    Sporadic pain (occurring from time to time) on the outside of the ankle  Weakness or instability in the ankle  An increase in the height of the arch     Subluxation means one or both tendons have slipped out of their normal position. In some cases, subluxation is due to a condition in which a person is born with a variation in the shape of the bone or muscle. In other cases, subluxation occurs following trauma, such as an ankle sprain. Damage or injury to the tissues that stabilize the tendons (retinaculum) can lead to chronic tendon subluxation. The symptoms of subluxation may include:    A snapping feeling of the tendon around the ankle bone  Sporadic pain behind the outside ankle bone  Ankle instability or weakness     Early treatment of a subluxation is critical since a tendon that continues to sublux (move out of position) is more likely to tear or rupture. Therefore, if you feel the characteristic snapping, see a foot and ankle surgeon immediately.    Diagnosis  Because peroneal tendon injuries are sometimes misdiagnosed and may worsen without proper treatment, prompt evaluation by a foot and  ankle surgeon is advised. To diagnose a peroneal tendon injury, the surgeon will examine the foot and look for pain, instability, swelling, warmth and weakness on the outer side of the ankle. In addition, an x-ray or other advanced imaging studies may be needed to fully evaluate the injury. The foot and ankle surgeon will also look for signs of an ankle sprain and other related injuries that sometimes accompany a peroneal tendon injury. Proper diagnosis is important because prolonged discomfort after a simple sprain may be a sign of additional problems.    Nonsurgical Treatment  Treatment depends on the type of peroneal tendon injury. Options include:    Immobilization. A cast or splint may be used to keep the foot and ankle from moving and allow the injury to heal.  Medications. Oral or injected anti-inflammatory drugs may help relieve pain and inflammation.  Physical therapy. Ice, heat or ultrasound therapy may be used to reduce swelling and pain. As symptoms improve, exercises can be added to strengthen the muscles and improve range of motion and balance.  Bracing. The surgeon may provide a brace to use for a short while or during activities requiring repetitive ankle motion. Bracing may also be an option when a patient is not a candidate for surgery.     When Is Surgery Needed?  In some cases, surgery may be needed to repair the tendon or tendons and perhaps the supporting structures of the foot. The foot and ankle surgeon will determine the most appropriate procedure for the patients condition and lifestyle. After surgery, physical therapy is an important part of rehabilitation.      What is Tendonitis of the Foot?  When you use a set of muscles too much, youre likely to strain the tendons (soft tissues) that connect those muscles to your bones. At first, pain or swelling may come and go quickly. But if you do too much too soon, your muscles may overtire again. The strain may cause a tendons outer covering  to swell or small fibers in a tendon to pull apart. If you keep pushing your muscles, damage to the tendons adds up, and tendonitis develops. Over time, pain and swelling may limit your activities. But with your doctors help, tendonitis can be controlled. Both your symptoms and your risk of future problems including tendon rupture can be reduced.       The back of your foot  The Achilles tendon connects the calf muscle to the heel bone. If tendonitis occurs here, you may feel pain when your foot touches down or when your heel lifts off the ground.   The front of your foot  The anterior tibial tendon helps control the front of your foot when it meets the ground. If this tendon is strained, you may feel pain when you go down stairs or walk or run on hills.     The inside of your foot  The posterior tibial tendon runs along the inside of the ankle and foot. If this tendon is strained, your foot may hurt when it moves forward to push off the ground. Or you may feel pain when your heel shifts from side to side.   The outside of your foot  The peroneal tendon wraps across the bottom of your foot, from the outside to the inside. Tendonitis here may cause pain when you stand or push off the ground and when walking on uneven surfaces.   Date Last Reviewed: 9/21/2015  © 1429-3377 [employer]. [address] [place] [address], [place] [address]. All rights reserved. This information is not intended as a substitute for professional medical care. Always follow your healthcare professional's instructions.        Treating Tendonitis of the Foot  Your healthcare provider's first concern is to reduce your symptoms. Using ice and heat, taking medicines, and limiting activity help control pain and swelling. Follow all of your healthcare provider's instructions. Returning to activity too soon may cause your symptoms to come back.    Ice and heat  Ice helps prevent swelling and reduce pain. Place ice on the painful area for 10 to  15 minutes. Repeat the icing several times a day. If ?you have had the problem for a while, using heat may help. Apply a heating pad or hot towels to the tendon for 20 to 30 minutes 2 or 3 times a day.  Medicines  Your healthcare provider may tell you to take ibuprofen or other anti-inflammatory medicines. These reduce pain and swelling. Take them as directed. Dont wait until you feel pain. In more severe cases, cortisone may be injected to relieve pain.  Limiting activities  Rest allows the tissues in your foot to heal. Stay off your feet for a few days, then slowly work back into activity. If you do high-impact activities, such as running or aerobics, try other activities that place less strain on your foot. Cycling and swimming are good choices.  Date Last Reviewed: 9/21/2015  © 1190-4805 [employer]. [address] [place] [address], [place] [address]. All rights reserved. This information is not intended as a substitute for professional medical care. Always follow your healthcare professional's instructions.

## 2023-10-30 NOTE — PROGRESS NOTES
Subjective:      Patient ID: Kalpana Rivera is a 62 y.o. female.    Chief Complaint:  No chief complaint on file.    History of Present Illness  Kalpana Rivera presents today for follow up of JULIANN now managed as type 1 diabetic-insulin dependent. Previous patient of Dr. Zeng and myself. Last seen in May 2023     Ref. Range 9/14/2020 07:45   Glucose Latest Ref Range: 65 - 99 mg/dL 154 (H)      Latest Reference Range & Units 09/14/20 07:45   C-Peptide 0.80 - 3.85 ng/mL 0.10 (L)   (L): Data is abnormally low   Latest Reference Range & Units 09/14/20 07:45   Glutamate decarboxylase 65 Ab <5 IU/mL 97 (H)   (H): Data is abnormally high    She is in left boot today due to fall in April 2023. She will need to be in ankle brace and do PT.    Started using Mounjaro in June 2023 when she ran out of current supply of Trulicity. She is using dexcom G7 and likes it but wearing on abdomen.     She has reduced her tresiba incrementally due to low blood sugars since starting Mounjaro and noticing hypoglycemia overnight. She has lost 15 pounds on Mounjaro since starting.     Wt Readings from Last 3 Encounters:   11/02/23 0827 70 kg (154 lb 5.2 oz)   10/05/23 0940 70 kg (154 lb 5.2 oz)   07/07/23 0829 76 kg (167 lb 7 oz)     With regards to the diabetes:    Diagnosed with diabetes age 24; diagnosed as JULIANN in 2020  Started insulin during first pregnancy and able to come off. In 1992 had son and has been on insulin since that time  Nephew is type 1  Family History of Type 2 DM: mother and father  Known complications:  DKA/HHS: twice in the past  RN: +; needs eye exam   PN: +  Nephropathy: -; due in Aug 2023  Gastroparesis: denies  CAD: denies    We prescribed INPEN but had issues with cartridges and expiration of INPEN so we decided to hold off at this time.     Current regimen:  Tresiba 16 units daily   Mounjaro 5 mg weekly  Metformin 1000 mg twice daily   Novolog  - reports she has been able to skip novolog dose occ at  breakfast or dinner. She is taking novolog at least 2 times daily.                                  Breakfast:          2-4 Units              Lunch:               2-5 Units              Dinner:               2-5 Units    States she changes her novolog based on what she is eating at times; sometimes reduces    Has been taking novolog 10-15 minutes prior to a meal most days. Takes right before a meal if her BG is <110.  States she is afraid to give insulin at times before a meal because she is afraid she will drop- takes senior living through meal if her BG <100 (states this occurs 3 times a month). She is changing needle every time and rotating injection sites.     Missed doses-denies     Other medications tried:  Trulicity 1.5 mg weekly- very low appetite so stopped  levemir    4 times daily  See media tab.   She loves the dexcom.       Knows how to correct with 15 grams of carbs- juice, coke, or a peppermint.     Dietary recall: She feels that she is following diabetic diet  Eats 3 meals a day. States that she has noticed portion sizes decreased because she is not as hungry.  B: greek yogurt with splenda or protein shake -premier (7-7:30)  L: healthy choice or grilled cheese sandwich (12-1)  D: same as lunch (6:30-7)   She does not cook often but has some frozen foods.   She has been working from home.   Snacks: not really snacking as much anymore; pickles and cashews; carrots; not normally snacking after dinner  Drinks: water, green tea, and diet luli  Had box of raisins before     Exercise -She stopped PT but will resume again.    Education - last visit: 2/2023    Has a Medic alert tag-has bracelet   Glucagon/Baqsimi-declines; lives alone    Diabetes Management Status  Statin: Not taking  ACE/ARB: Not taking -stopped due to diarrhea    Lab Results   Component Value Date    HGBA1C 6.4 (H) 10/06/2023    HGBA1C 6.4 (H) 05/30/2023    HGBA1C 6.8 (H) 01/30/2023     Screening or Prevention Patient's value Goal  "Complete/Controlled?   HgA1C Testing and Control   Lab Results   Component Value Date    HGBA1C 6.4 (H) 10/06/2023      Annually/Less than 8% Yes   Lipid profile : 10/06/2023 Annually Yes   LDL control Lab Results   Component Value Date    LDLCALC 61.4 (L) 10/06/2023    Annually/Less than 100 mg/dl  Yes   Nephropathy screening Lab Results   Component Value Date    LABMICR 17.0 10/06/2023     No results found for: "PROTEINUA" Annually No   Blood pressure BP Readings from Last 1 Encounters:   11/02/23 120/78    Less than 140/90 Yes   Dilated retinal exam : 06/30/2023 Annually Yes   Foot exam   : 01/30/2023 Annually Yes     With regards to thyroid nodule,     Remembers being told in the past that she had a thyroid nodule and needed biopsy which she did not do.     Denies hoarseness or dysphagia.     No radiation to head or neck. No history of thyroid cancer in family.     Thyroid US 4/5/23  Impression:     1.  Thyroid gland is normal in size with homogenous echotexture.     2.  1 nodule in the right thyroid described above.  It does not meet criteria for fine-needle aspiration.     RECOMMENDATIONS:  Follow-up ultrasound in 2 years is recommended.  RECOMMENDATIONS:  Repeat thyroid ultrasound in 2 years.    Lab Results   Component Value Date    TSH 2.069 01/30/2023     With regards to dyslipidemia,     Was prescribed pravastatin 10 mg daily but stopped due to diarrhea then resumed and taking a couple of days a week.   Latest Reference Range & Units 10/06/23 08:33   Cholesterol Total 120 - 199 mg/dL 140   HDL 40 - 75 mg/dL 70   HDL/Cholesterol Ratio 20.0 - 50.0 % 50.0   LDL Cholesterol External 63.0 - 159.0 mg/dL 61.4 (L)   Non-HDL Cholesterol mg/dL 70   Total Cholesterol/HDL Ratio 2.0 - 5.0  2.0   Triglycerides 30 - 150 mg/dL 43   (L): Data is abnormally low    Of note, she had elevated CO2 --declines sleep study at this time.    Review of Systems   Constitutional:  Negative for fatigue and unexpected weight change. " "  Eyes:  Negative for visual disturbance.   Endocrine: Negative for polydipsia, polyphagia and polyuria.   Neurological:  Positive for numbness (left leg).     Objective:   Physical Exam  Constitutional:       Appearance: Normal appearance.   Pulmonary:      Effort: Pulmonary effort is normal.   Neurological:      Mental Status: She is alert.     Denies injection site edema or erythema. No lipo hypertropthy or atrophy  Diabetes Foot Exam:  Feet no cuts or scratches  Shoes appropriate  DM foot exam deferred; done in Jan 2023    Visit Vitals  /78 (BP Location: Left arm, Patient Position: Sitting, BP Method: Medium (Manual))   Pulse 97   SpO2 97%     Wt Readings from Last 3 Encounters:   11/02/23 0827 70 kg (154 lb 5.2 oz)   10/05/23 0940 70 kg (154 lb 5.2 oz)   07/07/23 0829 76 kg (167 lb 7 oz)     Lab Review:   Lab Results   Component Value Date    HGBA1C 6.4 (H) 10/06/2023    HGBA1C 6.4 (H) 05/30/2023    HGBA1C 6.8 (H) 01/30/2023      Lab Results   Component Value Date    CHOL 140 10/06/2023    HDL 70 10/06/2023    LDLCALC 61.4 (L) 10/06/2023    TRIG 43 10/06/2023    CHOLHDL 50.0 10/06/2023     Lab Results   Component Value Date     10/06/2023    K 3.9 10/06/2023    CL 99 10/06/2023    CO2 28 10/06/2023     (H) 10/06/2023    BUN 10 10/06/2023    CREATININE 0.57 10/06/2023    CALCIUM 9.5 10/06/2023    PROT 7.4 10/06/2023    ALBUMIN 4.5 10/06/2023    BILITOT 0.7 10/06/2023    ALKPHOS 58 10/06/2023    AST 27 10/06/2023    ALT 27 10/06/2023    ANIONGAP 13 10/06/2023    ESTGFRAFRICA >60.0 03/28/2022    EGFRNONAA >60.0 03/28/2022    TSH 2.069 01/30/2023     No results found for: "[id number]"  Assessment and Plan     1. Type 2 diabetes mellitus with both eyes affected by moderate nonproliferative retinopathy without macular edema, with long-term current use of insulin  Comprehensive Metabolic Panel    Hemoglobin A1C    Ambulatory referral/consult to Ophthalmology    Ambulatory referral/consult to ENT "      2. Thyroid nodule  TSH      3. Class 1 obesity due to excess calories with serious comorbidity and body mass index (BMI) of 33.0 to 33.9 in adult          Type 2 diabetes mellitus with both eyes affected by moderate nonproliferative retinopathy without macular edema, with long-term current use of insulin  -- Reviewed goals of therapy are to get the best control we can without hypoglycemia  -- Treat as type 1 diabetic.   A1c at goal   Dexcom reviewed : blood sugars are at goal, higher overnight the last couple of days  Continue  Metformin 1000 mg twice daily   Continue Mounjaro 5 mg weekly  Decrease Tresiba to 17 units daily   Continue  Novolog                Breakfast:          2-4 Units              Lunch:               2-5 Units              Dinner:              2-5 Units     -- Please try to give insulin at least 10 minutes before a meal.   -- Reviewed patient's current insulin regimen. Clarified proper insulin dose and timing in relation to meals, etc. Insulin injection sites and proper rotation instructed.    -- Advised frequent self blood glucose monitoring.  Patient encouraged to document glucose results and bring them to every clinic visit    -- Hypoglycemia precautions discussed. Instructed on precautions before driving.    -- Call for Bg repeatedly < 90 or > 180.   -- Close adherence to lifestyle changes recommended.   -- Periodic follow ups for eye evaluations, foot care and dental care suggested.  -- Given information on diabetic snacks and hypoglycemia previously  Cholesterol at goal without medications    Thyroid nodule  -- Small stable nodule on US in 2022  -- Denies compressive symptoms  -- Repeat thyroid US in 2024    Class 1 obesity due to excess calories with serious comorbidity and body mass index (BMI) of 33.0 to 33.9 in adult  Continue Mounjaro as above  Continue weight loss  There is no height or weight on file to calculate BMI.     Continue pravastatin 10 mg a couple of days a week   LDL  at goal       Follow up in about 4 months (around 3/2/2024).  Labs prior to visit

## 2023-11-01 NOTE — ASSESSMENT & PLAN NOTE
Continue Mounjaro as above  Continue weight loss  There is no height or weight on file to calculate BMI.

## 2023-11-01 NOTE — ASSESSMENT & PLAN NOTE
-- Reviewed goals of therapy are to get the best control we can without hypoglycemia  -- Treat as type 1 diabetic.   A1c at goal   Dexcom reviewed : blood sugars are at goal, higher overnight the last couple of days  Continue  Metformin 1000 mg twice daily   Continue Mounjaro 5 mg weekly  Decrease Tresiba to 17 units daily   Continue  Novolog                Breakfast:          2-4 Units              Lunch:               2-5 Units              Dinner:              2-5 Units     -- Please try to give insulin at least 10 minutes before a meal.   -- Reviewed patient's current insulin regimen. Clarified proper insulin dose and timing in relation to meals, etc. Insulin injection sites and proper rotation instructed.    -- Advised frequent self blood glucose monitoring.  Patient encouraged to document glucose results and bring them to every clinic visit    -- Hypoglycemia precautions discussed. Instructed on precautions before driving.    -- Call for Bg repeatedly < 90 or > 180.   -- Close adherence to lifestyle changes recommended.   -- Periodic follow ups for eye evaluations, foot care and dental care suggested.  -- Given information on diabetic snacks and hypoglycemia previously  Cholesterol at goal without medications

## 2023-11-02 ENCOUNTER — OFFICE VISIT (OUTPATIENT)
Dept: PODIATRY | Facility: CLINIC | Age: 63
End: 2023-11-02
Payer: COMMERCIAL

## 2023-11-02 ENCOUNTER — OFFICE VISIT (OUTPATIENT)
Dept: ENDOCRINOLOGY | Facility: CLINIC | Age: 63
End: 2023-11-02
Payer: COMMERCIAL

## 2023-11-02 VITALS — DIASTOLIC BLOOD PRESSURE: 78 MMHG | OXYGEN SATURATION: 97 % | HEART RATE: 97 BPM | SYSTOLIC BLOOD PRESSURE: 120 MMHG

## 2023-11-02 VITALS — HEIGHT: 59 IN | BODY MASS INDEX: 31.11 KG/M2 | WEIGHT: 154.31 LBS

## 2023-11-02 DIAGNOSIS — E10.9 TYPE 1 DIABETES MELLITUS WITHOUT COMPLICATION: ICD-10-CM

## 2023-11-02 DIAGNOSIS — M65.9 SYNOVITIS OF LEFT ANKLE: ICD-10-CM

## 2023-11-02 DIAGNOSIS — E04.1 THYROID NODULE: ICD-10-CM

## 2023-11-02 DIAGNOSIS — E11.3393 TYPE 2 DIABETES MELLITUS WITH BOTH EYES AFFECTED BY MODERATE NONPROLIFERATIVE RETINOPATHY WITHOUT MACULAR EDEMA, WITH LONG-TERM CURRENT USE OF INSULIN: Primary | ICD-10-CM

## 2023-11-02 DIAGNOSIS — M76.72 PERONEAL TENDINITIS OF LEFT LOWER EXTREMITY: Primary | ICD-10-CM

## 2023-11-02 DIAGNOSIS — Z79.4 TYPE 2 DIABETES MELLITUS WITH BOTH EYES AFFECTED BY MODERATE NONPROLIFERATIVE RETINOPATHY WITHOUT MACULAR EDEMA, WITH LONG-TERM CURRENT USE OF INSULIN: Primary | ICD-10-CM

## 2023-11-02 DIAGNOSIS — E66.09 CLASS 1 OBESITY DUE TO EXCESS CALORIES WITH SERIOUS COMORBIDITY AND BODY MASS INDEX (BMI) OF 33.0 TO 33.9 IN ADULT: ICD-10-CM

## 2023-11-02 PROCEDURE — 99999 PR PBB SHADOW E&M-EST. PATIENT-LVL V: ICD-10-PCS | Mod: PBBFAC,,, | Performed by: NURSE PRACTITIONER

## 2023-11-02 PROCEDURE — 3008F BODY MASS INDEX DOCD: CPT | Mod: CPTII,S$GLB,, | Performed by: PODIATRIST

## 2023-11-02 PROCEDURE — 3074F SYST BP LT 130 MM HG: CPT | Mod: CPTII,S$GLB,, | Performed by: NURSE PRACTITIONER

## 2023-11-02 PROCEDURE — 1159F MED LIST DOCD IN RCRD: CPT | Mod: CPTII,S$GLB,, | Performed by: PODIATRIST

## 2023-11-02 PROCEDURE — 99999 PR PBB SHADOW E&M-EST. PATIENT-LVL V: CPT | Mod: PBBFAC,,, | Performed by: NURSE PRACTITIONER

## 2023-11-02 PROCEDURE — 3044F HG A1C LEVEL LT 7.0%: CPT | Mod: CPTII,S$GLB,, | Performed by: NURSE PRACTITIONER

## 2023-11-02 PROCEDURE — 3044F HG A1C LEVEL LT 7.0%: CPT | Mod: CPTII,S$GLB,, | Performed by: PODIATRIST

## 2023-11-02 PROCEDURE — 99214 PR OFFICE/OUTPT VISIT, EST, LEVL IV, 30-39 MIN: ICD-10-PCS | Mod: 25,S$GLB,, | Performed by: NURSE PRACTITIONER

## 2023-11-02 PROCEDURE — 3066F PR DOCUMENTATION OF TREATMENT FOR NEPHROPATHY: ICD-10-PCS | Mod: CPTII,S$GLB,, | Performed by: PODIATRIST

## 2023-11-02 PROCEDURE — 99999 PR PBB SHADOW E&M-EST. PATIENT-LVL IV: CPT | Mod: PBBFAC,,, | Performed by: PODIATRIST

## 2023-11-02 PROCEDURE — 3008F PR BODY MASS INDEX (BMI) DOCUMENTED: ICD-10-PCS | Mod: CPTII,S$GLB,, | Performed by: PODIATRIST

## 2023-11-02 PROCEDURE — 3061F PR NEG MICROALBUMINURIA RESULT DOCUMENTED/REVIEW: ICD-10-PCS | Mod: CPTII,S$GLB,, | Performed by: PODIATRIST

## 2023-11-02 PROCEDURE — 3061F NEG MICROALBUMINURIA REV: CPT | Mod: CPTII,S$GLB,, | Performed by: PODIATRIST

## 2023-11-02 PROCEDURE — 3044F PR MOST RECENT HEMOGLOBIN A1C LEVEL <7.0%: ICD-10-PCS | Mod: CPTII,S$GLB,, | Performed by: NURSE PRACTITIONER

## 2023-11-02 PROCEDURE — 1159F PR MEDICATION LIST DOCUMENTED IN MEDICAL RECORD: ICD-10-PCS | Mod: CPTII,S$GLB,, | Performed by: PODIATRIST

## 2023-11-02 PROCEDURE — 3066F NEPHROPATHY DOC TX: CPT | Mod: CPTII,S$GLB,, | Performed by: NURSE PRACTITIONER

## 2023-11-02 PROCEDURE — 3061F PR NEG MICROALBUMINURIA RESULT DOCUMENTED/REVIEW: ICD-10-PCS | Mod: CPTII,S$GLB,, | Performed by: NURSE PRACTITIONER

## 2023-11-02 PROCEDURE — 3074F PR MOST RECENT SYSTOLIC BLOOD PRESSURE < 130 MM HG: ICD-10-PCS | Mod: CPTII,S$GLB,, | Performed by: NURSE PRACTITIONER

## 2023-11-02 PROCEDURE — 1160F RVW MEDS BY RX/DR IN RCRD: CPT | Mod: CPTII,S$GLB,, | Performed by: PODIATRIST

## 2023-11-02 PROCEDURE — 3078F DIAST BP <80 MM HG: CPT | Mod: CPTII,S$GLB,, | Performed by: NURSE PRACTITIONER

## 2023-11-02 PROCEDURE — 1160F PR REVIEW ALL MEDS BY PRESCRIBER/CLIN PHARMACIST DOCUMENTED: ICD-10-PCS | Mod: CPTII,S$GLB,, | Performed by: PODIATRIST

## 2023-11-02 PROCEDURE — 95251 CONT GLUC MNTR ANALYSIS I&R: CPT | Mod: S$GLB,,, | Performed by: NURSE PRACTITIONER

## 2023-11-02 PROCEDURE — 3044F PR MOST RECENT HEMOGLOBIN A1C LEVEL <7.0%: ICD-10-PCS | Mod: CPTII,S$GLB,, | Performed by: PODIATRIST

## 2023-11-02 PROCEDURE — 95251 PR GLUCOSE MONITOR, 72 HOUR, PHYS INTERP: ICD-10-PCS | Mod: S$GLB,,, | Performed by: NURSE PRACTITIONER

## 2023-11-02 PROCEDURE — 99213 OFFICE O/P EST LOW 20 MIN: CPT | Mod: S$GLB,,, | Performed by: PODIATRIST

## 2023-11-02 PROCEDURE — 99213 PR OFFICE/OUTPT VISIT, EST, LEVL III, 20-29 MIN: ICD-10-PCS | Mod: S$GLB,,, | Performed by: PODIATRIST

## 2023-11-02 PROCEDURE — 1159F MED LIST DOCD IN RCRD: CPT | Mod: CPTII,S$GLB,, | Performed by: NURSE PRACTITIONER

## 2023-11-02 PROCEDURE — 3066F PR DOCUMENTATION OF TREATMENT FOR NEPHROPATHY: ICD-10-PCS | Mod: CPTII,S$GLB,, | Performed by: NURSE PRACTITIONER

## 2023-11-02 PROCEDURE — 3078F PR MOST RECENT DIASTOLIC BLOOD PRESSURE < 80 MM HG: ICD-10-PCS | Mod: CPTII,S$GLB,, | Performed by: NURSE PRACTITIONER

## 2023-11-02 PROCEDURE — 1160F PR REVIEW ALL MEDS BY PRESCRIBER/CLIN PHARMACIST DOCUMENTED: ICD-10-PCS | Mod: CPTII,S$GLB,, | Performed by: NURSE PRACTITIONER

## 2023-11-02 PROCEDURE — 1159F PR MEDICATION LIST DOCUMENTED IN MEDICAL RECORD: ICD-10-PCS | Mod: CPTII,S$GLB,, | Performed by: NURSE PRACTITIONER

## 2023-11-02 PROCEDURE — 3066F NEPHROPATHY DOC TX: CPT | Mod: CPTII,S$GLB,, | Performed by: PODIATRIST

## 2023-11-02 PROCEDURE — 99999 PR PBB SHADOW E&M-EST. PATIENT-LVL IV: ICD-10-PCS | Mod: PBBFAC,,, | Performed by: PODIATRIST

## 2023-11-02 PROCEDURE — 3061F NEG MICROALBUMINURIA REV: CPT | Mod: CPTII,S$GLB,, | Performed by: NURSE PRACTITIONER

## 2023-11-02 PROCEDURE — 99214 OFFICE O/P EST MOD 30 MIN: CPT | Mod: 25,S$GLB,, | Performed by: NURSE PRACTITIONER

## 2023-11-02 PROCEDURE — 1160F RVW MEDS BY RX/DR IN RCRD: CPT | Mod: CPTII,S$GLB,, | Performed by: NURSE PRACTITIONER

## 2023-11-02 RX ORDER — INSULIN DEGLUDEC 100 U/ML
17 INJECTION, SOLUTION SUBCUTANEOUS DAILY
Qty: 6 ML | Refills: 10 | Status: SHIPPED | OUTPATIENT
Start: 2023-11-02 | End: 2023-12-10 | Stop reason: SDUPTHER

## 2023-11-02 RX ORDER — DICLOFENAC SODIUM 10 MG/G
2 GEL TOPICAL 4 TIMES DAILY
Qty: 100 G | Refills: 2 | Status: SHIPPED | OUTPATIENT
Start: 2023-11-02 | End: 2024-01-04 | Stop reason: ALTCHOICE

## 2023-11-02 NOTE — PROGRESS NOTES
[facility] - [facility]  [address]  [address]  [place] [address]  Dept: [phone]  Dept Fax: [phone]    Rodney Orozco Jr., KATY     Assessment:   MDM    Coding  1. Peroneal tendinitis of left lower extremity  Ambulatory referral/consult to Physical/Occupational Therapy    diclofenac sodium (VOLTAREN) 1 % Gel      2. Synovitis of left ankle  Ambulatory referral/consult to Physical/Occupational Therapy    diclofenac sodium (VOLTAREN) 1 % Gel          Plan:     Procedures  1. Peroneal tendinitis of left lower extremity  -     Ambulatory referral/consult to Physical/Occupational Therapy; Future; Expected date: 11/09/2023  -     diclofenac sodium (VOLTAREN) 1 % Gel; Apply 2 g topically 4 (four) times daily. for 10 days  Dispense: 100 g; Refill: 2    2. Synovitis of left ankle  -     Ambulatory referral/consult to Physical/Occupational Therapy; Future; Expected date: 11/09/2023  -     diclofenac sodium (VOLTAREN) 1 % Gel; Apply 2 g topically 4 (four) times daily. for 10 days  Dispense: 100 g; Refill: 2      Kalpana was seen today for ankle injury.    Diagnoses and all orders for this visit:    Peroneal tendinitis of left lower extremity  -     Ambulatory referral/consult to Physical/Occupational Therapy; Future  -     diclofenac sodium (VOLTAREN) 1 % Gel; Apply 2 g topically 4 (four) times daily. for 10 days    Synovitis of left ankle  -     Ambulatory referral/consult to Physical/Occupational Therapy; Future  -     diclofenac sodium (VOLTAREN) 1 % Gel; Apply 2 g topically 4 (four) times daily. for 10 days        -pt seen, evaluated, and managed  -dx discussed in detail. All questions/concerns addressed  -all tx options discussed. All alternatives, risks, benefits of all txs discussed  -We discussed conservative care options possible including but not limited to shoe wear and/or padding, bracing/strapping, at home ROM, formal PT, medical therapy, injection therapy  - The utilization of  NSAIDs can be considered but their benefit has to be tempered against the risk of GI/ concerns  - A steroid injection can be undertaken.  We did discuss the potential mechanism of action of this shot.  Understanding that multiple injections at the same anatomic site do have deleterious effects on the soft tissue.  Generic risks include: steroid flare (advised to ice if necessary), skin hypo-pgimentation (which can be permanent and unsightly), elevation of blood sugar, subcutaneous atrophy (can be permanent) and infection.   -XR/imaging reviewed by me: agree with read  -labs reviewed by me: ok for vgel  -implemented icing/stretching regimen  - ASO  dispensed - use for 4 wks then wean from    -rxs dispensed:vgel  -referrals: PT  -WB: wbat       Follow up in about 7 weeks (around 2023).    Subjective:      Patient ID: Kalpana Rivera is a 62 y.o. female.    Chief Complaint:   Chief Complaint   Patient presents with    Ankle Injury     Left        CC - foot pain: patient presents to the podiatry clinic  with complaint of  left foot pain. Onset of the symptoms was several months ago. Precipitating event: unk. Current symptoms include: ability to bear weight, but with some pain, stiffness, swelling and worsening symptoms after a period of activity. Aggravating factors: walking and certain shoegear. Symptoms have gradually worsened. Patient has had no prior foot problems. Evaluation to date: none. Treatment to date: avoidance of offending activity. Patients rates pain 6/10 on pain scale.        23:  Hx as above. F/u for PL/PB tendonitis. Been in cAM boot. Pain improving.    Ankle Pain         Last Podiatry Enc: Visit date not found  Last Enc w/ Me: Visit date not found    Outside reports reviewed: historical medical records.  Family hx: as below  Past Medical History:   Diagnosis Date    Diabetes mellitus, type 2      Past Surgical History:   Procedure Laterality Date    CARPAL TUNNEL RELEASE        "SECTION      2    left thumb      right middle finger      right shoulder       Family History   Problem Relation Age of Onset    Diabetes Mother     Parkinsonism Mother     Alzheimer's disease Mother     Diabetes Father     Cancer Father      Current Outpatient Medications   Medication Sig Dispense Refill    blood-glucose sensor (DEXCOM G7 SENSOR) Brittany To change every 10 days 3 each 3    cetirizine (ZYRTEC) 10 MG tablet Take 10 mg by mouth once daily.      fluticasone propionate (FLONASE) 50 mcg/actuation nasal spray 1 spray by Each Nostril route once daily.      insulin aspart U-100 (NOVOLOG FLEXPEN U-100 INSULIN) 100 unit/mL (3 mL) InPn pen INJECT 3 UNITS WITH BREAKFAST, 5 UNITS WITH LUNCH, AND 5 UNITS WITH DINNER, INJECT 1-2 UNITS WITH SNACKS. 45 mL 1    Lactobacillus rhamnosus GG (CULTURELLE) 10 billion cell capsule Take 1 capsule by mouth once daily.      metFORMIN (GLUCOPHAGE-XR) 500 MG ER 24hr tablet Take 2 tablets (1,000 mg total) by mouth 2 (two) times daily with meals. 360 tablet 1    multivitamin with minerals tablet Take 1 tablet by mouth once daily.      pen needle, diabetic (BD ULTRA-FINE SHORT PEN NEEDLE) 31 gauge x 5/16" Ndle USE TO INJECT INSULIN FOUR TIMES A  each 1    pravastatin (PRAVACHOL) 10 MG tablet Take 10 mg by mouth every evening.      tirzepatide (MOUNJARO) 5 mg/0.5 mL PnIj INJECT 5 MG INTO THE SKIN EVERY 7 DAYS 4 pen 4    TRESIBA FLEXTOUCH U-200 200 unit/mL (3 mL) insulin pen       TURMERIC ORAL Take by mouth.      UNABLE TO FIND Cranberry & Buchu natural supplement      UNABLE TO FIND Nerve Eight natural supplement      UNABLE TO FIND [place] natural supplement      VITAMIN B COMPLEX ORAL Take 1 tablet by mouth.      blood-glucose transmitter (DEXCOM G6 TRANSMITTER) Brittany 2 Devices by Misc.(Non-Drug; Combo Route) route every 6 (six) months. 1 each 3    diclofenac sodium (VOLTAREN) 1 % Gel Apply 2 g topically 4 (four) times daily. for 10 days 100 g 2     No current " "facility-administered medications for this visit.     Review of patient's allergies indicates:   Allergen Reactions    Azithromycin Anaphylaxis     Social History     Socioeconomic History    Marital status:    Tobacco Use    Smoking status: Never    Smokeless tobacco: Never   Substance and Sexual Activity    Alcohol use: Yes    Drug use: Never       ROS    REVIEW OF SYSTEMS: Negative as documented below as well as positive findings in bold.       Constitutional  Respiratory  Gastrointestinal  Skin   - Fever - Cough - Heartburn - Rash   - Chills - Spit blood - Nausea - Itching   - Weight Loss - Shortness of breath - Vomiting - Nail pain   - Malaise/Fatigue - Wheezing - Abdominal Pain  Wound/Ulcer   - Weight Gain   - Blood in Stool  Poor wound healing       - Diarrhea          Cardiovascular  Genitourinary  Neurological  HEENT   - Chest Pain - Dysuria - Burning Sensation of feet - Headache   - Palpitations - Hematuria - Tingling / Paresthesia - Congestion   - Pain at night in legs - Flank Pain - Dizziness - Sore Throat   - Cramping   - Tremor - Blurred Vision   - Leg Swelling   - Sensory Change - Double Vision   - Dizzy when standing   - Speech Change - Eye Redness       - Focal Weakness - Dry Eyes       - Loss of Consciousness          Endocrine  Musculoskeletal  Psychiatric   - Cold intolerance - Muscle Pain - Depression   - Heat intolerance - Neck Pain - Insomnia   - Anemia - Joint Pain - Memory Loss   -  Easy bruising, bleeding - Heel pain - Anxiety      Toe Pain        Leg/Ankle/Foot Pain         Objective:     Ht 4' 11" (1.499 m)   Wt 70 kg (154 lb 5.2 oz)   BMI 31.17 kg/m²   Vitals:    11/02/23 0827   Weight: 70 kg (154 lb 5.2 oz)   Height: 4' 11" (1.499 m)         Physical Exam    General Appearance:   Patient appears well developed, well nourished  Patient appears stated age    Psychiatric:   Patient is oriented to time, place, and person.  Patient has appropriate mood and affect    Neck:  Trachea " Midline  No visible masses    Respiratory/Ears:  No distress or labored breathing.  Able to differentiate between normal talking voice and whisper.  Able to follow commands    Eyes:  Visual Acuity intact  Lids and conjunctivae normal. No discoloration noted.    Foot Exam  Physical Exam  Ortho Exam  Ortho/SPM Exam  Physical Exam  Foot/Ankle Musculoskeletal Exam    L LE exam con't:  V:  DP 2/4, PT 2/4   CRT< 3s to all digits tested   Tibial and popliteal lymph nodes are w/o abnormality   Edema: absent, varicosities: present    N:  Patient displays normal ankle reflexes   SILT in SP/DP/T/Cody/Saph distributions    Ortho: +Motor EHL/FHL/TA/GA   No major/gross deformity present   There is no decreased ROM at the ankle or ST joints: pain is not present, crepitus is not present  There is moderate pain with palpation of PL/PB in retromal area  +TTP CFL - no instability present  Compartments soft/compressible. No pain on passive stretch of big toe. No calf  Pain.    Derm:  skin intact, skin warm and dry, skin without ulcers or lesions, skin without induration, nails normal, no erythema and no ecchymosis    Imaging / Labs:      X-Ray Ankle Complete Left    Result Date: 10/5/2023  EXAM: XR ANKLE COMPLETE 3 VIEW LEFT CLINICAL HISTORY: [M79.672]-Pain in left foot. FINDINGS: 3 views of the left ankle.  No acute fracture or dislocation.  Ankle mortise is maintained.  No significant degenerative changes. IMPRESSION: No acute finding. Finalized on: 10/5/2023 9:35 AM By:  Reynaldo Marmolejo MD [facility]# [id number]      2023-10-05 09:37:[id number]    [facility]        Note: This was dictated using a computer transcription program. Although proofread, it may contain computer transcription errors and phonetic errors. Other human proofreading errors may also exist. Corrections may be performed at a later time. Please contact us for any clarification if needed.    Rodney Orozco DPM  [facility]

## 2023-11-02 NOTE — PATIENT INSTRUCTIONS
Dr. Kianna Taylor is an ENT-otolaryngologist in [place]    Thyroid Nodule               Check thyroid US in 4/2024     Cholesterol              Continue pravastatin 10 mg a couple of days a week   LDL at goal     Diabetes  A1c at goal   Dexcom reviewed : blood sugars are at goal, higher overnight the last couple of days  Continue  Metformin 1000 mg twice daily   Continue Mounjaro 5 mg weekly  Decrease Tresiba to 17 units daily   Continue  Novolog                Breakfast:          2-4 Units              Lunch:               2-5 Units              Dinner:              2-5 Units       Coronary Artery Calcium Score test to predict risk of heart attack in the next ten years

## 2023-11-02 NOTE — PATIENT INSTRUCTIONS
Joint Pain in the Foot  The foot contains 26 bones and more than 30 joints. Many people experience pain involving one or more of these joints. The pain may be accompanied by swelling, tenderness, stiffness, redness, bruising and/or increased warmth over the affected joints.    Joint pain may be caused by trauma, infection, inflammation, arthritis, bursitis, gout or structural foot problems. It is initially treated with rest, elevation and limitation of walking/weightbearing on the painful foot. Use of nonsteroidal anti-inflammatory drugs (NSAIDs), such as ibuprofen, and ice can help reduce local inflammation and pain. Custom orothotic devices may also be prescribed to support the foot and reduce pain. A foot and ankle surgeon can best determine the cause of joint pain and recommend the appropriate treatment.      Joint Swelling in the Foot  The foot contains 26 bones and more than 30 joints. The body's natural response to any type of joint injury is to increase blood flow to the affected area. This results in an accumulation of fluid in the tissues in and around the joint, resulting in swelling. Depending on the cause of the injury, joint swelling may be accompanied by stiffness, redness, warmth and pain.    Joint swelling and pain can also be referred to as capsulitis, bursitis or synovitis.    Joint swelling may also result from inflammatory, degenerative, traumatic, infective or crystal-forming joint diseases, such as gout. If joint swelling persists, a foot and ankle surgeon can best determine the cause and recommend the appropriate treatment.    Synovitis  Synovitis is an inflammation of the tissues that line a joint. It is commonly associated with specific diseases, such as arthritis or gout, but it may also be the result of overuse or trauma. Symptoms of synovitis may include redness, swelling, warmth and pain with joint motion.    Evaluation by a foot and ankle surgeon will help confirm the diagnosis and  will help rule out other possible concerns, such as fractures or infections. The surgeon may sample fluid from the joint to analyze for inflammatory cells or may order x-rays or other advanced imaging tests to better evaluate the affected joint.    Treatment for synovitis includes rest, ice, immobilization and oral nonsteroidal anti-inflammatory drugs (NSAIDs), such as ibuprofen, and may include steroid injections into the joint. Surgery may be indicated in longstanding cases.      What Is Capsulitis    Ligaments surrounding the joint form a capsule, which helps the joint to function properly. Capsulitis is a condition in which these ligaments have become inflamed. Capsulitis can also occur in the most any joint in the foot and ankle. This inflammation causes considerable discomfort and, if left untreated, can eventually lead to a weakening of surrounding ligaments that can cause dislocation.     Symptoms  Because capsulitis can be a progressive disorder and usually worsens if left untreated, early recognition and treatment are important. In the earlier stages--the best time to seek treatment--the symptoms may include:    Pain  Swelling in the area of pain  Difficulty wearing shoes  Pain when walking barefoot     In more advanced stages, the supportive ligaments weaken, leading to failure of the joint to stabilize. The unstable joint can lead to other problems, such as repetitive injury to the cartilage of a joint.    Diagnosis  In arriving at a diagnosis, the foot and ankle surgeon will examine the foot, press on it and maneuver it to reproduce the symptoms. The surgeon will also look for potential causes and test the stability of the joint. X-rays are usually ordered, and other imaging studies are sometimes needed.    Nonsurgical Treatment  The best time to treat capsulitis is during the early stages. At that time, nonsurgical approaches can be used to stabilize the joint, reduce the symptoms and address the  underlying cause of the condition.    The foot and ankle surgeon may select one or more of the following options for early treatment of capsulitis:    Rest and ice. Staying off the foot and applying ice packs help reduce the swelling and pain. Apply an ice pack, placing a thin towel between the ice and the skin. Use ice for 20 minutes and then wait at least 40 minutes before icing again.  Oral medications. Nonsteroidal anti-inflammatory drugs (NSAIDs), such as ibuprofen, may help relieve the pain and inflammation.  Taping/splinting. It may be necessary to tape the toe so that it will stay in the correct position. This helps relieve the pain and prevent further drifting of the toe.  Stretching. Stretching exercises may be prescribed for patients who have tight calf muscles.  Shoe modifications. Supportive shoes with stiff soles are recommended because they control the motion and lessen the amount of pressure on the ball of the foot.  Orthotic devices. Custom shoe inserts are often very beneficial. These include arch supports or a metatarsal pad that distributes the weight away from the joint.     When Is Surgery Needed?  Once the second toe starts moving toward the big toe, it will never go back to its normal position unless surgery is performed. The foot and ankle surgeon will select the procedure or combination of procedures best suited to the individual patient.    Nonsurgical Treatment  When you have joint inflammation or swelling, rehabilitation is crucial--and it starts the moment your treatment begins. Your foot and ankle surgeon may recommend one or more of the following treatment options:      -Rest. Stay off the injured ankle. Walking may cause further injury.  -Ice. Apply an ice pack to the injured area, placing a thin towel between the ice and the skin. Use ice for 20 minutes and then wait at least 40 minutes before icing again.  -Compression. An elastic wrap may be recommended to control  swelling.  -Elevation. The ankle should be raised slightly above the level of your heart to reduce swelling.  -Early physical therapy. Your doctor will start you on a rehabilitation program as soon as possible to promote healing and increase your range of motion. This includes doing prescribed exercises.  -Medications. Nonsteroidal anti-inflammatory drugs (NSAIDs), such as ibuprofen, may be recommended to reduce pain and inflammation. In some cases, prescription pain medications are needed to provide adequate relief.  -Immobilization. A period of limited or no weight bearing on the injured extremity.     When Is Surgery Needed?  In more severe cases, surgery may be required to adequately treat an injury to the foot or ankle. Surgery often involves repairing the damaged structures, ligament or ligaments. The foot and ankle surgeon will select the surgical procedure best suited for your case based on the type and severity of your injury as well as your activity level.    After surgery, rehabilitation is extremely important. Completing your rehabilitation program is crucial to a successful outcome. Be sure to continue to see your foot and ankle surgeon during this period to ensure that your ankle heals properly and function is restored.      R.I.C.E. Protocol      R.I.C.E. stands for Rest, Ice, Compression, and Elevation. Doing these things helps limit pain and swelling after an injury. R.I.C.E. also helps injuries heal faster. Use R.I.C.E. for sprains, strains, and severe bruises, joint pain/swelling, or foot/ankle inflammation. Follow the tips on this handout and begin R.I.C.E. as soon as possible after an injury.  []   Rest  Pain is your body's way of telling you to rest an injured area. Whether you have hurt an elbow, hand, foot, or knee, limiting its use will prevent further injury and help you heal.  []   Ice  Applying ice right after an injury helps prevent swelling and reduce pain. Don't place ice directly on  your skin.  Wrap a cold pack or bag of ice in a thin cloth. Place it over the injured area.  Ice for 10 minutes every 3 hours. Don't ice for more than 20 minutes at a time.  []   Compression  Putting pressure (compression) on an injury helps prevent swelling and provides support.  Wrap the injured area firmly with an elastic bandage. If your hand or foot tingles, becomes discolored, or feels cold to the touch, the bandage may be too tight. Rewrap it more loosely.  If your bandage becomes too loose, rewrap it.  Do not wear an elastic bandage overnight.  []   Elevation  Keeping an injury elevated helps reduce swelling, pain, and throbbing. Elevation is most effective when the injury is kept elevated higher than the heart.     Call your healthcare provider if you notice any of the following:  Fingers or toes feel numb, are cold to the touch, or change color  Skin looks shiny or tight  Pain, swelling, or bruising worsens and is not improved with elevation     Peroneal Tendon Injuries  What Are the Peroneal Tendons?         A tendon is a band of tissue that connects a muscle to a bone. The two peroneal tendons in the foot run side by side behind the outer ankle bone. One peroneal tendon attaches to the outer part of the midfoot, while the other tendon runs under the foot and attaches near the inside of the arch. The main function of the peroneal tendons is to stabilize the foot and ankle and protect them from sprains.    Causes & Symptoms of Peroneal Tendon Injuries  Peroneal tendon injuries may be acute (occurring suddenly) or chronic (developing over a period of time). They most commonly occur in individuals who participate in sports that involve repetitive ankle motion. In addition, people with higher arches are at risk for developing peroneal tendon injuries. Basic types of peroneal tendon injuries are tendonitis, tears and subluxation.    Tendonitis is an inflammation of one or both tendons. The inflammation is  caused by activities involving repetitive use of the tendon, overuse of the tendon or trauma (such as an ankle sprain). Symptoms of tendonitis include:    Pain  Swelling  Warm to the touch     Acute tears are caused by repetitive activity or trauma. Immediate symptoms of acute tears include:    Pain  Swelling  Weakness or instability of the foot and ankle     As time goes on, these tears may lead to a change in the shape of the foot in which the arch may become higher.    Degenerative tears (tendonosis) are usually due to overuse and occur over long periods of time, often years. In degenerative tears, the tendon is like taffy that has been overstretched until it becomes thin and eventually frays. Having high arches also puts you at risk for developing a degenerative tear. The symptoms of degenerative tears may include:    Sporadic pain (occurring from time to time) on the outside of the ankle  Weakness or instability in the ankle  An increase in the height of the arch     Subluxation means one or both tendons have slipped out of their normal position. In some cases, subluxation is due to a condition in which a person is born with a variation in the shape of the bone or muscle. In other cases, subluxation occurs following trauma, such as an ankle sprain. Damage or injury to the tissues that stabilize the tendons (retinaculum) can lead to chronic tendon subluxation. The symptoms of subluxation may include:    A snapping feeling of the tendon around the ankle bone  Sporadic pain behind the outside ankle bone  Ankle instability or weakness     Early treatment of a subluxation is critical since a tendon that continues to sublux (move out of position) is more likely to tear or rupture. Therefore, if you feel the characteristic snapping, see a foot and ankle surgeon immediately.    Diagnosis  Because peroneal tendon injuries are sometimes misdiagnosed and may worsen without proper treatment, prompt evaluation by a foot and  ankle surgeon is advised. To diagnose a peroneal tendon injury, the surgeon will examine the foot and look for pain, instability, swelling, warmth and weakness on the outer side of the ankle. In addition, an x-ray or other advanced imaging studies may be needed to fully evaluate the injury. The foot and ankle surgeon will also look for signs of an ankle sprain and other related injuries that sometimes accompany a peroneal tendon injury. Proper diagnosis is important because prolonged discomfort after a simple sprain may be a sign of additional problems.    Nonsurgical Treatment  Treatment depends on the type of peroneal tendon injury. Options include:    Immobilization. A cast or splint may be used to keep the foot and ankle from moving and allow the injury to heal.  Medications. Oral or injected anti-inflammatory drugs may help relieve pain and inflammation.  Physical therapy. Ice, heat or ultrasound therapy may be used to reduce swelling and pain. As symptoms improve, exercises can be added to strengthen the muscles and improve range of motion and balance.  Bracing. The surgeon may provide a brace to use for a short while or during activities requiring repetitive ankle motion. Bracing may also be an option when a patient is not a candidate for surgery.     When Is Surgery Needed?  In some cases, surgery may be needed to repair the tendon or tendons and perhaps the supporting structures of the foot. The foot and ankle surgeon will determine the most appropriate procedure for the patients condition and lifestyle. After surgery, physical therapy is an important part of rehabilitation.      What is Tendonitis of the Foot?  When you use a set of muscles too much, youre likely to strain the tendons (soft tissues) that connect those muscles to your bones. At first, pain or swelling may come and go quickly. But if you do too much too soon, your muscles may overtire again. The strain may cause a tendons outer covering  to swell or small fibers in a tendon to pull apart. If you keep pushing your muscles, damage to the tendons adds up, and tendonitis develops. Over time, pain and swelling may limit your activities. But with your doctors help, tendonitis can be controlled. Both your symptoms and your risk of future problems including tendon rupture can be reduced.       The back of your foot  The Achilles tendon connects the calf muscle to the heel bone. If tendonitis occurs here, you may feel pain when your foot touches down or when your heel lifts off the ground.   The front of your foot  The anterior tibial tendon helps control the front of your foot when it meets the ground. If this tendon is strained, you may feel pain when you go down stairs or walk or run on hills.     The inside of your foot  The posterior tibial tendon runs along the inside of the ankle and foot. If this tendon is strained, your foot may hurt when it moves forward to push off the ground. Or you may feel pain when your heel shifts from side to side.   The outside of your foot  The peroneal tendon wraps across the bottom of your foot, from the outside to the inside. Tendonitis here may cause pain when you stand or push off the ground and when walking on uneven surfaces.   Date Last Reviewed: 9/21/2015  © 2546-7812 [employer]. [address] [place] [address], [place] [address]. All rights reserved. This information is not intended as a substitute for professional medical care. Always follow your healthcare professional's instructions.        Treating Tendonitis of the Foot  Your healthcare provider's first concern is to reduce your symptoms. Using ice and heat, taking medicines, and limiting activity help control pain and swelling. Follow all of your healthcare provider's instructions. Returning to activity too soon may cause your symptoms to come back.    Ice and heat  Ice helps prevent swelling and reduce pain. Place ice on the painful area for 10 to  15 minutes. Repeat the icing several times a day. If ?you have had the problem for a while, using heat may help. Apply a heating pad or hot towels to the tendon for 20 to 30 minutes 2 or 3 times a day.  Medicines  Your healthcare provider may tell you to take ibuprofen or other anti-inflammatory medicines. These reduce pain and swelling. Take them as directed. Dont wait until you feel pain. In more severe cases, cortisone may be injected to relieve pain.  Limiting activities  Rest allows the tissues in your foot to heal. Stay off your feet for a few days, then slowly work back into activity. If you do high-impact activities, such as running or aerobics, try other activities that place less strain on your foot. Cycling and swimming are good choices.  Date Last Reviewed: 9/21/2015  © 2610-7988 [employer]. [address] [place] [address], [place] [address]. All rights reserved. This information is not intended as a substitute for professional medical care. Always follow your healthcare professional's instructions.

## 2023-11-03 ENCOUNTER — PATIENT MESSAGE (OUTPATIENT)
Dept: PODIATRY | Facility: CLINIC | Age: 63
End: 2023-11-03
Payer: COMMERCIAL

## 2023-11-06 DIAGNOSIS — E10.9 TYPE 1 DIABETES MELLITUS WITHOUT COMPLICATION: ICD-10-CM

## 2023-11-06 RX ORDER — TIRZEPATIDE 5 MG/.5ML
5 INJECTION, SOLUTION SUBCUTANEOUS
Qty: 4 PEN | Refills: 3 | Status: SHIPPED | OUTPATIENT
Start: 2023-11-06 | End: 2023-12-10 | Stop reason: SDUPTHER

## 2023-11-07 ENCOUNTER — OFFICE VISIT (OUTPATIENT)
Dept: OTOLARYNGOLOGY | Facility: CLINIC | Age: 63
End: 2023-11-07
Payer: COMMERCIAL

## 2023-11-07 ENCOUNTER — CLINICAL SUPPORT (OUTPATIENT)
Dept: OTOLARYNGOLOGY | Facility: CLINIC | Age: 63
End: 2023-11-07
Payer: COMMERCIAL

## 2023-11-07 VITALS
BODY MASS INDEX: 31.08 KG/M2 | WEIGHT: 153.88 LBS | HEART RATE: 103 BPM | DIASTOLIC BLOOD PRESSURE: 94 MMHG | SYSTOLIC BLOOD PRESSURE: 136 MMHG

## 2023-11-07 DIAGNOSIS — Z79.4 TYPE 2 DIABETES MELLITUS WITH BOTH EYES AFFECTED BY MODERATE NONPROLIFERATIVE RETINOPATHY WITHOUT MACULAR EDEMA, WITH LONG-TERM CURRENT USE OF INSULIN: ICD-10-CM

## 2023-11-07 DIAGNOSIS — H93.293 ABNORMAL AUDITORY PERCEPTION, BILATERAL: Primary | ICD-10-CM

## 2023-11-07 DIAGNOSIS — H91.90 HEARING LOSS, UNSPECIFIED HEARING LOSS TYPE, UNSPECIFIED LATERALITY: ICD-10-CM

## 2023-11-07 DIAGNOSIS — J31.0 CHRONIC RHINITIS: Chronic | ICD-10-CM

## 2023-11-07 DIAGNOSIS — E11.3393 TYPE 2 DIABETES MELLITUS WITH BOTH EYES AFFECTED BY MODERATE NONPROLIFERATIVE RETINOPATHY WITHOUT MACULAR EDEMA, WITH LONG-TERM CURRENT USE OF INSULIN: ICD-10-CM

## 2023-11-07 DIAGNOSIS — H69.90 DYSFUNCTION OF EUSTACHIAN TUBE, UNSPECIFIED LATERALITY: Primary | Chronic | ICD-10-CM

## 2023-11-07 PROCEDURE — 92567 TYMPANOMETRY: CPT | Mod: S$GLB,,, | Performed by: AUDIOLOGIST

## 2023-11-07 PROCEDURE — 3066F PR DOCUMENTATION OF TREATMENT FOR NEPHROPATHY: ICD-10-PCS | Mod: CPTII,S$GLB,, | Performed by: OTOLARYNGOLOGY

## 2023-11-07 PROCEDURE — 3061F PR NEG MICROALBUMINURIA RESULT DOCUMENTED/REVIEW: ICD-10-PCS | Mod: CPTII,S$GLB,, | Performed by: OTOLARYNGOLOGY

## 2023-11-07 PROCEDURE — 3080F PR MOST RECENT DIASTOLIC BLOOD PRESSURE >= 90 MM HG: ICD-10-PCS | Mod: CPTII,S$GLB,, | Performed by: OTOLARYNGOLOGY

## 2023-11-07 PROCEDURE — 3044F PR MOST RECENT HEMOGLOBIN A1C LEVEL <7.0%: ICD-10-PCS | Mod: CPTII,S$GLB,, | Performed by: OTOLARYNGOLOGY

## 2023-11-07 PROCEDURE — 99999 PR PBB SHADOW E&M-EST. PATIENT-LVL I: ICD-10-PCS | Mod: PBBFAC,,, | Performed by: AUDIOLOGIST

## 2023-11-07 PROCEDURE — 99203 PR OFFICE/OUTPT VISIT, NEW, LEVL III, 30-44 MIN: ICD-10-PCS | Mod: S$GLB,,, | Performed by: OTOLARYNGOLOGY

## 2023-11-07 PROCEDURE — 99999 PR PBB SHADOW E&M-EST. PATIENT-LVL I: CPT | Mod: PBBFAC,,, | Performed by: AUDIOLOGIST

## 2023-11-07 PROCEDURE — 3061F NEG MICROALBUMINURIA REV: CPT | Mod: CPTII,S$GLB,, | Performed by: OTOLARYNGOLOGY

## 2023-11-07 PROCEDURE — 3008F BODY MASS INDEX DOCD: CPT | Mod: CPTII,S$GLB,, | Performed by: OTOLARYNGOLOGY

## 2023-11-07 PROCEDURE — 92567 PR TYMPA2METRY: ICD-10-PCS | Mod: S$GLB,,, | Performed by: AUDIOLOGIST

## 2023-11-07 PROCEDURE — 1160F PR REVIEW ALL MEDS BY PRESCRIBER/CLIN PHARMACIST DOCUMENTED: ICD-10-PCS | Mod: CPTII,S$GLB,, | Performed by: OTOLARYNGOLOGY

## 2023-11-07 PROCEDURE — 1159F PR MEDICATION LIST DOCUMENTED IN MEDICAL RECORD: ICD-10-PCS | Mod: CPTII,S$GLB,, | Performed by: OTOLARYNGOLOGY

## 2023-11-07 PROCEDURE — 1160F RVW MEDS BY RX/DR IN RCRD: CPT | Mod: CPTII,S$GLB,, | Performed by: OTOLARYNGOLOGY

## 2023-11-07 PROCEDURE — 99999 PR PBB SHADOW E&M-EST. PATIENT-LVL IV: ICD-10-PCS | Mod: PBBFAC,,, | Performed by: OTOLARYNGOLOGY

## 2023-11-07 PROCEDURE — 99203 OFFICE O/P NEW LOW 30 MIN: CPT | Mod: S$GLB,,, | Performed by: OTOLARYNGOLOGY

## 2023-11-07 PROCEDURE — 99999 PR PBB SHADOW E&M-EST. PATIENT-LVL IV: CPT | Mod: PBBFAC,,, | Performed by: OTOLARYNGOLOGY

## 2023-11-07 PROCEDURE — 3075F PR MOST RECENT SYSTOLIC BLOOD PRESS GE 130-139MM HG: ICD-10-PCS | Mod: CPTII,S$GLB,, | Performed by: OTOLARYNGOLOGY

## 2023-11-07 PROCEDURE — 3075F SYST BP GE 130 - 139MM HG: CPT | Mod: CPTII,S$GLB,, | Performed by: OTOLARYNGOLOGY

## 2023-11-07 PROCEDURE — 3066F NEPHROPATHY DOC TX: CPT | Mod: CPTII,S$GLB,, | Performed by: OTOLARYNGOLOGY

## 2023-11-07 PROCEDURE — 3080F DIAST BP >= 90 MM HG: CPT | Mod: CPTII,S$GLB,, | Performed by: OTOLARYNGOLOGY

## 2023-11-07 PROCEDURE — 3044F HG A1C LEVEL LT 7.0%: CPT | Mod: CPTII,S$GLB,, | Performed by: OTOLARYNGOLOGY

## 2023-11-07 PROCEDURE — 3008F PR BODY MASS INDEX (BMI) DOCUMENTED: ICD-10-PCS | Mod: CPTII,S$GLB,, | Performed by: OTOLARYNGOLOGY

## 2023-11-07 PROCEDURE — 1159F MED LIST DOCD IN RCRD: CPT | Mod: CPTII,S$GLB,, | Performed by: OTOLARYNGOLOGY

## 2023-11-07 NOTE — PATIENT INSTRUCTIONS
Patient will check with insurance about repeat hearing test.      Continue nasal sprays and allergy medications daily.

## 2023-11-07 NOTE — PROGRESS NOTES
Kalpana Rivera was seen today in the clinic for Impedance testing.  She reported having bilateral PE tubes but feels that her left ear is clogged.    Tympanometry revealed a Type B tympanogram with a 6.90 ear canal volume for the left ear.  A seal could not be obtained in the right ear.  She will follow up with ENT.

## 2023-11-07 NOTE — PROGRESS NOTES
"  Chief Complaint   Patient presents with    clogged ears     Left ear already had tubes     Ear Fullness        HPI: Kalpana Rivera is a 62 y.o. female who is self-referred for bilateral hearing loss, ear stuffiness",ear fullness, and ear popping which has been present for many years.  She had PE tubes placed by  in 2022 which relieved symptoms on the right but did not improve left-sided symptoms.  She reports the symptoms are still present.  She has been experiencing nasal congestion, post nasal drip, and rhinorrhea, and she uses daily nasal sprays and Zyrtec for control of the symptoms.    She has not had previous otologic surgeries other than PE tube placement.                Past Medical History:   Diagnosis Date    Diabetes mellitus, type 2      Social History     Socioeconomic History    Marital status:    Tobacco Use    Smoking status: Never    Smokeless tobacco: Never   Substance and Sexual Activity    Alcohol use: Yes    Drug use: Never     Past Surgical History:   Procedure Laterality Date    CARPAL TUNNEL RELEASE       SECTION      2    left thumb      right middle finger      right shoulder       Family History   Problem Relation Age of Onset    Diabetes Mother     Parkinsonism Mother     Alzheimer's disease Mother     Diabetes Father     Cancer Father            Review of Systems  General: negative for chills, fever or weight loss  Psychological: negative for mood changes or depression  Ophthalmic: negative for blurry vision, photophobia or eye pain  ENT: see HPI  Respiratory: no cough, shortness of breath, or wheezing  Cardiovascular: no chest pain or dyspnea on exertion  Gastrointestinal: no abdominal pain, change in bowel habits, or black/ bloody stools  Musculoskeletal: negative for gait disturbance or muscular weakness  Neurological: no syncope or seizures; no ataxia  Dermatological: negative for pruritis,  rash and jaundice  Hematologic/lymphatic: no easy " bruising, no new adenopathy      Physical Exam:    Vitals:    11/07/23 0909   BP: (!) 136/94   Pulse: 103         Constitutional:   She is oriented to person, place, and time. Vital signs are normal. She appears well-developed and well-nourished. She appears alert. She is cooperative. Normal speech.      Head:  Normocephalic and atraumatic. Salivary glands normal.  Facial strength is normal.      Ears:  Hearing normal to normal and whispered voice; external ear normal without scars, lesions, or masses; ear canal, tympanic membrane, and middle ear normal., right ear hearing normal to normal and whispered voice; external ear normal without scars, lesions, or masses; ear canal, tympanic membrane, and middle ear normal. and left ear hearing normal to normal and whispered voice; external ear normal without scars, lesions, or masses; ear canal, tympanic membrane, and middle ear normal..   Right Ear: Tympanic membrane is not erythematous, not retracted and not bulging. No middle ear effusion. A PE tube (PE tube visible and patent) is seen.   Left Ear: Tympanic membrane is not erythematous, not retracted and not bulging.  No middle ear effusion. A PE tube (PE tube visible, can not see entire shaft PE tube lumen) is seen.     Nose:  Mucosal edema present. No rhinorrhea, septal deviation or polyps. Turbinates abnormal and turbinate hypertrophy (3+, boggy, congested mucosa).  Right sinus exhibits no maxillary sinus tenderness and no frontal sinus tenderness. Left sinus exhibits no maxillary sinus tenderness and no frontal sinus tenderness.     Mouth/Throat  Oropharynx clear and moist without lesions or asymmetry, lips, teeth, and gums normal and oropharynx normal. No mucous membrane lesions. No oropharyngeal exudate, posterior oropharyngeal edema or posterior oropharyngeal erythema. Mirror exam not performed due to patient tolerance.  Mirror exam not performed due to patient tolerance.      Neck:  Neck normal without  thyromegaly masses, asymmetry, normal tracheal structure, crepitus, and tenderness, thyroid normal, trachea normal, phonation normal, full range of motion with neck supple and no adenopathy. No JVD present. Carotid bruit is not present. No thyroid mass and no thyromegaly present.     She has no cervical adenopathy.     Cardiovascular:    Normal rate, regular rhythm and rate and rhythm, heart sounds, and pulses normal.              Pulmonary/Chest:   Effort and breath sounds normal.     Psychiatric:   She has a normal mood and affect. Her speech is normal and behavior is normal.     Neurological:   She is alert and oriented to person, place, and time. She has neurological normal, alert and oriented. No cranial nerve deficit.     Skin:   No abrasions, lacerations, lesions, or rashes.             Assessment:    ICD-10-CM ICD-9-CM    1. Dysfunction of Eustachian tube, unspecified laterality  H69.90 381.81       2. Hearing loss, unspecified hearing loss type, unspecified laterality  H91.90 389.9       3. Chronic rhinitis  J31.0 472.0         The primary encounter diagnosis was Dysfunction of Eustachian tube, unspecified laterality. Diagnoses of Hearing loss, unspecified hearing loss type, unspecified laterality and Chronic rhinitis were also pertinent to this visit.      Plan:    Will obtain old audiograms from previous ENT.    I discussed with patient that new audiogram is needed for best assessment.  She wants to check with her insurance company as to whether they will approve payment for this procedure prior to having the audiogram.  She will let us know after she discusses with them and we will schedule audiogram at that time.  Continue nasal spray and allergy medication.      Sarah Retana MD

## 2023-11-13 ENCOUNTER — PATIENT MESSAGE (OUTPATIENT)
Dept: OTOLARYNGOLOGY | Facility: CLINIC | Age: 63
End: 2023-11-13
Payer: COMMERCIAL

## 2023-11-14 ENCOUNTER — PATIENT MESSAGE (OUTPATIENT)
Dept: ADMINISTRATIVE | Facility: HOSPITAL | Age: 63
End: 2023-11-14
Payer: COMMERCIAL

## 2023-11-16 NOTE — TELEPHONE ENCOUNTER
Thank you for sending those results.    My staff can assist with getting you scheduled for repeat hearing test and we will assess the comparison to decide on further recommendations.    Sarah Retana MD  [facility]    This note was created by combination of typed  and MModal dictation.  Transcription errors may be present.  If there are any questions, please contact me.

## 2023-11-20 NOTE — TELEPHONE ENCOUNTER
Spoke with patient and was able to get her scheduled for December 5,2023 at 8:00 am following  at 8:20 am.

## 2023-12-05 ENCOUNTER — OFFICE VISIT (OUTPATIENT)
Dept: OTOLARYNGOLOGY | Facility: CLINIC | Age: 63
End: 2023-12-05
Payer: COMMERCIAL

## 2023-12-05 ENCOUNTER — CLINICAL SUPPORT (OUTPATIENT)
Dept: OTOLARYNGOLOGY | Facility: CLINIC | Age: 63
End: 2023-12-05
Payer: COMMERCIAL

## 2023-12-05 VITALS
HEART RATE: 89 BPM | SYSTOLIC BLOOD PRESSURE: 139 MMHG | BODY MASS INDEX: 30.35 KG/M2 | DIASTOLIC BLOOD PRESSURE: 87 MMHG | WEIGHT: 150.25 LBS

## 2023-12-05 DIAGNOSIS — H90.3 SENSORINEURAL HEARING LOSS, BILATERAL: Primary | ICD-10-CM

## 2023-12-05 DIAGNOSIS — H90.3 SENSORINEURAL HEARING LOSS (SNHL) OF BOTH EARS: Chronic | ICD-10-CM

## 2023-12-05 DIAGNOSIS — E04.1 THYROID NODULE: Primary | ICD-10-CM

## 2023-12-05 DIAGNOSIS — H93.8X3 EAR FULLNESS, BILATERAL: ICD-10-CM

## 2023-12-05 DIAGNOSIS — H69.90 DYSFUNCTION OF EUSTACHIAN TUBE, UNSPECIFIED LATERALITY: Primary | Chronic | ICD-10-CM

## 2023-12-05 DIAGNOSIS — H92.03 OTALGIA, BILATERAL: ICD-10-CM

## 2023-12-05 PROCEDURE — 3044F PR MOST RECENT HEMOGLOBIN A1C LEVEL <7.0%: ICD-10-PCS | Mod: CPTII,S$GLB,, | Performed by: OTOLARYNGOLOGY

## 2023-12-05 PROCEDURE — 3008F BODY MASS INDEX DOCD: CPT | Mod: CPTII,S$GLB,, | Performed by: OTOLARYNGOLOGY

## 2023-12-05 PROCEDURE — 92557 COMPREHENSIVE HEARING TEST: CPT | Mod: S$GLB,,, | Performed by: AUDIOLOGIST

## 2023-12-05 PROCEDURE — 3061F PR NEG MICROALBUMINURIA RESULT DOCUMENTED/REVIEW: ICD-10-PCS | Mod: CPTII,S$GLB,, | Performed by: OTOLARYNGOLOGY

## 2023-12-05 PROCEDURE — 92567 PR TYMPA2METRY: ICD-10-PCS | Mod: S$GLB,,, | Performed by: AUDIOLOGIST

## 2023-12-05 PROCEDURE — 3066F NEPHROPATHY DOC TX: CPT | Mod: CPTII,S$GLB,, | Performed by: OTOLARYNGOLOGY

## 2023-12-05 PROCEDURE — 1159F PR MEDICATION LIST DOCUMENTED IN MEDICAL RECORD: ICD-10-PCS | Mod: CPTII,S$GLB,, | Performed by: OTOLARYNGOLOGY

## 2023-12-05 PROCEDURE — 3075F SYST BP GE 130 - 139MM HG: CPT | Mod: CPTII,S$GLB,, | Performed by: OTOLARYNGOLOGY

## 2023-12-05 PROCEDURE — 1159F MED LIST DOCD IN RCRD: CPT | Mod: CPTII,S$GLB,, | Performed by: OTOLARYNGOLOGY

## 2023-12-05 PROCEDURE — 3075F PR MOST RECENT SYSTOLIC BLOOD PRESS GE 130-139MM HG: ICD-10-PCS | Mod: CPTII,S$GLB,, | Performed by: OTOLARYNGOLOGY

## 2023-12-05 PROCEDURE — 3061F NEG MICROALBUMINURIA REV: CPT | Mod: CPTII,S$GLB,, | Performed by: OTOLARYNGOLOGY

## 2023-12-05 PROCEDURE — 3044F HG A1C LEVEL LT 7.0%: CPT | Mod: CPTII,S$GLB,, | Performed by: OTOLARYNGOLOGY

## 2023-12-05 PROCEDURE — 3079F DIAST BP 80-89 MM HG: CPT | Mod: CPTII,S$GLB,, | Performed by: OTOLARYNGOLOGY

## 2023-12-05 PROCEDURE — 3079F PR MOST RECENT DIASTOLIC BLOOD PRESSURE 80-89 MM HG: ICD-10-PCS | Mod: CPTII,S$GLB,, | Performed by: OTOLARYNGOLOGY

## 2023-12-05 PROCEDURE — 99214 OFFICE O/P EST MOD 30 MIN: CPT | Mod: S$GLB,,, | Performed by: OTOLARYNGOLOGY

## 2023-12-05 PROCEDURE — 92557 PR COMPREHENSIVE HEARING TEST: ICD-10-PCS | Mod: S$GLB,,, | Performed by: AUDIOLOGIST

## 2023-12-05 PROCEDURE — 3066F PR DOCUMENTATION OF TREATMENT FOR NEPHROPATHY: ICD-10-PCS | Mod: CPTII,S$GLB,, | Performed by: OTOLARYNGOLOGY

## 2023-12-05 PROCEDURE — 99999 PR PBB SHADOW E&M-EST. PATIENT-LVL IV: CPT | Mod: PBBFAC,,, | Performed by: OTOLARYNGOLOGY

## 2023-12-05 PROCEDURE — 99999 PR PBB SHADOW E&M-EST. PATIENT-LVL IV: ICD-10-PCS | Mod: PBBFAC,,, | Performed by: OTOLARYNGOLOGY

## 2023-12-05 PROCEDURE — 99999 PR PBB SHADOW E&M-EST. PATIENT-LVL I: ICD-10-PCS | Mod: PBBFAC,,, | Performed by: AUDIOLOGIST

## 2023-12-05 PROCEDURE — 99214 PR OFFICE/OUTPT VISIT, EST, LEVL IV, 30-39 MIN: ICD-10-PCS | Mod: S$GLB,,, | Performed by: OTOLARYNGOLOGY

## 2023-12-05 PROCEDURE — 99999 PR PBB SHADOW E&M-EST. PATIENT-LVL I: CPT | Mod: PBBFAC,,, | Performed by: AUDIOLOGIST

## 2023-12-05 PROCEDURE — 92567 TYMPANOMETRY: CPT | Mod: S$GLB,,, | Performed by: AUDIOLOGIST

## 2023-12-05 PROCEDURE — 3008F PR BODY MASS INDEX (BMI) DOCUMENTED: ICD-10-PCS | Mod: CPTII,S$GLB,, | Performed by: OTOLARYNGOLOGY

## 2023-12-05 NOTE — PROGRESS NOTES
Chief Complaint   Patient presents with    Follow-up    Hearing Loss     Left ear clogged    Tinnitus    Otalgia     Right ear       HPI:  Patient is a 63 y.o. female who has previously seen me for Eustachian tube dysfunction, hearing loss.  She would previously been managed by Dr. Puga who placed tubes in her ears.  She has a longstanding history of Eustachian tube dysfunction, but recent set of tubes did not seem to relieve symptoms of ear pressure.      Since the last visit, the patient reports left ear still feeling clogged.  Still with some issues with feeling pain the right ear.  She does not report bruxism, TMJ issues, dental problems.  She is still using daily nasal sprays and antihistamines for control of nasal symptoms.    Active Ambulatory Problems     Diagnosis Date Noted    Class 1 obesity due to excess calories with serious comorbidity and body mass index (BMI) of 33.0 to 33.9 in adult 08/10/2020    Type 2 diabetes mellitus with both eyes affected by moderate nonproliferative retinopathy without macular edema, with long-term current use of insulin 09/21/2020    Thyroid nodule 09/21/2020    Meralgia paresthetica, bilateral lower limbs 03/24/2023     Resolved Ambulatory Problems     Diagnosis Date Noted    Type 2 diabetes mellitus without complication, with long-term current use of insulin 08/10/2020    Impaired mobility and activities of daily living 10/13/2021     Past Medical History:   Diagnosis Date    Diabetes mellitus, type 2        Review of Systems  General: negative for chills, fever or weight loss  Psychological: negative for mood changes or depression  Ophthalmic: negative for blurry vision, photophobia or eye pain  ENT: see HPI  Respiratory: no cough, shortness of breath, or wheezing  Cardiovascular: no chest pain or dyspnea on exertion  Gastrointestinal: no abdominal pain, change in bowel habits, or black/ bloody stools  Musculoskeletal: negative for gait disturbance or muscular  weakness  Neurological: no syncope or seizures; no ataxia  Dermatological: negative for pruritis, rash and jaundice  Hematologic/lymphatic: no easy bruising, no new adenopathy    Physical Exam     Vitals:    12/05/23 0820   BP: 139/87   Pulse: 89         Constitutional:   She is oriented to person, place, and time. Vital signs are normal. She appears well-developed and well-nourished. She appears alert. She is cooperative. Normal speech.      Head:  Normocephalic and atraumatic. Salivary glands normal.  Facial strength is normal.      Ears:  Hearing normal to normal and whispered voice; external ear normal without scars, lesions, or masses; ear canal, tympanic membrane, and middle ear normal., right ear hearing normal to normal and whispered voice; external ear normal without scars, lesions, or masses; ear canal, tympanic membrane, and middle ear normal. and left ear hearing normal to normal and whispered voice; external ear normal without scars, lesions, or masses; ear canal, tympanic membrane, and middle ear normal..   Right Ear: No drainage. Tympanic membrane is not perforated and not erythematous. No middle ear effusion. A PE tube is seen.   Left Ear: No drainage. Tympanic membrane is not perforated and not erythematous.  No middle ear effusion. A PE tube is seen.     Nose:  Mucosal edema present. No rhinorrhea, septal deviation or polyps. Turbinate hypertrophy (3+, boggy, congested mucosa).  Turbinates normal.  Right sinus exhibits no maxillary sinus tenderness and no frontal sinus tenderness. Left sinus exhibits no maxillary sinus tenderness and no frontal sinus tenderness.     Mouth/Throat  Oropharynx clear and moist without lesions or asymmetry, lips, teeth, and gums normal and oropharynx normal. No mucous membrane lesions. No oropharyngeal exudate, posterior oropharyngeal edema or posterior oropharyngeal erythema. Mirror exam not performed due to patient tolerance.  Mirror exam not performed due to patient  tolerance.      Neck:  Neck normal without thyromegaly masses, asymmetry, normal tracheal structure, crepitus, and tenderness, thyroid normal, trachea normal, phonation normal, full range of motion with neck supple and no adenopathy. No JVD present. Carotid bruit is not present. No thyroid mass and no thyromegaly present.     She has no cervical adenopathy.     Cardiovascular:    Normal rate, regular rhythm and rate and rhythm, heart sounds, and pulses normal.              Pulmonary/Chest:   Effort and breath sounds normal.     Psychiatric:   She has a normal mood and affect. Her speech is normal and behavior is normal.     Neurological:   She is alert and oriented to person, place, and time. She has neurological normal, alert and oriented. No cranial nerve deficit.     Skin:   No abrasions, lacerations, lesions, or rashes.         Audiogram: Interpreted by me and reviewed with the patient today.  Mild bilateral sensorineural hearing loss.  Stable/improved since visit to outside audiologist in February 2023.  Tympanograms consistent with patent tympanostomy tubes.            Assessment/Plan:      ICD-10-CM ICD-9-CM    1. Dysfunction of Eustachian tube, unspecified laterality  H69.90 381.81 CT Temporal Bone without contrast      2. Otalgia, bilateral  H92.03 388.70 CT Temporal Bone without contrast      3. Sensorineural hearing loss (SNHL) of both ears  H90.3 389.18       4. Ear fullness, bilateral  H93.8X3 388.8           Continue nasal spray and oral antihistamines for control of nasal symptoms and Eustachian tube dysfunction.    We discussed the possibility of some component of hydrops that may be affecting the ear pressure, fluctuating hearing loss, aural fullness.  I gave her a copy of hydrops diet to try and see if this helps with symptoms.    Tube check in 4-6 months.  Annual audiogram.        Sarah Retana MD  [facility]

## 2023-12-05 NOTE — PATIENT INSTRUCTIONS
"CT of the temporal bones to rule out any TMJ or other temporal bone anomalies causing pain/ETD.   Continue nasal sprays and allergy medications.          Hydrops Diet    The primary goal of treatment is to provide stable body fluid/blood levels so that secondary fluctuations in the inner ear fluid can be avoided. The secondary goal of treatment is to avoid migraine trigger foods, as migraine is commonly associated with Meniere's disease.     Aim for a 1.5 gram sodium intake diet. High sodium intake results in fluctuations in the inner ear fluid pressure and may increase your symptoms. Aim for a diet high in fresh fruits, vegetables and whole grains, and low in canned, frozen or processed foods.   One teaspoon of table salt has about 2 grams of sodium. Note that sodium (one of the two elements in table salt) is not exactly the same as sodium chloride (salt). There are many other foods and chemicals that we ingest that contain sodium.     Drink adequate amounts of fluid daily. This should include water, milk and low-sugar fruit juices (for example, cranberry or cranapple). Try to anticipate fluid loss which will occur with exercise or heat, and replace these fluids before they are lost. Some studies suggest that drinking more water helps, perhaps because it dilutes out the salt. Don't overdo it though - -one can get "water intoxication".     Avoid caffeine-containing fluids and foods (such as coffee, tea and chocolate). Caffeine has stimulant properties that may make Meniere's symptoms worse. Caffeine also may make tinnitus louder. Two regular cups of coffee/day, or the equivalent in other beverages, is ususually safe however.     Limit your alcohol intake to one glass of beer or wine each day. Alcohol, especially red wine is a migraine trigger. Migraine and Meniere's are linked.     Avoid foods containing MSG (monosodium glutamate). This is often present in pre-packaged food products (such as flavored chips) and in " Chinese food.     Distribute your food and fluid intake evenly throughout the day and from day to day. Eat approximately the same amount of food at each meal and do not skip meals. If you eat snacks, have them at regular times.

## 2023-12-05 NOTE — PROGRESS NOTES
Kalpana Rivera was seen today in the clinic for an audiologic evaluation.  Her main complaint was feeling that her left ear was clogged and decreased hearing in the left ear.  She has bilateral PE tubes.    Tympanometry revealed a Type B tympanogram with a 6.46 ear canal volume for the left ear.  Tympanometry could not be tested for the right ear because a seal could not be obtained.  Audiogram results revealed a mild sensorineural hearing loss bilaterally.  Speech reception thresholds were obtained at 15dB for both ears and speech discrimination scores were 100% for both ears.    Recommendations:  Otologic evaluation  Annual evaluation  Hearing protection in noise

## 2023-12-10 ENCOUNTER — PATIENT MESSAGE (OUTPATIENT)
Dept: ENDOCRINOLOGY | Facility: CLINIC | Age: 63
End: 2023-12-10
Payer: COMMERCIAL

## 2023-12-10 DIAGNOSIS — E10.9 TYPE 1 DIABETES MELLITUS WITHOUT COMPLICATION: ICD-10-CM

## 2023-12-10 DIAGNOSIS — E11.3393 TYPE 2 DIABETES MELLITUS WITH BOTH EYES AFFECTED BY MODERATE NONPROLIFERATIVE RETINOPATHY WITHOUT MACULAR EDEMA, WITH LONG-TERM CURRENT USE OF INSULIN: Primary | ICD-10-CM

## 2023-12-10 DIAGNOSIS — E66.01 CLASS 2 SEVERE OBESITY WITH SERIOUS COMORBIDITY AND BODY MASS INDEX (BMI) OF 35.0 TO 35.9 IN ADULT, UNSPECIFIED OBESITY TYPE: ICD-10-CM

## 2023-12-10 DIAGNOSIS — E10.3393 TYPE 1 DIABETES MELLITUS WITH MODERATE NONPROLIFERATIVE RETINOPATHY OF BOTH EYES WITHOUT MACULAR EDEMA: ICD-10-CM

## 2023-12-10 DIAGNOSIS — Z79.4 TYPE 2 DIABETES MELLITUS WITH BOTH EYES AFFECTED BY MODERATE NONPROLIFERATIVE RETINOPATHY WITHOUT MACULAR EDEMA, WITH LONG-TERM CURRENT USE OF INSULIN: Primary | ICD-10-CM

## 2023-12-10 RX ORDER — BLOOD-GLUCOSE SENSOR
EACH MISCELLANEOUS
Qty: 3 EACH | Refills: 3 | Status: SHIPPED | OUTPATIENT
Start: 2023-12-10 | End: 2024-03-20 | Stop reason: SDUPTHER

## 2023-12-10 RX ORDER — INSULIN DEGLUDEC 100 U/ML
17 INJECTION, SOLUTION SUBCUTANEOUS DAILY
Qty: 6 ML | Refills: 10 | Status: SHIPPED | OUTPATIENT
Start: 2023-12-10 | End: 2024-03-20

## 2023-12-10 RX ORDER — TIRZEPATIDE 5 MG/.5ML
5 INJECTION, SOLUTION SUBCUTANEOUS
Qty: 4 PEN | Refills: 3 | Status: SHIPPED | OUTPATIENT
Start: 2023-12-10 | End: 2024-03-20 | Stop reason: SDUPTHER

## 2023-12-11 ENCOUNTER — PATIENT OUTREACH (OUTPATIENT)
Dept: ADMINISTRATIVE | Facility: HOSPITAL | Age: 63
End: 2023-12-11
Payer: COMMERCIAL

## 2023-12-13 ENCOUNTER — TELEPHONE (OUTPATIENT)
Dept: ENDOCRINOLOGY | Facility: CLINIC | Age: 63
End: 2023-12-13
Payer: COMMERCIAL

## 2023-12-13 NOTE — TELEPHONE ENCOUNTER
Returned the phone call of the patient to let her know we did a prior  authorization for patient and it was approved for the year 2024 until its time to get another approval. Patient had a nasty attitude towards me while explaining to her about the medication getting billed to the her insurance. I called the Lead RICARDO Marie and she also explained to the patient the same as I did. The patient had a nasty attitude towards her as well while helping her. We both let her know that her medication was approved for 365 days until its time for another PA.

## 2023-12-18 ENCOUNTER — PATIENT MESSAGE (OUTPATIENT)
Dept: ADMINISTRATIVE | Facility: HOSPITAL | Age: 63
End: 2023-12-18
Payer: COMMERCIAL

## 2023-12-29 ENCOUNTER — PATIENT MESSAGE (OUTPATIENT)
Dept: PODIATRY | Facility: CLINIC | Age: 63
End: 2023-12-29
Payer: COMMERCIAL

## 2024-01-04 ENCOUNTER — OFFICE VISIT (OUTPATIENT)
Dept: PODIATRY | Facility: CLINIC | Age: 64
End: 2024-01-04
Payer: COMMERCIAL

## 2024-01-04 DIAGNOSIS — M76.72 PERONEAL TENDINITIS OF LEFT LOWER EXTREMITY: Primary | ICD-10-CM

## 2024-01-04 DIAGNOSIS — M65.9 SYNOVITIS OF LEFT ANKLE: ICD-10-CM

## 2024-01-04 PROCEDURE — 1159F MED LIST DOCD IN RCRD: CPT | Mod: CPTII,S$GLB,, | Performed by: PODIATRIST

## 2024-01-04 PROCEDURE — 1160F RVW MEDS BY RX/DR IN RCRD: CPT | Mod: CPTII,S$GLB,, | Performed by: PODIATRIST

## 2024-01-04 PROCEDURE — 99213 OFFICE O/P EST LOW 20 MIN: CPT | Mod: S$GLB,,, | Performed by: PODIATRIST

## 2024-01-04 PROCEDURE — 99999 PR PBB SHADOW E&M-EST. PATIENT-LVL III: CPT | Mod: PBBFAC,,, | Performed by: PODIATRIST

## 2024-01-04 RX ORDER — MELOXICAM 7.5 MG/1
7.5 TABLET ORAL DAILY
Qty: 30 TABLET | Refills: 0 | Status: SHIPPED | OUTPATIENT
Start: 2024-01-04 | End: 2024-01-09

## 2024-01-04 NOTE — PATIENT INSTRUCTIONS
Joint Pain in the Foot  The foot contains 26 bones and more than 30 joints. Many people experience pain involving one or more of these joints. The pain may be accompanied by swelling, tenderness, stiffness, redness, bruising and/or increased warmth over the affected joints.    Joint pain may be caused by trauma, infection, inflammation, arthritis, bursitis, gout or structural foot problems. It is initially treated with rest, elevation and limitation of walking/weightbearing on the painful foot. Use of nonsteroidal anti-inflammatory drugs (NSAIDs), such as ibuprofen, and ice can help reduce local inflammation and pain. Custom orothotic devices may also be prescribed to support the foot and reduce pain. A foot and ankle surgeon can best determine the cause of joint pain and recommend the appropriate treatment.      Joint Swelling in the Foot  The foot contains 26 bones and more than 30 joints. The body's natural response to any type of joint injury is to increase blood flow to the affected area. This results in an accumulation of fluid in the tissues in and around the joint, resulting in swelling. Depending on the cause of the injury, joint swelling may be accompanied by stiffness, redness, warmth and pain.    Joint swelling and pain can also be referred to as capsulitis, bursitis or synovitis.    Joint swelling may also result from inflammatory, degenerative, traumatic, infective or crystal-forming joint diseases, such as gout. If joint swelling persists, a foot and ankle surgeon can best determine the cause and recommend the appropriate treatment.    Synovitis  Synovitis is an inflammation of the tissues that line a joint. It is commonly associated with specific diseases, such as arthritis or gout, but it may also be the result of overuse or trauma. Symptoms of synovitis may include redness, swelling, warmth and pain with joint motion.    Evaluation by a foot and ankle surgeon will help confirm the diagnosis and  will help rule out other possible concerns, such as fractures or infections. The surgeon may sample fluid from the joint to analyze for inflammatory cells or may order x-rays or other advanced imaging tests to better evaluate the affected joint.    Treatment for synovitis includes rest, ice, immobilization and oral nonsteroidal anti-inflammatory drugs (NSAIDs), such as ibuprofen, and may include steroid injections into the joint. Surgery may be indicated in longstanding cases.      What Is Capsulitis    Ligaments surrounding the joint form a capsule, which helps the joint to function properly. Capsulitis is a condition in which these ligaments have become inflamed. Capsulitis can also occur in the most any joint in the foot and ankle. This inflammation causes considerable discomfort and, if left untreated, can eventually lead to a weakening of surrounding ligaments that can cause dislocation.     Symptoms  Because capsulitis can be a progressive disorder and usually worsens if left untreated, early recognition and treatment are important. In the earlier stages--the best time to seek treatment--the symptoms may include:    Pain  Swelling in the area of pain  Difficulty wearing shoes  Pain when walking barefoot     In more advanced stages, the supportive ligaments weaken, leading to failure of the joint to stabilize. The unstable joint can lead to other problems, such as repetitive injury to the cartilage of a joint.    Diagnosis  In arriving at a diagnosis, the foot and ankle surgeon will examine the foot, press on it and maneuver it to reproduce the symptoms. The surgeon will also look for potential causes and test the stability of the joint. X-rays are usually ordered, and other imaging studies are sometimes needed.    Nonsurgical Treatment  The best time to treat capsulitis is during the early stages. At that time, nonsurgical approaches can be used to stabilize the joint, reduce the symptoms and address the  underlying cause of the condition.    The foot and ankle surgeon may select one or more of the following options for early treatment of capsulitis:    Rest and ice. Staying off the foot and applying ice packs help reduce the swelling and pain. Apply an ice pack, placing a thin towel between the ice and the skin. Use ice for 20 minutes and then wait at least 40 minutes before icing again.  Oral medications. Nonsteroidal anti-inflammatory drugs (NSAIDs), such as ibuprofen, may help relieve the pain and inflammation.  Taping/splinting. It may be necessary to tape the toe so that it will stay in the correct position. This helps relieve the pain and prevent further drifting of the toe.  Stretching. Stretching exercises may be prescribed for patients who have tight calf muscles.  Shoe modifications. Supportive shoes with stiff soles are recommended because they control the motion and lessen the amount of pressure on the ball of the foot.  Orthotic devices. Custom shoe inserts are often very beneficial. These include arch supports or a metatarsal pad that distributes the weight away from the joint.     When Is Surgery Needed?  Once the second toe starts moving toward the big toe, it will never go back to its normal position unless surgery is performed. The foot and ankle surgeon will select the procedure or combination of procedures best suited to the individual patient.    Nonsurgical Treatment  When you have joint inflammation or swelling, rehabilitation is crucial--and it starts the moment your treatment begins. Your foot and ankle surgeon may recommend one or more of the following treatment options:      -Rest. Stay off the injured ankle. Walking may cause further injury.  -Ice. Apply an ice pack to the injured area, placing a thin towel between the ice and the skin. Use ice for 20 minutes and then wait at least 40 minutes before icing again.  -Compression. An elastic wrap may be recommended to control  swelling.  -Elevation. The ankle should be raised slightly above the level of your heart to reduce swelling.  -Early physical therapy. Your doctor will start you on a rehabilitation program as soon as possible to promote healing and increase your range of motion. This includes doing prescribed exercises.  -Medications. Nonsteroidal anti-inflammatory drugs (NSAIDs), such as ibuprofen, may be recommended to reduce pain and inflammation. In some cases, prescription pain medications are needed to provide adequate relief.  -Immobilization. A period of limited or no weight bearing on the injured extremity.     When Is Surgery Needed?  In more severe cases, surgery may be required to adequately treat an injury to the foot or ankle. Surgery often involves repairing the damaged structures, ligament or ligaments. The foot and ankle surgeon will select the surgical procedure best suited for your case based on the type and severity of your injury as well as your activity level.    After surgery, rehabilitation is extremely important. Completing your rehabilitation program is crucial to a successful outcome. Be sure to continue to see your foot and ankle surgeon during this period to ensure that your ankle heals properly and function is restored.      R.I.C.E. Protocol      R.I.C.E. stands for Rest, Ice, Compression, and Elevation. Doing these things helps limit pain and swelling after an injury. R.I.C.E. also helps injuries heal faster. Use R.I.C.E. for sprains, strains, and severe bruises, joint pain/swelling, or foot/ankle inflammation. Follow the tips on this handout and begin R.I.C.E. as soon as possible after an injury.  []   Rest  Pain is your body's way of telling you to rest an injured area. Whether you have hurt an elbow, hand, foot, or knee, limiting its use will prevent further injury and help you heal.  []   Ice  Applying ice right after an injury helps prevent swelling and reduce pain. Don't place ice directly on  your skin.  Wrap a cold pack or bag of ice in a thin cloth. Place it over the injured area.  Ice for 10 minutes every 3 hours. Don't ice for more than 20 minutes at a time.  []   Compression  Putting pressure (compression) on an injury helps prevent swelling and provides support.  Wrap the injured area firmly with an elastic bandage. If your hand or foot tingles, becomes discolored, or feels cold to the touch, the bandage may be too tight. Rewrap it more loosely.  If your bandage becomes too loose, rewrap it.  Do not wear an elastic bandage overnight.  []   Elevation  Keeping an injury elevated helps reduce swelling, pain, and throbbing. Elevation is most effective when the injury is kept elevated higher than the heart.     Call your healthcare provider if you notice any of the following:  Fingers or toes feel numb, are cold to the touch, or change color  Skin looks shiny or tight  Pain, swelling, or bruising worsens and is not improved with elevation     Peroneal Tendon Injuries  What Are the Peroneal Tendons?         A tendon is a band of tissue that connects a muscle to a bone. The two peroneal tendons in the foot run side by side behind the outer ankle bone. One peroneal tendon attaches to the outer part of the midfoot, while the other tendon runs under the foot and attaches near the inside of the arch. The main function of the peroneal tendons is to stabilize the foot and ankle and protect them from sprains.    Causes & Symptoms of Peroneal Tendon Injuries  Peroneal tendon injuries may be acute (occurring suddenly) or chronic (developing over a period of time). They most commonly occur in individuals who participate in sports that involve repetitive ankle motion. In addition, people with higher arches are at risk for developing peroneal tendon injuries. Basic types of peroneal tendon injuries are tendonitis, tears and subluxation.    Tendonitis is an inflammation of one or both tendons. The inflammation is  caused by activities involving repetitive use of the tendon, overuse of the tendon or trauma (such as an ankle sprain). Symptoms of tendonitis include:    Pain  Swelling  Warm to the touch     Acute tears are caused by repetitive activity or trauma. Immediate symptoms of acute tears include:    Pain  Swelling  Weakness or instability of the foot and ankle     As time goes on, these tears may lead to a change in the shape of the foot in which the arch may become higher.    Degenerative tears (tendonosis) are usually due to overuse and occur over long periods of time, often years. In degenerative tears, the tendon is like taffy that has been overstretched until it becomes thin and eventually frays. Having high arches also puts you at risk for developing a degenerative tear. The symptoms of degenerative tears may include:    Sporadic pain (occurring from time to time) on the outside of the ankle  Weakness or instability in the ankle  An increase in the height of the arch     Subluxation means one or both tendons have slipped out of their normal position. In some cases, subluxation is due to a condition in which a person is born with a variation in the shape of the bone or muscle. In other cases, subluxation occurs following trauma, such as an ankle sprain. Damage or injury to the tissues that stabilize the tendons (retinaculum) can lead to chronic tendon subluxation. The symptoms of subluxation may include:    A snapping feeling of the tendon around the ankle bone  Sporadic pain behind the outside ankle bone  Ankle instability or weakness     Early treatment of a subluxation is critical since a tendon that continues to sublux (move out of position) is more likely to tear or rupture. Therefore, if you feel the characteristic snapping, see a foot and ankle surgeon immediately.    Diagnosis  Because peroneal tendon injuries are sometimes misdiagnosed and may worsen without proper treatment, prompt evaluation by a foot and  ankle surgeon is advised. To diagnose a peroneal tendon injury, the surgeon will examine the foot and look for pain, instability, swelling, warmth and weakness on the outer side of the ankle. In addition, an x-ray or other advanced imaging studies may be needed to fully evaluate the injury. The foot and ankle surgeon will also look for signs of an ankle sprain and other related injuries that sometimes accompany a peroneal tendon injury. Proper diagnosis is important because prolonged discomfort after a simple sprain may be a sign of additional problems.    Nonsurgical Treatment  Treatment depends on the type of peroneal tendon injury. Options include:    Immobilization. A cast or splint may be used to keep the foot and ankle from moving and allow the injury to heal.  Medications. Oral or injected anti-inflammatory drugs may help relieve pain and inflammation.  Physical therapy. Ice, heat or ultrasound therapy may be used to reduce swelling and pain. As symptoms improve, exercises can be added to strengthen the muscles and improve range of motion and balance.  Bracing. The surgeon may provide a brace to use for a short while or during activities requiring repetitive ankle motion. Bracing may also be an option when a patient is not a candidate for surgery.     When Is Surgery Needed?  In some cases, surgery may be needed to repair the tendon or tendons and perhaps the supporting structures of the foot. The foot and ankle surgeon will determine the most appropriate procedure for the patients condition and lifestyle. After surgery, physical therapy is an important part of rehabilitation.      What is Tendonitis of the Foot?  When you use a set of muscles too much, youre likely to strain the tendons (soft tissues) that connect those muscles to your bones. At first, pain or swelling may come and go quickly. But if you do too much too soon, your muscles may overtire again. The strain may cause a tendons outer covering  to swell or small fibers in a tendon to pull apart. If you keep pushing your muscles, damage to the tendons adds up, and tendonitis develops. Over time, pain and swelling may limit your activities. But with your doctors help, tendonitis can be controlled. Both your symptoms and your risk of future problems including tendon rupture can be reduced.       The back of your foot  The Achilles tendon connects the calf muscle to the heel bone. If tendonitis occurs here, you may feel pain when your foot touches down or when your heel lifts off the ground.   The front of your foot  The anterior tibial tendon helps control the front of your foot when it meets the ground. If this tendon is strained, you may feel pain when you go down stairs or walk or run on hills.     The inside of your foot  The posterior tibial tendon runs along the inside of the ankle and foot. If this tendon is strained, your foot may hurt when it moves forward to push off the ground. Or you may feel pain when your heel shifts from side to side.   The outside of your foot  The peroneal tendon wraps across the bottom of your foot, from the outside to the inside. Tendonitis here may cause pain when you stand or push off the ground and when walking on uneven surfaces.   Date Last Reviewed: 9/21/2015  © 5096-5182 [employer]. [address] [place] [address], [place] [address]. All rights reserved. This information is not intended as a substitute for professional medical care. Always follow your healthcare professional's instructions.        Treating Tendonitis of the Foot  Your healthcare provider's first concern is to reduce your symptoms. Using ice and heat, taking medicines, and limiting activity help control pain and swelling. Follow all of your healthcare provider's instructions. Returning to activity too soon may cause your symptoms to come back.    Ice and heat  Ice helps prevent swelling and reduce pain. Place ice on the painful area for 10 to  15 minutes. Repeat the icing several times a day. If ?you have had the problem for a while, using heat may help. Apply a heating pad or hot towels to the tendon for 20 to 30 minutes 2 or 3 times a day.  Medicines  Your healthcare provider may tell you to take ibuprofen or other anti-inflammatory medicines. These reduce pain and swelling. Take them as directed. Dont wait until you feel pain. In more severe cases, cortisone may be injected to relieve pain.  Limiting activities  Rest allows the tissues in your foot to heal. Stay off your feet for a few days, then slowly work back into activity. If you do high-impact activities, such as running or aerobics, try other activities that place less strain on your foot. Cycling and swimming are good choices.  Date Last Reviewed: 9/21/2015  © 6693-9520 [employer]. [address] [place] [address], [place] [address]. All rights reserved. This information is not intended as a substitute for professional medical care. Always follow your healthcare professional's instructions.

## 2024-01-04 NOTE — PROGRESS NOTES
[facility]  [address]  [address]  [facility] [place] [address]  Dept: [phone]  Dept Fax: [phone]    Rodney Orozco Jr., DPMITESH     Assessment:   MDM    Coding  1. Peroneal tendinitis of left lower extremity  MRI Foot (Hindfoot) Right Without Contrast    meloxicam (MOBIC) 7.5 MG tablet      2. Synovitis of left ankle  MRI Foot (Hindfoot) Right Without Contrast    meloxicam (MOBIC) 7.5 MG tablet          Plan:     Procedures  1. Peroneal tendinitis of left lower extremity  -     MRI Foot ([place]indfoot) Right Without Contrast; Future; Expected date: 01/04/2024  -     meloxicam (MOBIC) 7.5 MG tablet; Take 1 tablet (7.5 mg total) by mouth once daily.  Dispense: 30 tablet; Refill: 0    2. Synovitis of left ankle  -     MRI Foot (Hindfoot) Right Without Contrast; Future; Expected date: 01/04/2024  -     meloxicam (MOBIC) 7.5 MG tablet; Take 1 tablet (7.5 mg total) by mouth once daily.  Dispense: 30 tablet; Refill: 0      Kalpana was seen today for follow-up.    Diagnoses and all orders for this visit:    Peroneal tendinitis of left lower extremity  -     MRI Foot (Hindfoot) Right Without Contrast; Future  -     meloxicam (MOBIC) 7.5 MG tablet; Take 1 tablet (7.5 mg total) by mouth once daily.    Synovitis of left ankle  -     MRI Foot (Hindfoot) Right Without Contrast; Future  -     meloxicam (MOBIC) 7.5 MG tablet; Take 1 tablet (7.5 mg total) by mouth once daily.        -pt seen, evaluated, and managed  -dx discussed in detail. All questions/concerns addressed  -all tx options discussed. All alternatives, risks, benefits of all txs discussed  -We discussed conservative care options possible including but not limited to shoe wear and/or padding, bracing/strapping, at home ROM, formal PT, medical therapy, injection therapy  - The utilization of NSAIDs can be considered but their benefit has to be tempered against the risk of GI/ concerns  - A steroid injection can be undertaken.  We did  discuss the potential mechanism of action of this shot.  Understanding that multiple injections at the same anatomic site do have deleterious effects on the soft tissue.  Generic risks include: steroid flare (advised to ice if necessary), skin hypo-pgimentation (which can be permanent and unsightly), elevation of blood sugar, subcutaneous atrophy (can be permanent) and infection.   -XR/imaging reviewed by me: agree with read  -labs reviewed by me: ok for vgel  -implemented icing/stretching regimen  - ASO  dispensed - use prn  -MRI ordered to better characterize    -rxs dispensed: mobic  -referrals: none  -WB: wbat       Follow up in about 4 weeks (around 2024).    Subjective:      Patient ID: Kalpana Rivera is a 63 y.o. female.    Chief Complaint:   Chief Complaint   Patient presents with    Follow-up     On left ankle        CC - foot pain: patient presents to the podiatry clinic  with complaint of  left foot pain. Onset of the symptoms was several months ago. Precipitating event: unk. Current symptoms include: ability to bear weight, but with some pain, stiffness, swelling and worsening symptoms after a period of activity. Aggravating factors: walking and certain shoegear. Symptoms have gradually worsened. Patient has had no prior foot problems. Evaluation to date: none. Treatment to date: avoidance of offending activity. Patients rates pain 6/10 on pain scale.        24:  Hx as above. F/u for PL/PB tendonitis. Txd conservatively but now has recurrence of symptoms.    Ankle Pain     Follow-up        Last Podiatry Enc: Visit date not found  Last Enc w/ Me: Visit date not found    Outside reports reviewed: historical medical records.  Family hx: as below  Past Medical History:   Diagnosis Date    Diabetes mellitus, type 2      Past Surgical History:   Procedure Laterality Date    CARPAL TUNNEL RELEASE       SECTION      2    left thumb      right middle finger      right shoulder       Family  "History   Problem Relation Age of Onset    Diabetes Mother     Parkinsonism Mother     Alzheimer's disease Mother     Diabetes Father     Cancer Father      Current Outpatient Medications   Medication Sig Dispense Refill    blood-glucose sensor (DEXCOM G7 SENSOR) Brittany To change every 10 days 3 each 3    cetirizine (ZYRTEC) 10 MG tablet Take 10 mg by mouth once daily.      fluticasone propionate (FLONASE) 50 mcg/actuation nasal spray 1 spray by Each Nostril route once daily.      insulin aspart U-100 (NOVOLOG FLEXPEN U-100 INSULIN) 100 unit/mL (3 mL) InPn pen INJECT 3 UNITS WITH BREAKFAST, 5 UNITS WITH LUNCH, AND 5 UNITS WITH DINNER, INJECT 1-2 UNITS WITH SNACKS. 45 mL 1    insulin degludec (TRESIBA U-100 INSULIN) 100 unit/mL injection Inject 0.17 mLs (17 Units total) into the skin once daily. 6 mL 10    Lactobacillus rhamnosus GG (CULTURELLE) 10 billion cell capsule Take 1 capsule by mouth once daily.      metFORMIN (GLUCOPHAGE-XR) 500 MG ER 24hr tablet Take 2 tablets (1,000 mg total) by mouth 2 (two) times daily with meals. 360 tablet 1    multivitamin with minerals tablet Take 1 tablet by mouth once daily.      pen needle, diabetic (BD ULTRA-FINE SHORT PEN NEEDLE) 31 gauge x 5/16" Ndle USE TO INJECT INSULIN FOUR TIMES A  each 1    pravastatin (PRAVACHOL) 10 MG tablet Take 10 mg by mouth every evening.      tirzepatide (MOUNJARO) 5 mg/0.5 mL PnIj Inject 5 mg (one pen) into the skin every 7 days. 4 Pen 3    TURMERIC ORAL Take by mouth.      UNABLE TO FIND Cranberry & Buchu natural supplement      UNABLE TO FIND Nerve Eight natural supplement      UNABLE TO FIND Mullein natural supplement      VITAMIN B COMPLEX ORAL Take 1 tablet by mouth.      meloxicam (MOBIC) 7.5 MG tablet Take 1 tablet (7.5 mg total) by mouth once daily. 30 tablet 0     No current facility-administered medications for this visit.     Review of patient's allergies indicates:   Allergen Reactions    Azithromycin Anaphylaxis     Social " History     Socioeconomic History    Marital status:    Tobacco Use    Smoking status: Never    Smokeless tobacco: Never   Substance and Sexual Activity    Alcohol use: Yes    Drug use: Never       ROS    REVIEW OF SYSTEMS: Negative as documented below as well as positive findings in bold.       Constitutional  Respiratory  Gastrointestinal  Skin   - Fever - Cough - Heartburn - Rash   - Chills - Spit blood - Nausea - Itching   - Weight Loss - Shortness of breath - Vomiting - Nail pain   - Malaise/Fatigue - Wheezing - Abdominal Pain  Wound/Ulcer   - Weight Gain   - Blood in Stool  Poor wound healing       - Diarrhea          Cardiovascular  Genitourinary  Neurological  HEENT   - Chest Pain - Dysuria - Burning Sensation of feet - Headache   - Palpitations - Hematuria - Tingling / Paresthesia - Congestion   - Pain at night in legs - Flank Pain - Dizziness - Sore Throat   - Cramping   - Tremor - Blurred Vision   - Leg Swelling   - Sensory Change - Double Vision   - Dizzy when standing   - Speech Change - Eye Redness       - Focal Weakness - Dry Eyes       - Loss of Consciousness          Endocrine  Musculoskeletal  Psychiatric   - Cold intolerance - Muscle Pain - Depression   - Heat intolerance - Neck Pain - Insomnia   - Anemia - Joint Pain - Memory Loss   -  Easy bruising, bleeding - Heel pain - Anxiety      Toe Pain        Leg/Ankle/Foot Pain         Objective:     There were no vitals taken for this visit.  There were no vitals filed for this visit.        Physical Exam    General Appearance:   Patient appears well developed, well nourished  Patient appears stated age    Psychiatric:   Patient is oriented to time, place, and person.  Patient has appropriate mood and affect    Neck:  Trachea Midline  No visible masses    Respiratory/Ears:  No distress or labored breathing.  Able to differentiate between normal talking voice and whisper.  Able to follow commands    Eyes:  Visual Acuity intact  Lids and  conjunctivae normal. No discoloration noted.    Foot Exam  Physical Exam  Ortho Exam  Ortho/SPM Exam  Physical Exam  Foot/Ankle Musculoskeletal Exam    L LE exam con't:  V:  DP 2/4, PT 2/4   CRT< 3s to all digits tested   Tibial and popliteal lymph nodes are w/o abnormality   Edema: absent, varicosities: present    N:  Patient displays normal ankle reflexes   SILT in SP/DP/T/Cody/Saph distributions    Ortho: +Motor EHL/FHL/TA/GA   No major/gross deformity present   There is no decreased ROM at the ankle or ST joints: pain is not present, crepitus is not present  There is moderate pain with palpation of PL/PB in retromal area  +TTP CFL - no instability present  Compartments soft/compressible. No pain on passive stretch of big toe. No calf  Pain.    Derm:  skin intact, skin warm and dry, skin without ulcers or lesions, skin without induration, nails normal, no erythema and no ecchymosis    Imaging / Labs:      X-Ray Ankle Complete Left    Result Date: 10/5/2023  EXAM: XR ANKLE COMPLETE 3 VIEW LEFT CLINICAL HISTORY: [M79.672]-Pain in left foot. FINDINGS: 3 views of the left ankle.  No acute fracture or dislocation.  Ankle mortise is maintained.  No significant degenerative changes. IMPRESSION: No acute finding. Finalized on: 10/5/2023 9:35 AM By:  Reynaldo Marmolejo MD [facility]# [id number]      2023-10-05 09:37:[id number]    [facility]        Note: This was dictated using a computer transcription program. Although proofread, it may contain computer transcription errors and phonetic errors. Other human proofreading errors may also exist. Corrections may be performed at a later time. Please contact us for any clarification if needed.    Rodney Orozco DPM  [facility]

## 2024-01-09 ENCOUNTER — OFFICE VISIT (OUTPATIENT)
Dept: FAMILY MEDICINE | Facility: CLINIC | Age: 64
End: 2024-01-09
Payer: COMMERCIAL

## 2024-01-09 ENCOUNTER — PATIENT MESSAGE (OUTPATIENT)
Dept: FAMILY MEDICINE | Facility: CLINIC | Age: 64
End: 2024-01-09

## 2024-01-09 VITALS
TEMPERATURE: 98 F | HEART RATE: 108 BPM | SYSTOLIC BLOOD PRESSURE: 122 MMHG | HEIGHT: 59 IN | DIASTOLIC BLOOD PRESSURE: 74 MMHG | BODY MASS INDEX: 29.86 KG/M2 | OXYGEN SATURATION: 98 % | WEIGHT: 148.13 LBS

## 2024-01-09 DIAGNOSIS — Z86.39 HISTORY OF OBESITY: ICD-10-CM

## 2024-01-09 DIAGNOSIS — Z00.00 WELLNESS EXAMINATION: Primary | ICD-10-CM

## 2024-01-09 DIAGNOSIS — Z79.4 TYPE 2 DIABETES MELLITUS WITH BOTH EYES AFFECTED BY MODERATE NONPROLIFERATIVE RETINOPATHY WITHOUT MACULAR EDEMA, WITH LONG-TERM CURRENT USE OF INSULIN: ICD-10-CM

## 2024-01-09 DIAGNOSIS — E11.3393 TYPE 2 DIABETES MELLITUS WITH BOTH EYES AFFECTED BY MODERATE NONPROLIFERATIVE RETINOPATHY WITHOUT MACULAR EDEMA, WITH LONG-TERM CURRENT USE OF INSULIN: ICD-10-CM

## 2024-01-09 DIAGNOSIS — E04.1 THYROID NODULE: ICD-10-CM

## 2024-01-09 DIAGNOSIS — G57.13 MERALGIA PARESTHETICA, BILATERAL LOWER LIMBS: ICD-10-CM

## 2024-01-09 PROBLEM — E66.01 CLASS 2 SEVERE OBESITY WITH SERIOUS COMORBIDITY AND BODY MASS INDEX (BMI) OF 35.0 TO 35.9 IN ADULT, UNSPECIFIED OBESITY TYPE: Status: RESOLVED | Noted: 2024-01-09 | Resolved: 2024-01-09

## 2024-01-09 PROBLEM — E66.01 CLASS 2 SEVERE OBESITY WITH SERIOUS COMORBIDITY AND BODY MASS INDEX (BMI) OF 35.0 TO 35.9 IN ADULT, UNSPECIFIED OBESITY TYPE: Status: ACTIVE | Noted: 2024-01-09

## 2024-01-09 PROBLEM — E66.812 CLASS 2 SEVERE OBESITY WITH SERIOUS COMORBIDITY AND BODY MASS INDEX (BMI) OF 35.0 TO 35.9 IN ADULT, UNSPECIFIED OBESITY TYPE: Status: RESOLVED | Noted: 2024-01-09 | Resolved: 2024-01-09

## 2024-01-09 PROBLEM — E66.812 CLASS 2 SEVERE OBESITY WITH SERIOUS COMORBIDITY AND BODY MASS INDEX (BMI) OF 35.0 TO 35.9 IN ADULT, UNSPECIFIED OBESITY TYPE: Status: ACTIVE | Noted: 2024-01-09

## 2024-01-09 PROCEDURE — 99999 PR PBB SHADOW E&M-EST. PATIENT-LVL IV: CPT | Mod: PBBFAC,,, | Performed by: STUDENT IN AN ORGANIZED HEALTH CARE EDUCATION/TRAINING PROGRAM

## 2024-01-09 PROCEDURE — 3008F BODY MASS INDEX DOCD: CPT | Mod: CPTII,S$GLB,, | Performed by: STUDENT IN AN ORGANIZED HEALTH CARE EDUCATION/TRAINING PROGRAM

## 2024-01-09 PROCEDURE — 1159F MED LIST DOCD IN RCRD: CPT | Mod: CPTII,S$GLB,, | Performed by: STUDENT IN AN ORGANIZED HEALTH CARE EDUCATION/TRAINING PROGRAM

## 2024-01-09 PROCEDURE — 3078F DIAST BP <80 MM HG: CPT | Mod: CPTII,S$GLB,, | Performed by: STUDENT IN AN ORGANIZED HEALTH CARE EDUCATION/TRAINING PROGRAM

## 2024-01-09 PROCEDURE — 3074F SYST BP LT 130 MM HG: CPT | Mod: CPTII,S$GLB,, | Performed by: STUDENT IN AN ORGANIZED HEALTH CARE EDUCATION/TRAINING PROGRAM

## 2024-01-09 PROCEDURE — 99396 PREV VISIT EST AGE 40-64: CPT | Mod: S$GLB,,, | Performed by: STUDENT IN AN ORGANIZED HEALTH CARE EDUCATION/TRAINING PROGRAM

## 2024-01-09 PROCEDURE — 1160F RVW MEDS BY RX/DR IN RCRD: CPT | Mod: CPTII,S$GLB,, | Performed by: STUDENT IN AN ORGANIZED HEALTH CARE EDUCATION/TRAINING PROGRAM

## 2024-01-09 NOTE — PROGRESS NOTES
Subjective:       Patient ID: Kalpana Rivera is a 63 y.o. female.    Chief Complaint: Follow-up (Pt here for a 6 month follow up )      Active Problem List with Overview Notes    Diagnosis Date Noted    Meralgia paresthetica, bilateral lower limbs 03/24/2023     Symptoms have been much better with the loss of 20 pounds  Does have upcoming trip to [place] which will be a test of the symptoms   Advised to rest when having pain; ice and NSAIDs as needed      Type 2 diabetes mellitus with both eyes affected by moderate nonproliferative retinopathy without macular edema, with long-term current use of insulin 09/21/2020     Managed by endo   Mounjaro 5mg   tresiba 16 units QHS; novolog SS with meals   Metformin 1000 BID   No ACE/ARB BP not able to tolerate  Pravastatin every other day   dexCom  G7      Thyroid nodule 09/21/2020     Managed by endo   Stable  Asymptomatic       History of obesity 08/10/2020     Mounjaro 5mg has really helped with weightloss in addition to the diet and exercise  She is doing overall a lot better and feels great            Review of Systems   All other systems reviewed and are negative.       A1C:  Recent Labs   Lab 01/30/23  0830 05/30/23  0923 10/06/23  0833   Hemoglobin A1C 6.8 H 6.4 H 6.4 H     CBC:      CMP:  Recent Labs   Lab 01/30/23  0830 05/30/23  0923 10/06/23  0833   Glucose 133 H 129 H 154 H   Calcium 9.6 9.6 9.5   Albumin 3.8 3.9 4.5   Total Protein 6.8 6.8 7.4   Sodium 141 141 140   Potassium 4.1 4.6 3.9   CO2 22 L 28 28   Chloride 106 105 99   BUN 9 8 10   Creatinine 0.6 0.6 0.57   Alkaline Phosphatase 77 72 58   ALT 25 22 27   AST 22 19 27   Total Bilirubin 0.3 0.4 0.7     LIPIDS:  Recent Labs   Lab 03/28/22  0745 01/30/23  0830 10/06/23  0833   TSH  --  2.069  --    HDL 61  --  70   Cholesterol 134  --  140   Triglycerides 38  --  43   LDL Cholesterol 65.4  --  61.4 L   HDL/Cholesterol Ratio 45.5  --  50.0   Non-HDL Cholesterol 73  --  70   Total Cholesterol/HDL  Ratio 2.2  --  2.0     TSH:  Recent Labs   Lab 01/30/23  0830   TSH 2.069        Objective:      Vitals:    01/09/24 0823   BP: 122/74   Pulse: 108   Temp: 98.1 °F (36.7 °C)      Physical Exam  Vitals reviewed.   Constitutional:       Appearance: Normal appearance. She is overweight.   HENT:      Head: Normocephalic and atraumatic.   Eyes:      Conjunctiva/sclera: Conjunctivae normal.   Cardiovascular:      Rate and Rhythm: Normal rate and regular rhythm.      Heart sounds: Normal heart sounds.   Pulmonary:      Effort: Pulmonary effort is normal.      Breath sounds: Normal breath sounds.   Abdominal:      Palpations: Abdomen is soft.      Tenderness: There is no abdominal tenderness.   Musculoskeletal:         General: Normal range of motion.      Cervical back: Normal range of motion.      Right lower leg: No edema.      Left lower leg: No edema.   Neurological:      Mental Status: She is alert. Mental status is at baseline.   Psychiatric:         Mood and Affect: Mood normal.         Behavior: Behavior normal.          Assessment:       1. Wellness examination    2. Type 2 diabetes mellitus with both eyes affected by moderate nonproliferative retinopathy without macular edema, with long-term current use of insulin    3. Thyroid nodule    4. History of obesity    5. Meralgia paresthetica, bilateral lower limbs        Plan:   1. Wellness examination    2. Type 2 diabetes mellitus with both eyes affected by moderate nonproliferative retinopathy without macular edema, with long-term current use of insulin    3. Thyroid nodule    4. History of obesity    5. Meralgia paresthetica, bilateral lower limbs     Well female  Labs reviewed in detail  HM discussed  UTD; will resent colonoscopy records as has completed 2019 and good until 2029  Continue healthy lifestyle efforts  Continue current meds as prescribed otherwise; refills per request  Keep routine specialist f/u   RTC in 1 year with labs prior and/or PRN           Omayra Castillo   [facility]   1/9/24

## 2024-01-10 ENCOUNTER — PATIENT OUTREACH (OUTPATIENT)
Dept: ADMINISTRATIVE | Facility: HOSPITAL | Age: 64
End: 2024-01-10
Payer: COMMERCIAL

## 2024-01-10 ENCOUNTER — TELEPHONE (OUTPATIENT)
Dept: FAMILY MEDICINE | Facility: CLINIC | Age: 64
End: 2024-01-10
Payer: COMMERCIAL

## 2024-01-10 NOTE — TELEPHONE ENCOUNTER
Please advise message. Thanks       I was able to get the information about the colonoscopy I had in 2019.  It was done on 12/5/2019 at [facility].  The GI physician was Linsey García.  Please note this in my file.  Please let me know if there are any questions.

## 2024-01-10 NOTE — LETTER
AUTHORIZATION FOR RELEASE OF   CONFIDENTIAL INFORMATION    Dr. García,    We are seeing Kalpana Rivera, date of birth 1960, in the clinic at [facility]. Omayra Castillo DO is the patient's PCP. Kalpana Rivera has an outstanding lab/procedure at the time we reviewed her chart. In order to help keep her health information updated, she has authorized us to request the following medical record(s):                                        ( xx )  COLONOSCOPY           Please fax records to [facility], Champagne, Sarah A., DO, [phone].     If you have any questions, please contact Trisha Albrecht, Care Coordinator  at [phone].                Patient Name: Kalpana Rivera  : 1960  Patient Phone #: [phone]

## 2024-01-10 NOTE — PROGRESS NOTES
Non-compliant GAP report chart review - Chart review completed for the following HM test if overdue  (Mammogram, Colonoscopy, Cervical Cancer Screening,  Diabetic lab testing, and/or Dilated EYE EXAM)  01/10/2024    Care Everywhere and Media reports - updates requested and reviewed.           Requested  Colonoscopy records         Health Maintenance Due   Topic Date Due    Hepatitis C Screening  Never done    HIV Screening  Never done    Colorectal Cancer Screening  Never done    Shingles Vaccine (1 of 2) Never done    RSV Vaccine (Age 60+ and Pregnant patients) (1 - 1-dose 60+ series) Never done    COVID-19 Vaccine (3 - 2023-24 season) 09/01/2023    Foot Exam  01/30/2024    Mammogram  04/04/2024

## 2024-01-12 ENCOUNTER — PATIENT OUTREACH (OUTPATIENT)
Dept: ADMINISTRATIVE | Facility: HOSPITAL | Age: 64
End: 2024-01-12
Payer: COMMERCIAL

## 2024-01-12 ENCOUNTER — TELEPHONE (OUTPATIENT)
Dept: OPHTHALMOLOGY | Facility: CLINIC | Age: 64
End: 2024-01-12
Payer: COMMERCIAL

## 2024-01-12 LAB — CRC RECOMMENDATION EXT: NORMAL

## 2024-01-12 NOTE — TELEPHONE ENCOUNTER
----- Message from Sivan Adam sent at 1/10/2024  2:29 PM CST -----  Regarding: Appt Request  Patient called in regards to scheduling from referral Type 2 diabetes mellitus with both eyes affected by moderate nonproliferative retinopathy without macular edema, with long-term current use of insulin. Pt asked if she could schedule with  due to wanting to go to [place] location and wanting to be seen by a female Md. Pt will also go to [place].    Please call back to further assist- [phone]

## 2024-02-16 DIAGNOSIS — E10.9 TYPE 1 DIABETES MELLITUS WITHOUT COMPLICATION: ICD-10-CM

## 2024-02-16 RX ORDER — METFORMIN HYDROCHLORIDE 500 MG/1
TABLET, EXTENDED RELEASE ORAL
Qty: 360 TABLET | Refills: 1 | Status: SHIPPED | OUTPATIENT
Start: 2024-02-16 | End: 2024-03-07 | Stop reason: SDUPTHER

## 2024-03-07 DIAGNOSIS — E10.9 TYPE 1 DIABETES MELLITUS WITHOUT COMPLICATION: ICD-10-CM

## 2024-03-07 RX ORDER — METFORMIN HYDROCHLORIDE 500 MG/1
TABLET, EXTENDED RELEASE ORAL
Qty: 360 TABLET | Refills: 1 | Status: SHIPPED | OUTPATIENT
Start: 2024-03-07 | End: 2024-03-20 | Stop reason: SDUPTHER

## 2024-03-13 DIAGNOSIS — E10.9 TYPE 1 DIABETES MELLITUS WITHOUT COMPLICATION: ICD-10-CM

## 2024-03-14 RX ORDER — PEN NEEDLE, DIABETIC 30 GX3/16"
NEEDLE, DISPOSABLE MISCELLANEOUS
Qty: 200 EACH | Refills: 1 | Status: SHIPPED | OUTPATIENT
Start: 2024-03-14 | End: 2024-03-20 | Stop reason: SDUPTHER

## 2024-03-19 NOTE — PROGRESS NOTES
Subjective:      Patient ID: Kalpana Rivera is a 63 y.o. female.    Chief Complaint:  No chief complaint on file.    History of Present Illness  Kalpana Rivera presents today for follow up of JULIANN now managed as type 1 diabetic-insulin dependent. Previous patient of Dr. Zeng and myself. Last seen in Nov 2023     Ref. Range 9/14/2020 07:45   Glucose Latest Ref Range: 65 - 99 mg/dL 154 (H)      Latest Reference Range & Units 09/14/20 07:45   C-Peptide 0.80 - 3.85 ng/mL 0.10 (L)   (L): Data is abnormally low   Latest Reference Range & Units 09/14/20 07:45   Glutamate decarboxylase 65 Ab <5 IU/mL 97 (H)   (H): Data is abnormally high    She is in left boot today due to fall in April 2023. She will need to be in ankle brace and do PT.    Started using Mounjaro in June 2023 when she ran out of current supply of Trulicity. She is using dexcom G7 and likes it but wearing on abdomen.     She has reduced her tresiba incrementally due to low blood sugars since starting Mounjaro and noticing hypoglycemia overnight. She has lost 15 pounds on Mounjaro since starting.     Wt Readings from Last 3 Encounters:   03/20/24 1534 63.8 kg (140 lb 10.5 oz)   01/09/24 0823 67.2 kg (148 lb 2.4 oz)   12/05/23 0820 68.2 kg (150 lb 3.9 oz)     With regards to the diabetes:    Diagnosed with diabetes age 24; diagnosed as JULIANN in 2020  Started insulin during first pregnancy and able to come off. In 1992 had son and has been on insulin since that time  Nephew is type 1  Family History of Type 2 DM: mother and father  Known complications:  DKA/HHS: twice in the past  RN: +; needs eye exam   PN: +  Nephropathy: -; due in Aug 2023  Gastroparesis: denies  CAD: denies    We prescribed INPEN but had issues with cartridges and expiration of INPEN so we decided to hold off at this time.     Current regimen:  Tresiba 16 units daily   Mounjaro 5 mg weekly  Metformin 1000 mg twice daily   Novolog  - reports she has been able to skip novolog dose occ  at breakfast or dinner. She is taking novolog at least 2 times daily.                                  Breakfast:          2-4 Units              Lunch:               2-5 Units              Dinner:               2-5 Units    States she changes her novolog based on what she is eating at times; sometimes reduces    Has been taking novolog 10-15 minutes prior to a meal most days. Takes right before a meal if her BG is <110.  States she is afraid to give insulin at times before a meal because she is afraid she will drop- takes MCC through meal if her BG <100 (states this occurs 3 times a month). She is changing needle every time and rotating injection sites.     Missed doses-denies     Other medications tried:  Trulicity 1.5 mg weekly- very low appetite so stopped  levemir    4 times daily  See media tab.   She loves the dexcom.       Knows how to correct with 15 grams of carbs- juice, coke, or a peppermint.     Dietary recall: She feels that she is following diabetic diet  Eats 3 meals a day. States that she has noticed portion sizes decreased because she is not as hungry.  B: greek yogurt with splenda or protein shake -premier (7-7:30)  L: healthy choice or grilled cheese sandwich (12-1)  D: same as lunch (6:30-7)   She does not cook often but has some frozen foods.   She has been working from home.   Snacks: not really snacking as much anymore; pickles and cashews; carrots; not normally snacking after dinner  Drinks: water, green tea, and diet cokiet  Had box of raisins before     Exercise -She stopped PT but will resume again.    Education - last visit: 2/2023    Has a Medic alert tag-has bracelet   Glucagon/Baqsimi-declines; lives alone    Diabetes Management Status  Statin: Not taking  ACE/ARB: Not taking -stopped due to diarrhea    Lab Results   Component Value Date    HGBA1C 6.4 (H) 03/13/2024    HGBA1C 6.4 (H) 10/06/2023    HGBA1C 6.4 (H) 05/30/2023     Screening or Prevention Patient's value Goal  "Complete/Controlled?   HgA1C Testing and Control   Lab Results   Component Value Date    HGBA1C 6.4 (H) 03/13/2024      Annually/Less than 8% Yes   Lipid profile : 10/06/2023 Annually Yes   LDL control Lab Results   Component Value Date    LDLCALC 61.4 (L) 10/06/2023    Annually/Less than 100 mg/dl  Yes   Nephropathy screening Lab Results   Component Value Date    LABMICR 17.0 10/06/2023     No results found for: "PROTEINUA" Annually No   Blood pressure BP Readings from Last 1 Encounters:   03/20/24 136/78    Less than 140/90 Yes   Dilated retinal exam : 06/30/2023 Annually Yes   Foot exam   : 01/30/2023 Annually Yes     With regards to thyroid nodule,     Remembers being told in the past that she had a thyroid nodule and needed biopsy which she did not do.     Denies hoarseness or dysphagia.     No radiation to head or neck. No history of thyroid cancer in family.     Thyroid US 4/5/23  Impression:     1.  Thyroid gland is normal in size with homogenous echotexture.     2.  1 nodule in the right thyroid described above.  It does not meet criteria for fine-needle aspiration.     RECOMMENDATIONS:  Follow-up ultrasound in 2 years is recommended.  RECOMMENDATIONS:  Repeat thyroid ultrasound in 2 years.    Lab Results   Component Value Date    TSH 1.360 03/13/2024     With regards to dyslipidemia,     Was prescribed pravastatin 10 mg daily but stopped due to diarrhea then resumed and taking a couple of days a week.   Latest Reference Range & Units 10/06/23 08:33   Cholesterol Total 120 - 199 mg/dL 140   HDL 40 - 75 mg/dL 70   HDL/Cholesterol Ratio 20.0 - 50.0 % 50.0   LDL Cholesterol External 63.0 - 159.0 mg/dL 61.4 (L)   Non-HDL Cholesterol mg/dL 70   Total Cholesterol/HDL Ratio 2.0 - 5.0  2.0   Triglycerides 30 - 150 mg/dL 43   (L): Data is abnormally low    Of note, she had elevated CO2 --declines sleep study at this time.    Review of Systems   Constitutional:  Negative for fatigue and unexpected weight change. " "  Eyes:  Negative for visual disturbance.   Endocrine: Negative for polydipsia, polyphagia and polyuria.   Neurological:  Positive for numbness (left leg).     Objective:   Physical Exam  Constitutional:       Appearance: Normal appearance.   Pulmonary:      Effort: Pulmonary effort is normal.   Neurological:      Mental Status: She is alert.     Denies injection site edema or erythema. No lipo hypertropthy or atrophy  Diabetes Foot Exam:  Feet no cuts or scratches  Shoes appropriate  DM foot exam deferred; done in Jan 2023    Visit Vitals  /78   Pulse 93   Ht 4' 11.5" (1.511 m)   Wt 63.8 kg (140 lb 10.5 oz)   SpO2 99%   BMI 27.93 kg/m²       Wt Readings from Last 3 Encounters:   03/20/24 1534 63.8 kg (140 lb 10.5 oz)   01/09/24 0823 67.2 kg (148 lb 2.4 oz)   12/05/23 0820 68.2 kg (150 lb 3.9 oz)     Lab Review:   Lab Results   Component Value Date    HGBA1C 6.4 (H) 03/13/2024    HGBA1C 6.4 (H) 10/06/2023    HGBA1C 6.4 (H) 05/30/2023      Lab Results   Component Value Date    CHOL 140 10/06/2023    HDL 70 10/06/2023    LDLCALC 61.4 (L) 10/06/2023    TRIG 43 10/06/2023    CHOLHDL 50.0 10/06/2023     Lab Results   Component Value Date     03/13/2024    K 4.2 03/13/2024     03/13/2024    CO2 29 03/13/2024     03/13/2024    BUN 6 (L) 03/13/2024    CREATININE 0.49 (L) 03/13/2024    CALCIUM 9.1 03/13/2024    PROT 6.7 03/13/2024    ALBUMIN 4.1 03/13/2024    BILITOT 0.5 03/13/2024    ALKPHOS 59 03/13/2024    AST 24 03/13/2024    ALT 20 03/13/2024    ANIONGAP 11 03/13/2024    ESTGFRAFRICA >60.0 03/28/2022    EGFRNONAA >60.0 03/28/2022    TSH 1.360 03/13/2024     No results found for: "[id number]"  Assessment and Plan     1. Type 2 diabetes mellitus with both eyes affected by moderate nonproliferative retinopathy without macular edema, with long-term current use of insulin  Hemoglobin A1C    Lipid Panel    Microalbumin/Creatinine Ratio, Urine    Comprehensive Metabolic Panel    tirzepatide " "(MOUNJARO) 5 mg/0.5 mL PnIj    DISCONTINUED: tirzepatide (MOUNJARO) 5 mg/0.5 mL PnIj      2. Thyroid nodule        3. History of obesity        4. 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 Type 1 diabetes mellitus without complication  metFORMIN (GLUCOPHAGE-XR) 500 MG ER 24hr tablet    pen needle, diabetic (BD ULTRA-FINE SHORT PEN NEEDLE) 31 gauge x 5/16" Ndle    DISCONTINUED: pen needle, diabetic (BD ULTRA-FINE SHORT PEN NEEDLE) 31 gauge x 5/16" Ndle      6. Dyslipidemia  pravastatin (PRAVACHOL) 10 MG tablet      7. Class 2 severe obesity with serious comorbidity and body mass index (BMI) of 35.0 to 35.9 in adult, unspecified obesity type  tirzepatide (MOUNJARO) 5 mg/0.5 mL PnIj    DISCONTINUED: tirzepatide (MOUNJARO) 5 mg/0.5 mL PnIj          Type 2 diabetes mellitus with both eyes affected by moderate nonproliferative retinopathy without macular edema, with long-term current use of insulin  -- Reviewed goals of therapy are to get the best control we can without hypoglycemia  -- Treat as type 1 diabetic.   A1c at goal     A1c at goal   Dexcom reviewed : blood sugars are at goal  Continue  Metformin 1000 mg twice daily   Continue Mounjaro 5 mg weekly    Decrease Tresiba to 7 units in AM and 6 units in PM -12 hours apart    Continue  Novolog                Breakfast:          2-4 Units              Lunch:               2-5 Units              Dinner:              2-5 Units     -- Please try to give insulin at least 10 minutes before a meal.   -- Reviewed patient's current insulin regimen. Clarified proper insulin dose and timing in relation to meals, etc. Insulin injection sites and proper rotation instructed.    -- Advised frequent self blood glucose monitoring.  Patient " encouraged to document glucose results and bring them to every clinic visit    -- Hypoglycemia precautions discussed. Instructed on precautions before driving.    -- Call for Bg repeatedly < 90 or > 180.   -- Close adherence to lifestyle changes recommended.   -- Periodic follow ups for eye evaluations, foot care and dental care suggested.  -- Given information on diabetic snacks and hypoglycemia previously  Cholesterol at goal without medications    Thyroid nodule  -- Small stable nodule on US in 2022  -- Denies compressive symptoms  -- Repeat thyroid US in 2025    History of obesity  Continue Mounjaro as above  Continue weight loss  Body mass index is 27.93 kg/m².  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  Labs prior to visit                      Alert and oriented to person, place and time

## 2024-03-20 ENCOUNTER — OFFICE VISIT (OUTPATIENT)
Dept: ENDOCRINOLOGY | Facility: CLINIC | Age: 64
End: 2024-03-20
Payer: COMMERCIAL

## 2024-03-20 VITALS
HEART RATE: 93 BPM | WEIGHT: 140.63 LBS | SYSTOLIC BLOOD PRESSURE: 136 MMHG | OXYGEN SATURATION: 99 % | HEIGHT: 60 IN | BODY MASS INDEX: 27.61 KG/M2 | DIASTOLIC BLOOD PRESSURE: 78 MMHG

## 2024-03-20 DIAGNOSIS — E78.5 DYSLIPIDEMIA: ICD-10-CM

## 2024-03-20 DIAGNOSIS — E04.1 THYROID NODULE: ICD-10-CM

## 2024-03-20 DIAGNOSIS — E66.01 CLASS 2 SEVERE OBESITY WITH SERIOUS COMORBIDITY AND BODY MASS INDEX (BMI) OF 35.0 TO 35.9 IN ADULT, UNSPECIFIED OBESITY TYPE: ICD-10-CM

## 2024-03-20 DIAGNOSIS — Z86.39 HISTORY OF OBESITY: ICD-10-CM

## 2024-03-20 DIAGNOSIS — E10.3393 TYPE 1 DIABETES MELLITUS WITH MODERATE NONPROLIFERATIVE RETINOPATHY OF BOTH EYES WITHOUT MACULAR EDEMA: ICD-10-CM

## 2024-03-20 DIAGNOSIS — E10.9 TYPE 1 DIABETES MELLITUS WITHOUT COMPLICATION: ICD-10-CM

## 2024-03-20 DIAGNOSIS — E11.3393 TYPE 2 DIABETES MELLITUS WITH BOTH EYES AFFECTED BY MODERATE NONPROLIFERATIVE RETINOPATHY WITHOUT MACULAR EDEMA, WITH LONG-TERM CURRENT USE OF INSULIN: Primary | ICD-10-CM

## 2024-03-20 DIAGNOSIS — Z79.4 TYPE 2 DIABETES MELLITUS WITH BOTH EYES AFFECTED BY MODERATE NONPROLIFERATIVE RETINOPATHY WITHOUT MACULAR EDEMA, WITH LONG-TERM CURRENT USE OF INSULIN: Primary | ICD-10-CM

## 2024-03-20 PROCEDURE — 3044F HG A1C LEVEL LT 7.0%: CPT | Mod: CPTII,S$GLB,, | Performed by: NURSE PRACTITIONER

## 2024-03-20 PROCEDURE — 95251 CONT GLUC MNTR ANALYSIS I&R: CPT | Mod: S$GLB,,, | Performed by: NURSE PRACTITIONER

## 2024-03-20 PROCEDURE — 3008F BODY MASS INDEX DOCD: CPT | Mod: CPTII,S$GLB,, | Performed by: NURSE PRACTITIONER

## 2024-03-20 PROCEDURE — 1160F RVW MEDS BY RX/DR IN RCRD: CPT | Mod: CPTII,S$GLB,, | Performed by: NURSE PRACTITIONER

## 2024-03-20 PROCEDURE — 99999 PR PBB SHADOW E&M-EST. PATIENT-LVL IV: CPT | Mod: PBBFAC,,, | Performed by: NURSE PRACTITIONER

## 2024-03-20 PROCEDURE — 3078F DIAST BP <80 MM HG: CPT | Mod: CPTII,S$GLB,, | Performed by: NURSE PRACTITIONER

## 2024-03-20 PROCEDURE — 1159F MED LIST DOCD IN RCRD: CPT | Mod: CPTII,S$GLB,, | Performed by: NURSE PRACTITIONER

## 2024-03-20 PROCEDURE — 99214 OFFICE O/P EST MOD 30 MIN: CPT | Mod: 25,S$GLB,, | Performed by: NURSE PRACTITIONER

## 2024-03-20 PROCEDURE — 3075F SYST BP GE 130 - 139MM HG: CPT | Mod: CPTII,S$GLB,, | Performed by: NURSE PRACTITIONER

## 2024-03-20 RX ORDER — BLOOD-GLUCOSE SENSOR
EACH MISCELLANEOUS
Qty: 3 EACH | Refills: 3 | Status: SHIPPED | OUTPATIENT
Start: 2024-03-20 | End: 2024-03-20

## 2024-03-20 RX ORDER — TIRZEPATIDE 5 MG/.5ML
5 INJECTION, SOLUTION SUBCUTANEOUS
Qty: 4 PEN | Refills: 6 | Status: SHIPPED | OUTPATIENT
Start: 2024-03-20 | End: 2024-04-09 | Stop reason: SDUPTHER

## 2024-03-20 RX ORDER — METFORMIN HYDROCHLORIDE 500 MG/1
1000 TABLET, EXTENDED RELEASE ORAL 2 TIMES DAILY WITH MEALS
Qty: 360 TABLET | Refills: 3 | Status: SHIPPED | OUTPATIENT
Start: 2024-03-20 | End: 2025-03-20

## 2024-03-20 RX ORDER — INSULIN DEGLUDEC 100 U/ML
INJECTION, SOLUTION SUBCUTANEOUS
Qty: 4 ML | Refills: 11 | Status: SHIPPED | OUTPATIENT
Start: 2024-03-20 | End: 2025-03-20

## 2024-03-20 RX ORDER — PEN NEEDLE, DIABETIC 30 GX3/16"
NEEDLE, DISPOSABLE MISCELLANEOUS
Qty: 200 EACH | Refills: 1 | Status: SHIPPED | OUTPATIENT
Start: 2024-03-20 | End: 2024-03-20

## 2024-03-20 RX ORDER — PEN NEEDLE, DIABETIC 30 GX3/16"
NEEDLE, DISPOSABLE MISCELLANEOUS
Qty: 400 EACH | Refills: 6 | Status: SHIPPED | OUTPATIENT
Start: 2024-03-20

## 2024-03-20 RX ORDER — PRAVASTATIN SODIUM 10 MG/1
10 TABLET ORAL NIGHTLY
Qty: 90 TABLET | Refills: 3 | Status: SHIPPED | OUTPATIENT
Start: 2024-03-20 | End: 2025-03-20

## 2024-03-20 RX ORDER — TIRZEPATIDE 5 MG/.5ML
5 INJECTION, SOLUTION SUBCUTANEOUS
Qty: 4 PEN | Refills: 3 | Status: SHIPPED | OUTPATIENT
Start: 2024-03-20 | End: 2024-03-20

## 2024-03-20 RX ORDER — BLOOD-GLUCOSE SENSOR
EACH MISCELLANEOUS
Qty: 3 EACH | Refills: 6 | Status: SHIPPED | OUTPATIENT
Start: 2024-03-20 | End: 2024-06-14

## 2024-03-20 NOTE — ASSESSMENT & PLAN NOTE
Continue Mounjaro as above  Continue weight loss  Body mass index is 27.93 kg/m².  No longer obese

## 2024-03-20 NOTE — ASSESSMENT & PLAN NOTE
-- Small stable nodule on US in 2022  -- Denies compressive symptoms  -- Repeat thyroid US in 2025

## 2024-03-20 NOTE — PATIENT INSTRUCTIONS
fax to fax  number of eye exam     Thyroid Nodule               Check thyroid US in 4/2025     Cholesterol              Continue pravastatin 10 mg a couple of days a week              LDL at goal     Diabetes  A1c at goal   Dexcom reviewed : blood sugars are at goal  Continue  Metformin 1000 mg twice daily   Continue Mounjaro 5 mg weekly    Decrease Tresiba to 7 units in AM and 6 units in PM -12 hours apart    Continue  Novolog                Breakfast:          2-4 Units              Lunch:               2-5 Units              Dinner:              2-5 Units      Coronary Artery Calcium Score test to predict risk of heart attack in the next ten years

## 2024-03-20 NOTE — ASSESSMENT & PLAN NOTE
-- Reviewed goals of therapy are to get the best control we can without hypoglycemia  -- Treat as type 1 diabetic.   A1c at goal     A1c at goal   Dexcom reviewed : blood sugars are at goal  Continue  Metformin 1000 mg twice daily   Continue Mounjaro 5 mg weekly    Decrease Tresiba to 7 units in AM and 6 units in PM -12 hours apart    Continue  Novolog                Breakfast:          2-4 Units              Lunch:               2-5 Units              Dinner:              2-5 Units     -- Please try to give insulin at least 10 minutes before a meal.   -- Reviewed patient's current insulin regimen. Clarified proper insulin dose and timing in relation to meals, etc. Insulin injection sites and proper rotation instructed.    -- Advised frequent self blood glucose monitoring.  Patient encouraged to document glucose results and bring them to every clinic visit    -- Hypoglycemia precautions discussed. Instructed on precautions before driving.    -- Call for Bg repeatedly < 90 or > 180.   -- Close adherence to lifestyle changes recommended.   -- Periodic follow ups for eye evaluations, foot care and dental care suggested.  -- Given information on diabetic snacks and hypoglycemia previously  Cholesterol at goal without medications

## 2024-03-29 ENCOUNTER — PATIENT MESSAGE (OUTPATIENT)
Dept: PODIATRY | Facility: CLINIC | Age: 64
End: 2024-03-29
Payer: COMMERCIAL

## 2024-04-09 ENCOUNTER — PATIENT MESSAGE (OUTPATIENT)
Dept: ENDOCRINOLOGY | Facility: CLINIC | Age: 64
End: 2024-04-09
Payer: COMMERCIAL

## 2024-04-09 DIAGNOSIS — E11.3393 TYPE 2 DIABETES MELLITUS WITH BOTH EYES AFFECTED BY MODERATE NONPROLIFERATIVE RETINOPATHY WITHOUT MACULAR EDEMA, WITH LONG-TERM CURRENT USE OF INSULIN: ICD-10-CM

## 2024-04-09 DIAGNOSIS — E66.01 CLASS 2 SEVERE OBESITY WITH SERIOUS COMORBIDITY AND BODY MASS INDEX (BMI) OF 35.0 TO 35.9 IN ADULT, UNSPECIFIED OBESITY TYPE: ICD-10-CM

## 2024-04-09 DIAGNOSIS — Z79.4 TYPE 2 DIABETES MELLITUS WITH BOTH EYES AFFECTED BY MODERATE NONPROLIFERATIVE RETINOPATHY WITHOUT MACULAR EDEMA, WITH LONG-TERM CURRENT USE OF INSULIN: ICD-10-CM

## 2024-04-09 RX ORDER — TIRZEPATIDE 5 MG/.5ML
5 INJECTION, SOLUTION SUBCUTANEOUS
Qty: 4 PEN | Refills: 6 | Status: SHIPPED | OUTPATIENT
Start: 2024-04-09

## 2024-04-18 ENCOUNTER — PATIENT MESSAGE (OUTPATIENT)
Dept: ENDOCRINOLOGY | Facility: CLINIC | Age: 64
End: 2024-04-18
Payer: COMMERCIAL

## 2024-04-23 ENCOUNTER — OFFICE VISIT (OUTPATIENT)
Dept: PODIATRY | Facility: CLINIC | Age: 64
End: 2024-04-23
Payer: COMMERCIAL

## 2024-04-23 VITALS — WEIGHT: 137.88 LBS | RESPIRATION RATE: 18 BRPM | HEIGHT: 60 IN | BODY MASS INDEX: 27.07 KG/M2

## 2024-04-23 DIAGNOSIS — R93.7 BONE MARROW EDEMA: ICD-10-CM

## 2024-04-23 DIAGNOSIS — M76.72 PERONEAL TENDINITIS OF LEFT LOWER EXTREMITY: Primary | ICD-10-CM

## 2024-04-23 PROCEDURE — 3008F BODY MASS INDEX DOCD: CPT | Mod: CPTII,S$GLB,, | Performed by: PODIATRIST

## 2024-04-23 PROCEDURE — 99213 OFFICE O/P EST LOW 20 MIN: CPT | Mod: S$GLB,,, | Performed by: PODIATRIST

## 2024-04-23 PROCEDURE — 99999 PR PBB SHADOW E&M-EST. PATIENT-LVL V: CPT | Mod: PBBFAC,,, | Performed by: PODIATRIST

## 2024-04-23 PROCEDURE — 3044F HG A1C LEVEL LT 7.0%: CPT | Mod: CPTII,S$GLB,, | Performed by: PODIATRIST

## 2024-04-23 PROCEDURE — 1159F MED LIST DOCD IN RCRD: CPT | Mod: CPTII,S$GLB,, | Performed by: PODIATRIST

## 2024-04-23 PROCEDURE — 1160F RVW MEDS BY RX/DR IN RCRD: CPT | Mod: CPTII,S$GLB,, | Performed by: PODIATRIST

## 2024-04-23 NOTE — PATIENT INSTRUCTIONS
Peroneal Tendon Injuries  What Are the Peroneal Tendons?         A tendon is a band of tissue that connects a muscle to a bone. The two peroneal tendons in the foot run side by side behind the outer ankle bone. One peroneal tendon attaches to the outer part of the midfoot, while the other tendon runs under the foot and attaches near the inside of the arch. The main function of the peroneal tendons is to stabilize the foot and ankle and protect them from sprains.    Causes & Symptoms of Peroneal Tendon Injuries  Peroneal tendon injuries may be acute (occurring suddenly) or chronic (developing over a period of time). They most commonly occur in individuals who participate in sports that involve repetitive ankle motion. In addition, people with higher arches are at risk for developing peroneal tendon injuries. Basic types of peroneal tendon injuries are tendonitis, tears and subluxation.    Tendonitis is an inflammation of one or both tendons. The inflammation is caused by activities involving repetitive use of the tendon, overuse of the tendon or trauma (such as an ankle sprain). Symptoms of tendonitis include:    Pain  Swelling  Warm to the touch     Acute tears are caused by repetitive activity or trauma. Immediate symptoms of acute tears include:    Pain  Swelling  Weakness or instability of the foot and ankle     As time goes on, these tears may lead to a change in the shape of the foot in which the arch may become higher.    Degenerative tears (tendonosis) are usually due to overuse and occur over long periods of time, often years. In degenerative tears, the tendon is like taffy that has been overstretched until it becomes thin and eventually frays. Having high arches also puts you at risk for developing a degenerative tear. The symptoms of degenerative tears may include:    Sporadic pain (occurring from time to time) on the outside of the ankle  Weakness or instability in the ankle  An increase in the height of  the arch     Subluxation means one or both tendons have slipped out of their normal position. In some cases, subluxation is due to a condition in which a person is born with a variation in the shape of the bone or muscle. In other cases, subluxation occurs following trauma, such as an ankle sprain. Damage or injury to the tissues that stabilize the tendons (retinaculum) can lead to chronic tendon subluxation. The symptoms of subluxation may include:    A snapping feeling of the tendon around the ankle bone  Sporadic pain behind the outside ankle bone  Ankle instability or weakness     Early treatment of a subluxation is critical since a tendon that continues to sublux (move out of position) is more likely to tear or rupture. Therefore, if you feel the characteristic snapping, see a foot and ankle surgeon immediately.    Diagnosis  Because peroneal tendon injuries are sometimes misdiagnosed and may worsen without proper treatment, prompt evaluation by a foot and ankle surgeon is advised. To diagnose a peroneal tendon injury, the surgeon will examine the foot and look for pain, instability, swelling, warmth and weakness on the outer side of the ankle. In addition, an x-ray or other advanced imaging studies may be needed to fully evaluate the injury. The foot and ankle surgeon will also look for signs of an ankle sprain and other related injuries that sometimes accompany a peroneal tendon injury. Proper diagnosis is important because prolonged discomfort after a simple sprain may be a sign of additional problems.    Nonsurgical Treatment  Treatment depends on the type of peroneal tendon injury. Options include:    Immobilization. A cast or splint may be used to keep the foot and ankle from moving and allow the injury to heal.  Medications. Oral or injected anti-inflammatory drugs may help relieve pain and inflammation.  Physical therapy. Ice, heat or ultrasound therapy may be used to reduce swelling and pain. As  symptoms improve, exercises can be added to strengthen the muscles and improve range of motion and balance.  Bracing. The surgeon may provide a brace to use for a short while or during activities requiring repetitive ankle motion. Bracing may also be an option when a patient is not a candidate for surgery.     When Is Surgery Needed?  In some cases, surgery may be needed to repair the tendon or tendons and perhaps the supporting structures of the foot. The foot and ankle surgeon will determine the most appropriate procedure for the patients condition and lifestyle. After surgery, physical therapy is an important part of rehabilitation.      What is Tendonitis of the Foot?  When you use a set of muscles too much, youre likely to strain the tendons (soft tissues) that connect those muscles to your bones. At first, pain or swelling may come and go quickly. But if you do too much too soon, your muscles may overtire again. The strain may cause a tendons outer covering to swell or small fibers in a tendon to pull apart. If you keep pushing your muscles, damage to the tendons adds up, and tendonitis develops. Over time, pain and swelling may limit your activities. But with your doctors help, tendonitis can be controlled. Both your symptoms and your risk of future problems including tendon rupture can be reduced.       The back of your foot  The Achilles tendon connects the calf muscle to the heel bone. If tendonitis occurs here, you may feel pain when your foot touches down or when your heel lifts off the ground.   The front of your foot  The anterior tibial tendon helps control the front of your foot when it meets the ground. If this tendon is strained, you may feel pain when you go down stairs or walk or run on hills.     The inside of your foot  The posterior tibial tendon runs along the inside of the ankle and foot. If this tendon is strained, your foot may hurt when it moves forward to push off the ground. Or you  may feel pain when your heel shifts from side to side.   The outside of your foot  The peroneal tendon wraps across the bottom of your foot, from the outside to the inside. Tendonitis here may cause pain when you stand or push off the ground and when walking on uneven surfaces.   Date Last Reviewed: 9/21/2015  © 5320-2774 [employer]. [address] [place] [address]. All rights reserved. This information is not intended as a substitute for professional medical care. Always follow your healthcare professional's instructions.        Treating Tendonitis of the Foot  Your healthcare provider's first concern is to reduce your symptoms. Using ice and heat, taking medicines, and limiting activity help control pain and swelling. Follow all of your healthcare provider's instructions. Returning to activity too soon may cause your symptoms to come back.    Ice and heat  Ice helps prevent swelling and reduce pain. Place ice on the painful area for 10 to 15 minutes. Repeat the icing several times a day. If ?you have had the problem for a while, using heat may help. Apply a heating pad or hot towels to the tendon for 20 to 30 minutes 2 or 3 times a day.  Medicines  Your healthcare provider may tell you to take ibuprofen or other anti-inflammatory medicines. These reduce pain and swelling. Take them as directed. Dont wait until you feel pain. In more severe cases, cortisone may be injected to relieve pain.  Limiting activities  Rest allows the tissues in your foot to heal. Stay off your feet for a few days, then slowly work back into activity. If you do high-impact activities, such as running or aerobics, try other activities that place less strain on your foot. Cycling and swimming are good choices.  Date Last Reviewed: 9/21/2015  © 1594-8425 [employer]. [address] [place] [id number]. All rights reserved. This information is not intended as a substitute for professional medical  care. Always follow your healthcare professional's instructions.

## 2024-04-23 NOTE — PROGRESS NOTES
[facility]  [address]  [address]  [facility] [place] [address]  Dept: [phone]  Dept Fax: [phone]    Rodney Orozco Jr., DPM     Assessment:   MDM    Coding  1. Peroneal tendinitis of left lower extremity  EKG 12-lead    X-Ray Chest PA And Lateral    Hemoglobin A1C    Comprehensive Metabolic Panel    Prealbumin    CBC Auto Differential      2. Bone marrow edema              Plan:     Procedures  1. Peroneal tendinitis of left lower extremity  -     EKG 12-lead; Future  -     X-Ray Chest PA And Lateral; Future; Expected date: 04/23/2024  -     Hemoglobin A1C; Future; Expected date: 04/23/2024  -     Comprehensive Metabolic Panel; Future; Expected date: 04/23/2024  -     Prealbumin; Future; Expected date: 04/23/2024  -     CBC Auto Differential; Future; Expected date: 04/23/2024    2. Bone marrow edema        Kalpana was seen today for foot pain.    Diagnoses and all orders for this visit:    Peroneal tendinitis of left lower extremity  -     EKG 12-lead; Future  -     X-Ray Chest PA And Lateral; Future  -     Hemoglobin A1C; Future  -     Comprehensive Metabolic Panel; Future  -     Prealbumin; Future  -     CBC Auto Differential; Future    Bone marrow edema          -pt seen, evaluated, and managed  -dx discussed in detail. All questions/concerns addressed  -all tx options discussed. All alternatives, risks, benefits of all txs discussed  -Pt failed conservative care options possible including but not limited to shoe wear and/or padding, bracing/strapping, at home ROM, formal PT, medical therapy, injection therapy  -XR/imaging reviewed by me: agree with read  -labs reviewed by me: ok for vgel  -implemented icing/stretching regimen  - ASO  dispensed - use prn  -MRI reviewed  -given failure of conservative measures, we discussed surgical intervention  -pt would benefit from operative intervention: we discussed the following procedures: L PB allograft, PL tenolysis, BMAC, possible  SCP calcaneus  -we discussed approx postop course  -would be out pt, elective surgery  -would GA + block, supine position  -would need PCP clearance before proceeding  -labs and xr on way out  -potential DOS: pt will call      -rxs dispensed: none  -referrals: none  -WB: wbat       Follow up if symptoms worsen or fail to improve.    Subjective:      Patient ID: Kalpana Rivera is a 63 y.o. female.    Chief Complaint:   Chief Complaint   Patient presents with    Foot Pain     Left        CC - foot pain: patient presents to the podiatry clinic  with complaint of  left foot pain. Onset of the symptoms was several months ago. Precipitating event: unk. Current symptoms include: ability to bear weight, but with some pain, stiffness, swelling and worsening symptoms after a period of activity. Aggravating factors: walking and certain shoegear. Symptoms have gradually worsened. Patient has had no prior foot problems. Evaluation to date: none. Treatment to date: avoidance of offending activity. Patients rates pain 6/10 on pain scale.        24:  Hx as above. F/u for PL/PB tendonitis. Txd conservatively and failed. Has obtained MRI    Ankle Pain     Follow-up    Foot Pain        Last Podiatry Enc: Visit date not found  Last Enc w/ Me: Visit date not found    Outside reports reviewed: historical medical records.  Family hx: as below  Past Medical History:   Diagnosis Date    Diabetes mellitus, type 2      Past Surgical History:   Procedure Laterality Date    CARPAL TUNNEL RELEASE       SECTION      2    left thumb      right middle finger      right shoulder       Family History   Problem Relation Name Age of Onset    Diabetes Mother      Parkinsonism Mother      Alzheimer's disease Mother      Diabetes Father      Cancer Father       Current Outpatient Medications   Medication Sig Dispense Refill    blood-glucose sensor (DEXCOM G7 SENSOR) Brittany To change every 10 days 3 each 6    cetirizine (ZYRTEC) 10 MG tablet  "Take 10 mg by mouth once daily.      fluticasone propionate (FLONASE) 50 mcg/actuation nasal spray 1 spray by Each Nostril route once daily.      insulin aspart U-100 (NOVOLOG FLEXPEN U-100 INSULIN) 100 unit/mL (3 mL) InPn pen INJECT 3 UNITS WITH BREAKFAST, 5 UNITS WITH LUNCH, AND 5 UNITS WITH DINNER, INJECT 1-2 UNITS WITH SNACKS. 45 mL 1    insulin degludec (TRESIBA U-100 INSULIN) 100 unit/mL injection Inject 0.07 mLs (7 Units total) into the skin daily with breakfast AND 0.06 mLs (6 Units total) daily with dinner or evening meal. 4 mL 11    Lactobacillus rhamnosus GG (CULTURELLE) 10 billion cell capsule Take 1 capsule by mouth once daily.      metFORMIN (GLUCOPHAGE-XR) 500 MG ER 24hr tablet Take 2 tablets (1,000 mg total) by mouth 2 (two) times daily with meals. TAKE TWO TABLETS BY MOUTH TWICE A DAY WITH MEALS 360 tablet 3    multivitamin with minerals tablet Take 1 tablet by mouth once daily.      pen needle, diabetic (BD ULTRA-FINE SHORT PEN NEEDLE) 31 gauge x 5/16" Ndle USE TO INJECT INSULIN FOUR TIMES A  each 6    pravastatin (PRAVACHOL) 10 MG tablet Take 1 tablet (10 mg total) by mouth every evening. 90 tablet 3    tirzepatide (MOUNJARO) 5 mg/0.5 mL PnIj Inject 5 mg (one pen) into the skin every 7 days. 4 Pen 6    TURMERIC ORAL Take by mouth.      UNABLE TO FIND Cranberry & Buchu natural supplement      UNABLE TO FIND Nerve Eight natural supplement      UNABLE TO FIND [place] natural supplement      VITAMIN B COMPLEX ORAL Take 1 tablet by mouth.       No current facility-administered medications for this visit.     Review of patient's allergies indicates:   Allergen Reactions    Azithromycin Anaphylaxis     Social History     Socioeconomic History    Marital status:    Tobacco Use    Smoking status: Never    Smokeless tobacco: Never   Substance and Sexual Activity    Alcohol use: Yes    Drug use: Never     Social Determinants of Health     Financial Resource Strain: Unknown (11/21/2022)    " Received from [facility]    Overall Financial Resource Strain (CARDIA)     Difficulty of Paying Living Expenses: Patient declined   Food Insecurity: Unknown (11/21/2022)    Received from [facility]    Hunger Vital Sign     Worried About Running Out of Food in the Last Year: Patient declined     Ran Out of Food in the Last Year: Patient declined   Transportation Needs: Unknown (11/21/2022)    Received from [facility] [employer] [facility]    PRAPARE - Transportation     Lack of Transportation (Medical): Patient declined     Lack of Transportation (Non-Medical): Patient declined   Physical Activity: Inactive (11/21/2022)    Received from [facility]    Exercise Vital Sign     Days of Exercise per Week: 0 days     Minutes of Exercise per Session: 0 min   Stress: No Stress Concern Present (11/21/2022)    Received from [facility]    [employer] [place] [employer] - Occupational Stress Questionnaire     Feeling of Stress : Not at all   Social Connections: Unknown (11/21/2022)    Received from [facility]    Social Connection and Isolation Panel [NHANES]     Frequency of Communication with Friends and Family: Patient declined     Frequency of Social Gatherings with Friends and Family: Patient declined     Attends Anabaptist Services: Patient declined     Active Member of Clubs or Organizations: Patient declined     Attends Club or Organization Meetings: Patient declined     Marital Status: Patient declined   Housing Stability: Unknown (11/21/2022)    Received from [employer] [facility] to Share    Housing Stability Vital Sign     Unable to Pay for Housing in the Last Year: Patient refused     In the last 12 months, was there a time when you did not have a steady place to sleep or slept in a shelter (including now)?: Patient refused       ROS    REVIEW OF SYSTEMS: Negative as documented below as well as positive findings in bold.       Constitutional   "Respiratory  Gastrointestinal  Skin   - Fever - Cough - Heartburn - Rash   - Chills - Spit blood - Nausea - Itching   - Weight Loss - Shortness of breath - Vomiting - Nail pain   - Malaise/Fatigue - Wheezing - Abdominal Pain  Wound/Ulcer   - Weight Gain   - Blood in Stool  Poor wound healing       - Diarrhea          Cardiovascular  Genitourinary  Neurological  HEENT   - Chest Pain - Dysuria - Burning Sensation of feet - Headache   - Palpitations - Hematuria - Tingling / Paresthesia - Congestion   - Pain at night in legs - Flank Pain - Dizziness - Sore Throat   - Cramping   - Tremor - Blurred Vision   - Leg Swelling   - Sensory Change - Double Vision   - Dizzy when standing   - Speech Change - Eye Redness       - Focal Weakness - Dry Eyes       - Loss of Consciousness          Endocrine  Musculoskeletal  Psychiatric   - Cold intolerance - Muscle Pain - Depression   - Heat intolerance - Neck Pain - Insomnia   - Anemia - Joint Pain - Memory Loss   -  Easy bruising, bleeding - Heel pain - Anxiety      Toe Pain        Leg/Ankle/Foot Pain         Objective:     Resp 18   Ht 4' 11.5" (1.511 m)   Wt 62.5 kg (137 lb 14.4 oz)   BMI 27.39 kg/m²   Vitals:    04/23/24 0818   Resp: 18   Weight: 62.5 kg (137 lb 14.4 oz)   Height: 4' 11.5" (1.511 m)   PainSc:   2   PainLoc: Foot           Physical Exam    General Appearance:   Patient appears well developed, well nourished  Patient appears stated age    Psychiatric:   Patient is oriented to time, place, and person.  Patient has appropriate mood and affect    Neck:  Trachea Midline  No visible masses    Respiratory/Ears:  No distress or labored breathing.  Able to differentiate between normal talking voice and whisper.  Able to follow commands    Eyes:  Visual Acuity intact  Lids and conjunctivae normal. No discoloration noted.    Foot Exam  Physical Exam  Ortho Exam  Ortho/SPM Exam  Physical Exam  Foot/Ankle Musculoskeletal Exam    L LE exam con't:  V:  DP 2/4, PT 2/4   CRT< 3s " to all digits tested   Tibial and popliteal lymph nodes are w/o abnormality   Edema: absent, varicosities: present    N:  Patient displays normal ankle reflexes   SILT in SP/DP/T/Cody/Saph distributions    Ortho: +Motor EHL/FHL/TA/GA   No major/gross deformity present   There is no decreased ROM at the ankle or ST joints: pain is not present, crepitus is not present  There is moderate pain with palpation of PL/PB in retromal area  +TTP CFL - no instability present  Compartments soft/compressible. No pain on passive stretch of big toe. No calf  Pain.    Derm:  skin intact, skin warm and dry, skin without ulcers or lesions, skin without induration, nails normal, no erythema and no ecchymosis    Imaging / Labs:      MRI Foot (Hindfoot) Left Without Contrast    Result Date: 4/15/2024  EXAMINATION: MRI FOOT (HINDFOOT) LEFT WITHOUT CONTRAST CLINICAL HISTORY: Foot trauma, tendon injury or dislocation suspected, xray equivocal (Age >= 6y);Foot pain, chronic, osteoarthritis suspected;  Peroneal tendinitis, left leg TECHNIQUE: Multiplanar, multi sequence MRI of the left ankle/hindfoot performed without contrast. COMPARISON: Radiographs 10/05/2023 FINDINGS: Ligaments: Anterior and posterior inferior tibiofibular ligaments are intact.  Anterior talofibular, calcaneofibular and posterior talofibular ligaments are intact.  Deep and superficial components of deltoid ligament are intact.  Spring ligament complex and Lisfranc ligament are intact. Tendons: There is severe tendinosis and interstitial tear of the peroneus brevis tendon.  There is mild peroneus longus tendinosis.  There is a small volume of fluid within the peroneus tendon sheaths.  Medial ankle flexor and dorsal ankle extensor tendons are unremarkable.  Achilles tendon is intact. Joints: Chondral fissuring with subchondral bone marrow edema seen at the posterior tibial plafond.  Talar dome cartilage is maintained. Bones:  No fracture or infiltrative process.  Reactive  marrow edema noted at the lateral calcaneus. Miscellaneous: Plantar fascia, sinus tarsi, and tarsal tunnel are unremarkable.  Nonspecific Kager's fat pad edema noted.     1. Severe tendinosis and interstitial tear peroneus brevis tendon.  Mild peroneus longus tendinosis.  Small volume of fluid within the peroneus tendon sheaths.  Reactive marrow edema of the lateral calcaneus. 2. Tibial plafond chondral fissuring with subchondral bone marrow edema. Electronically signed by: Nasim Daley MD Date:    04/15/2024 Time:    10:27         Note: This was dictated using a computer transcription program. Although proofread, it may contain computer transcription errors and phonetic errors. Other human proofreading errors may also exist. Corrections may be performed at a later time. Please contact us for any clarification if needed.    Rodney Orozco DPM  [facility]

## 2024-04-29 ENCOUNTER — TELEPHONE (OUTPATIENT)
Dept: FAMILY MEDICINE | Facility: CLINIC | Age: 64
End: 2024-04-29
Payer: COMMERCIAL

## 2024-04-29 DIAGNOSIS — Z12.31 ENCOUNTER FOR SCREENING MAMMOGRAM FOR MALIGNANT NEOPLASM OF BREAST: Primary | ICD-10-CM

## 2024-04-29 NOTE — TELEPHONE ENCOUNTER
"Per PAR in Outpatient:     "Good morning MRN [id number] is scheduled for a mammogram on Friday but her order is . Could you please get a new order for her? Thank you"      Mammo ordered, per [facility], due to order being .   "

## 2024-05-03 ENCOUNTER — PATIENT MESSAGE (OUTPATIENT)
Dept: PODIATRY | Facility: CLINIC | Age: 64
End: 2024-05-03
Payer: COMMERCIAL

## 2024-05-03 ENCOUNTER — PATIENT MESSAGE (OUTPATIENT)
Dept: ENDOCRINOLOGY | Facility: CLINIC | Age: 64
End: 2024-05-03
Payer: COMMERCIAL

## 2024-05-07 DIAGNOSIS — E11.3393 TYPE 2 DIABETES MELLITUS WITH BOTH EYES AFFECTED BY MODERATE NONPROLIFERATIVE RETINOPATHY WITHOUT MACULAR EDEMA, WITH LONG-TERM CURRENT USE OF INSULIN: Primary | ICD-10-CM

## 2024-05-07 DIAGNOSIS — Z79.4 TYPE 2 DIABETES MELLITUS WITH BOTH EYES AFFECTED BY MODERATE NONPROLIFERATIVE RETINOPATHY WITHOUT MACULAR EDEMA, WITH LONG-TERM CURRENT USE OF INSULIN: Primary | ICD-10-CM

## 2024-05-07 RX ORDER — TIRZEPATIDE 2.5 MG/.5ML
2.5 INJECTION, SOLUTION SUBCUTANEOUS
Qty: 4 PEN | Refills: 3 | Status: SHIPPED | OUTPATIENT
Start: 2024-05-07

## 2024-06-13 DIAGNOSIS — E10.3393 TYPE 1 DIABETES MELLITUS WITH MODERATE NONPROLIFERATIVE RETINOPATHY OF BOTH EYES WITHOUT MACULAR EDEMA: ICD-10-CM

## 2024-06-14 RX ORDER — BLOOD-GLUCOSE SENSOR
EACH MISCELLANEOUS
Qty: 3 EACH | Refills: 11 | Status: SHIPPED | OUTPATIENT
Start: 2024-06-14

## 2024-07-01 ENCOUNTER — PATIENT OUTREACH (OUTPATIENT)
Dept: ADMINISTRATIVE | Facility: HOSPITAL | Age: 64
End: 2024-07-01
Payer: COMMERCIAL

## 2024-07-01 NOTE — PROGRESS NOTES
Population Health Chart Review & Patient Outreach Details      Additional [facility] Health Notes:               Updates Requested / Reviewed:      Updated Care Coordination Note, Care Everywhere, Care Team Updated, and Immunizations Reconciliation Completed or Queried: [facility] Topics Overdue:      [facility] Score: 2     Eye Exam  Foot Exam    Shingles/Zoster Vaccine  RSV Vaccine                  Health Maintenance Topic(s) Outreach Outcomes & Actions Taken:    Eye Exam - Outreach Outcomes & Actions Taken  : External Records Requested & Care Team Updated if Applicable

## 2024-07-01 NOTE — LETTER
AUTHORIZATION FOR RELEASE OF   CONFIDENTIAL INFORMATION    Dr. Silva,    We are seeing Kalpana Rivera, date of birth 1960, in the clinic at [facility]. Omayra Castillo DO is the patient's PCP. Kalpana Rivera has an outstanding lab/procedure at the time we reviewed her chart. In order to help keep her health information updated, she has authorized us to request the following medical record(s):                                         ( xx )  EYE EXAM                 Please fax records to [facility], Champagne, Sarah A., DO, [phone].     If you have any questions, please contact Trisha Albrecht, Care Coordinator  at [phone].             Patient Name: Kalpana Rivera  : 1960  Patient Phone #: [phone]

## 2024-07-02 ENCOUNTER — PATIENT OUTREACH (OUTPATIENT)
Dept: ADMINISTRATIVE | Facility: HOSPITAL | Age: 64
End: 2024-07-02
Payer: COMMERCIAL

## 2024-07-02 NOTE — PROGRESS NOTES
Population Health Chart Review & Patient Outreach Details      Additional [facility] Health Notes:               Updates Requested / Reviewed:      Updated Care Coordination Note, Care Everywhere, and Immunizations Reconciliation Completed or Queried: [place]         Health Maintenance Topics Overdue:      [facility] Score: 2     Eye Exam  Foot Exam    Shingles/Zoster Vaccine  RSV Vaccine                  Health Maintenance Topic(s) Outreach Outcomes & Actions Taken:    Eye Exam - Outreach Outcomes & Actions Taken  : Diabetic Eye External Records Uploaded, Care Team & History Updated if Applicable

## 2024-08-06 ENCOUNTER — PATIENT MESSAGE (OUTPATIENT)
Dept: ENDOCRINOLOGY | Facility: CLINIC | Age: 64
End: 2024-08-06
Payer: COMMERCIAL

## 2024-08-07 DIAGNOSIS — E10.3393 TYPE 1 DIABETES MELLITUS WITH MODERATE NONPROLIFERATIVE RETINOPATHY OF BOTH EYES WITHOUT MACULAR EDEMA: Primary | ICD-10-CM

## 2024-08-09 ENCOUNTER — TELEPHONE (OUTPATIENT)
Dept: OTOLARYNGOLOGY | Facility: CLINIC | Age: 64
End: 2024-08-09
Payer: COMMERCIAL

## 2024-08-09 ENCOUNTER — CLINICAL SUPPORT (OUTPATIENT)
Dept: OTOLARYNGOLOGY | Facility: CLINIC | Age: 64
End: 2024-08-09
Payer: COMMERCIAL

## 2024-08-09 ENCOUNTER — OFFICE VISIT (OUTPATIENT)
Dept: OTOLARYNGOLOGY | Facility: CLINIC | Age: 64
End: 2024-08-09
Payer: COMMERCIAL

## 2024-08-09 VITALS
DIASTOLIC BLOOD PRESSURE: 74 MMHG | HEART RATE: 80 BPM | SYSTOLIC BLOOD PRESSURE: 150 MMHG | BODY MASS INDEX: 25.36 KG/M2 | WEIGHT: 125.56 LBS

## 2024-08-09 DIAGNOSIS — T85.898A OBSTRUCTION OF PRESSURE EQUALIZATION TUBE, INITIAL ENCOUNTER: ICD-10-CM

## 2024-08-09 DIAGNOSIS — J31.0 CHRONIC RHINITIS: ICD-10-CM

## 2024-08-09 DIAGNOSIS — H90.A31 MIXED CONDUCTIVE AND SENSORINEURAL HEARING LOSS OF RIGHT EAR WITH RESTRICTED HEARING OF LEFT EAR: ICD-10-CM

## 2024-08-09 DIAGNOSIS — H69.93 DYSFUNCTION OF BOTH EUSTACHIAN TUBES: Primary | ICD-10-CM

## 2024-08-09 DIAGNOSIS — H90.A12 CONDUCTIVE HEARING LOSS OF LEFT EAR WITH RESTRICTED HEARING OF RIGHT EAR: ICD-10-CM

## 2024-08-09 DIAGNOSIS — H93.19 TINNITUS, UNSPECIFIED LATERALITY: ICD-10-CM

## 2024-08-09 DIAGNOSIS — H69.93 EUSTACHIAN TUBE DYSFUNCTION, BILATERAL: Primary | ICD-10-CM

## 2024-08-09 DIAGNOSIS — H93.8X3 EAR FULLNESS, BILATERAL: ICD-10-CM

## 2024-08-09 DIAGNOSIS — H61.21 IMPACTED CERUMEN OF RIGHT EAR: ICD-10-CM

## 2024-08-09 PROCEDURE — 99999 PR PBB SHADOW E&M-EST. PATIENT-LVL I: CPT | Mod: PBBFAC,,,

## 2024-08-09 PROCEDURE — 99999 PR PBB SHADOW E&M-EST. PATIENT-LVL IV: CPT | Mod: PBBFAC,,, | Performed by: NURSE PRACTITIONER

## 2024-08-09 RX ORDER — OFLOXACIN 3 MG/ML
3 SOLUTION AURICULAR (OTIC) 2 TIMES DAILY
Qty: 5 ML | Refills: 0 | Status: SHIPPED | OUTPATIENT
Start: 2024-08-09 | End: 2024-08-19

## 2024-08-22 ENCOUNTER — TELEPHONE (OUTPATIENT)
Dept: OTOLARYNGOLOGY | Facility: CLINIC | Age: 64
End: 2024-08-22
Payer: COMMERCIAL

## 2024-08-22 NOTE — TELEPHONE ENCOUNTER
----- Message from Bishnu Mercer sent at 8/22/2024  2:38 PM CDT -----  Contact: Pt  .Type:  Needs Medical Advice    Who Called: pt  Would the patient rather a call back or a response via [facility]?  Call back  Best Call Back Number: [phone]   Additional Information: Pt. Is returning a call back to Vilma regarding her appts. On tomorrow in [place].

## 2024-08-23 ENCOUNTER — TELEPHONE (OUTPATIENT)
Dept: OTOLARYNGOLOGY | Facility: CLINIC | Age: 64
End: 2024-08-23

## 2024-08-23 ENCOUNTER — OFFICE VISIT (OUTPATIENT)
Dept: OTOLARYNGOLOGY | Facility: CLINIC | Age: 64
End: 2024-08-23
Payer: COMMERCIAL

## 2024-08-23 ENCOUNTER — CLINICAL SUPPORT (OUTPATIENT)
Dept: OTOLARYNGOLOGY | Facility: CLINIC | Age: 64
End: 2024-08-23
Payer: COMMERCIAL

## 2024-08-23 ENCOUNTER — TELEPHONE (OUTPATIENT)
Dept: OTOLARYNGOLOGY | Facility: CLINIC | Age: 64
End: 2024-08-23
Payer: COMMERCIAL

## 2024-08-23 VITALS
SYSTOLIC BLOOD PRESSURE: 118 MMHG | WEIGHT: 127.19 LBS | BODY MASS INDEX: 25.69 KG/M2 | HEART RATE: 80 BPM | DIASTOLIC BLOOD PRESSURE: 71 MMHG

## 2024-08-23 DIAGNOSIS — H90.A12 CONDUCTIVE HEARING LOSS OF LEFT EAR WITH RESTRICTED HEARING OF RIGHT EAR: ICD-10-CM

## 2024-08-23 DIAGNOSIS — H93.11 TINNITUS, RIGHT EAR: ICD-10-CM

## 2024-08-23 DIAGNOSIS — T16.1XXD FOREIGN BODY OF RIGHT EAR, SUBSEQUENT ENCOUNTER: ICD-10-CM

## 2024-08-23 DIAGNOSIS — Z96.22 PATENT PRESSURE EQUALIZATION (PE) TUBE ON LEFT SIDE: ICD-10-CM

## 2024-08-23 DIAGNOSIS — H69.93 EUSTACHIAN TUBE DYSFUNCTION, BILATERAL: Primary | ICD-10-CM

## 2024-08-23 DIAGNOSIS — H90.A31 MIXED CONDUCTIVE AND SENSORINEURAL HEARING LOSS OF RIGHT EAR WITH RESTRICTED HEARING OF LEFT EAR: ICD-10-CM

## 2024-08-23 PROCEDURE — 99999 PR PBB SHADOW E&M-EST. PATIENT-LVL IV: CPT | Mod: PBBFAC,,, | Performed by: NURSE PRACTITIONER

## 2024-08-23 PROCEDURE — 99999 PR PBB SHADOW E&M-EST. PATIENT-LVL I: CPT | Mod: PBBFAC,,,

## 2024-08-23 RX ORDER — AZELASTINE 1 MG/ML
1 SPRAY, METERED NASAL 2 TIMES DAILY
Qty: 30 ML | Refills: 11 | Status: SHIPPED | OUTPATIENT
Start: 2024-08-23 | End: 2025-08-23

## 2024-08-23 RX ORDER — METHYLPREDNISOLONE 4 MG/1
TABLET ORAL
Qty: 21 EACH | Refills: 0 | Status: SHIPPED | OUTPATIENT
Start: 2024-08-23 | End: 2024-09-13

## 2024-08-23 RX ORDER — OFLOXACIN 3 MG/ML
3 SOLUTION AURICULAR (OTIC) 2 TIMES DAILY
Qty: 5 ML | Refills: 0 | Status: SHIPPED | OUTPATIENT
Start: 2024-08-23 | End: 2024-09-02

## 2024-08-23 NOTE — TELEPHONE ENCOUNTER
Attempted to return call. No answer, voicemail was left.     ----- Message from Bishnu Mercer sent at 8/23/2024  8:39 AM CDT -----  Contact: Pt   .Type:  Sooner Apoointment Request    Caller is requesting a sooner appointment.  Caller declined first available appointment listed below.  Caller will not accept being placed on the waitlist and is requesting a message be sent to doctor.  Name of Caller:pt  Would the patient rather a call back or a response via [facility]?  Call back  Best Call Back Number:[phone]  Additional Information: pt. Needs to reschedule her appt. For next Wednesday 08/28/2024 with  she is requesting a call back from Vilma.

## 2024-08-23 NOTE — TELEPHONE ENCOUNTER
----- Message from Bishnu Mercer sent at 8/23/2024  2:57 PM CDT -----  Contact: Pt.  .Type:  Needs Medical Advice    Who Called: pt  Would the patient rather a call back or a response via [facility]?  Call back  Best Call Back Number:  [phone]  Additional Information:  Pt is requesting a call back from Vilma regarding change her appt. For next Wednesday/

## 2024-08-23 NOTE — PROGRESS NOTES
"  Chief Complaint   Patient presents with    Follow-up    Hearing Loss    Ear Problem    Tinnitus       HPI 8/9/2024: Kalpana Rivera is a 63 y.o. female who is self-referred for right ear stuffiness",ear fullness which has been present for 1 week ago.  She reports the symptoms have been are constant. She was on Flonase and Zyrtec but she stopped taking Zyrtec about 2 weeks ago.   She has a longstanding history of Eustachian tube dysfunction and Dr. Puga who placed tubes in her ears around 2 years ago.   She denies recent URI and no sinus complaints. She reports of tinnitus.   She saw her dentist recently and was started on clindamycin for possible tooth abscess. She states that she is currently on day 3.     Interval HPI 8/23/2024:  Ms. Acuna was recently seen by me for chronic ETD with obstructed PE tube in the right ear and hearing loss.  She reports ear pressure has improved but she is still having fullness sensation in the right ear. She has completed clindamycin and ofloxacin x 10 days. She has been using Flonase and taking Zyrtec daily. Denies ear pain.       Past Medical History:   Diagnosis Date    Diabetes mellitus, type 2      Social History     Socioeconomic History    Marital status:    Tobacco Use    Smoking status: Never    Smokeless tobacco: Never   Substance and Sexual Activity    Alcohol use: Yes    Drug use: Never     Social Determinants of Health     Financial Resource Strain: Unknown (11/21/2022)    [place] [facility]    Overall Financial Resource Strain (CARDIA)     Difficulty of Paying Living Expenses: Patient declined   Food Insecurity: Unknown (11/21/2022)    Received from [employer], [facility]    Hunger Vital Sign     Worried About Running Out of Food in the Last Year: Patient declined     Ran Out of Food in the Last Year: Patient declined   Transportation Needs: Unknown (11/21/2022)    [place] [facility] [employer]    PRAPARE - Transportation     " Lack of Transportation (Medical): Patient declined     Lack of Transportation (Non-Medical): Patient declined   Physical Activity: Inactive (2022)    [place] [facility]    Exercise Vital Sign     Days of Exercise per Week: 0 days     Minutes of Exercise per Session: 0 min   Stress: No Stress Concern Present (2022)    [place] [facility] [facility] [facility]    [employer] - Occupational Stress Questionnaire     Feeling of Stress : Not at all   Housing Stability: Unknown (2022)    [place] [facility] [employer] [facility]    Housing Stability Vital Sign     Unable to Pay for Housing in the Last Year: Patient refused     In the last 12 months, was there a time when you did not have a steady place to sleep or slept in a shelter (including now)?: Patient refused     Past Surgical History:   Procedure Laterality Date    CARPAL TUNNEL RELEASE       SECTION      2    left thumb      right middle finger      right shoulder       Family History   Problem Relation Name Age of Onset    Diabetes Mother      Parkinsonism Mother      Alzheimer's disease Mother      Diabetes Father      Cancer Father         Review of Systems  General: negative for chills, fever or weight loss  Psychological: negative for mood changes or depression  Ophthalmic: negative for blurry vision, photophobia or eye pain  ENT: see HPI  Respiratory: no cough, shortness of breath, or wheezing  Cardiovascular: no chest pain or dyspnea on exertion  Gastrointestinal: no abdominal pain, change in bowel habits, or black/ bloody stools  Musculoskeletal: negative for gait disturbance or muscular weakness  Neurological: no syncope or seizures; no ataxia  Dermatological: negative for pruritis,  rash and jaundice  Hematologic/lymphatic: no easy bruising, no new adenopathy      Physical Exam:    Vitals:    24 0752   BP: 118/71   Pulse: 80         Constitutional: Well appearing / communicating  without difficutly.  NAD.  Eyes: EOM I Bilaterally  Head/Face: Normocephalic.  Negative paranasal sinus pressure/tenderness.  Salivary glands WNL.  House Brackmann I Bilaterally.    Right Ear: Auricle normal appearance. External Auditory Canal within normal limits no lesions or masses, + PE tube dislodged from TM, TM retracted and erythematous.   Left Ear: Auricle normal appearance. External Auditory Canal within normal limits no lesions or masses, + PE tube is visible and patent  Nose: No gross nasal septal deviation. Inferior Turbinates 3+ bilaterally. No septal perforation. No masses/lesions. External nasal skin appears normal without masses/lesions.  Oral Cavity: Gingiva/lips within normal limits.  Dentition/gingiva healthy appearing. Mucus membranes moist. Floor of mouth soft, no masses palpated. Oral Tongue mobile. Hard Palate appears normal.    Oropharynx: Base of tongue appears normal. No masses/lesions noted. Tonsillar fossa/pharyngeal wall without lesions. Posterior oropharynx WNL.  Soft palate without masses. Midline uvula.   Neck/Lymphatic: No LAD I-VI bilaterally.  No thyromegaly.  No masses noted on exam.    Mirror laryngoscopy/nasopharyngoscopy: Active gag reflex.  Unable to perform.    Neuro/Psychiatric: AOx3.  Normal mood and affect.   Cardiovascular: Normal carotid pulses bilaterally, no increasing jugular venous distention noted at cervical region bilaterally.    Respiratory: Normal respiratory effort, no stridor, no retractions noted.    Foreign Body Removal    Date/Time: 8/23/2024 8:00 AM    Performed by: Dana Magana NP  Authorized by: Dana Magana NP  Consent Done: Not Needed  Body area: ear  Location details: right ear    Patient sedated: no  Patient restrained: no  Patient cooperative: yes  Localization method: ENT speculum and magnification  Removal mechanism: suction  Complexity: simple  Post-procedure assessment: foreign body removed  Patient tolerance: Patient tolerated the  procedure well with no immediate complications  Comments: PE tube in the right ear removed without difficulty      Audiogram interpreted personally by me and discussed in detail with the patient today.   Pure tone testing revealed moderate hearing loss in the right ear and mild rising to normal hearing in the left ear. Tympanometry revealed Type B tympanograms in both ears with ear canal volume of 0.85 ml in the right ear and 7.79 ml in the left ear.           Assessment:    ICD-10-CM ICD-9-CM    1. Eustachian tube dysfunction, bilateral  H69.93 381.81       2. Foreign body of right ear - PE tube  T16.1XXD V58.89 Foreign Body Removal      3. Mixed conductive and sensorineural hearing loss of right ear with restricted hearing of left ear  H90.A31 389.22       4. Conductive hearing loss of left ear with restricted hearing of right ear  H90.A12 389.05       5. Patent pressure equalization (PE) tube on left side  Z96.22 V45.89           The primary encounter diagnosis was Eustachian tube dysfunction, bilateral. Diagnoses of Foreign body of right ear - PE tube, Mixed conductive and sensorineural hearing loss of right ear with restricted hearing of left ear, Conductive hearing loss of left ear with restricted hearing of right ear, and Patent pressure equalization (PE) tube on left side were also pertinent to this visit.      Plan:  Orders Placed This Encounter   Procedures    Foreign Body Removal     -Dislodged PE tube in the right ear removed under microscopy without difficulty.    -continue on ofloxacin otic drop 3 drops in the right ear bid x 10 days  -start on Medrol Dosepak  -start on Azelastine 1 spray in each nostril bid  -continue on Zyrtec 10 mg daily  -continue on Flonase 2 sprays in each nostril daily  -follow up with Dr. Retana if PE tube placement in the right ear is warranted.     Dana Magana NP      Answers submitted by the patient for this visit:  Review of Symptoms Questionnaire  (Submitted on  8/16/2024)  None of these: Yes  hearing loss: Yes  None of these : Yes  None of these: Yes  None of these : Yes  None of these: Yes  None of these: Yes  None of these: Yes  None of these : Yes  None of these: Yes  None of these : Yes  None of these: Yes  None of these: Yes  None of these: Yes

## 2024-08-23 NOTE — PROCEDURES
Foreign Body Removal    Date/Time: 8/23/2024 8:00 AM    Performed by: Dana Magana NP  Authorized by: Dana Magana NP  Consent Done: Not Needed  Body area: ear  Location details: right ear    Patient sedated: no  Patient restrained: no  Patient cooperative: yes  Localization method: ENT speculum and magnification  Removal mechanism: suction  Complexity: simple  Post-procedure assessment: foreign body removed  Patient tolerance: Patient tolerated the procedure well with no immediate complications  Comments: PE tube in the right ear removed without difficulty

## 2024-08-29 ENCOUNTER — TELEPHONE (OUTPATIENT)
Dept: FAMILY MEDICINE | Facility: CLINIC | Age: 64
End: 2024-08-29
Payer: COMMERCIAL

## 2024-08-29 ENCOUNTER — OFFICE VISIT (OUTPATIENT)
Dept: OTOLARYNGOLOGY | Facility: CLINIC | Age: 64
End: 2024-08-29
Payer: COMMERCIAL

## 2024-08-29 ENCOUNTER — CLINICAL SUPPORT (OUTPATIENT)
Dept: OTOLARYNGOLOGY | Facility: CLINIC | Age: 64
End: 2024-08-29
Payer: COMMERCIAL

## 2024-08-29 VITALS
DIASTOLIC BLOOD PRESSURE: 78 MMHG | HEART RATE: 96 BPM | SYSTOLIC BLOOD PRESSURE: 124 MMHG | WEIGHT: 126.63 LBS | BODY MASS INDEX: 25.58 KG/M2

## 2024-08-29 DIAGNOSIS — H69.90 DYSFUNCTION OF EUSTACHIAN TUBE, UNSPECIFIED LATERALITY: Primary | ICD-10-CM

## 2024-08-29 DIAGNOSIS — H69.93 EUSTACHIAN TUBE DYSFUNCTION, BILATERAL: Chronic | ICD-10-CM

## 2024-08-29 DIAGNOSIS — J31.0 CHRONIC RHINITIS: Chronic | ICD-10-CM

## 2024-08-29 DIAGNOSIS — H65.21 CHRONIC SEROUS OTITIS MEDIA, RIGHT EAR: Primary | Chronic | ICD-10-CM

## 2024-08-29 DIAGNOSIS — H90.A31 MIXED CONDUCTIVE AND SENSORINEURAL HEARING LOSS OF RIGHT EAR WITH RESTRICTED HEARING OF LEFT EAR: Chronic | ICD-10-CM

## 2024-08-29 DIAGNOSIS — H93.19 TINNITUS, UNSPECIFIED LATERALITY: ICD-10-CM

## 2024-08-29 PROCEDURE — 3008F BODY MASS INDEX DOCD: CPT | Mod: CPTII,S$GLB,, | Performed by: OTOLARYNGOLOGY

## 2024-08-29 PROCEDURE — 3044F HG A1C LEVEL LT 7.0%: CPT | Mod: CPTII,S$GLB,, | Performed by: OTOLARYNGOLOGY

## 2024-08-29 PROCEDURE — 3078F DIAST BP <80 MM HG: CPT | Mod: CPTII,S$GLB,, | Performed by: OTOLARYNGOLOGY

## 2024-08-29 PROCEDURE — 3074F SYST BP LT 130 MM HG: CPT | Mod: CPTII,S$GLB,, | Performed by: OTOLARYNGOLOGY

## 2024-08-29 PROCEDURE — 99999 PR PBB SHADOW E&M-EST. PATIENT-LVL IV: CPT | Mod: PBBFAC,,, | Performed by: OTOLARYNGOLOGY

## 2024-08-29 PROCEDURE — 99214 OFFICE O/P EST MOD 30 MIN: CPT | Mod: S$GLB,,, | Performed by: OTOLARYNGOLOGY

## 2024-08-29 PROCEDURE — 99999 PR PBB SHADOW E&M-EST. PATIENT-LVL I: CPT | Mod: PBBFAC,,,

## 2024-08-29 PROCEDURE — 1159F MED LIST DOCD IN RCRD: CPT | Mod: CPTII,S$GLB,, | Performed by: OTOLARYNGOLOGY

## 2024-08-29 PROCEDURE — 1160F RVW MEDS BY RX/DR IN RCRD: CPT | Mod: CPTII,S$GLB,, | Performed by: OTOLARYNGOLOGY

## 2024-08-29 NOTE — PROGRESS NOTES
Chief Complaint   Patient presents with    Otitis Media    Tinnitus     Ringing in the right ear and also clogged up        HPI:  Patient is a 63 y.o. female who has previously seen me for ETD and PETs in place in the ears (not placed by me).  She has subsequently seen Dana Magana NP for ongoing ETD and PET has extruded from the ear.   Ms. Magana added azelastine nasal spray to her regimen and had her restart Zyrtec.  She also gave her a prescription for Medrol Dosepak, but the patient has not taken that yet as she was unclear why it had been prescribed.    She does feel her nasal symptoms are better after starting nasal spray and restarting Zyrtec, but she still feels that the right ear is clogged and she has tinnitus in that ear.  She has not been able to get the ear to pop with otologic Valsalva.        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 03/20/2024     Resolved Ambulatory Problems     Diagnosis Date Noted    Type 2 diabetes mellitus without complication, with long-term current use of insulin 08/10/2020    Impaired mobility and activities of daily living 10/13/2021    Class 2 severe obesity with serious comorbidity and body mass index (BMI) of 35.0 to 35.9 in adult, unspecified obesity type 01/09/2024     Past Medical History:   Diagnosis Date    Diabetes mellitus, type 2        Review of Systems  General: negative for chills, fever or weight loss  Psychological: negative for mood changes or depression  Ophthalmic: negative  for blurry vision, photophobia or eye pain  ENT: see HPI  Respiratory: no cough, shortness of breath, or wheezing  Cardiovascular: no chest pain or dyspnea on exertion  Gastrointestinal: no abdominal pain, change in bowel habits, or black/ bloody stools  Musculoskeletal: negative for gait disturbance or muscular weakness  Neurological: no syncope or seizures; no ataxia  Dermatological: negative for pruritis, rash and jaundice  Hematologic/lymphatic: no easy bruising, no new adenopathy    Physical Exam     Vitals:    08/29/24 1304   BP: 124/78   Pulse: 96         Constitutional:   She is oriented to person, place, and time. Vital signs are normal. She appears well-developed and well-nourished. She appears alert. She is cooperative. Normal speech.      Head:  Normocephalic and atraumatic. Salivary glands normal.  Facial strength is normal.      Ears:    Right Ear: Tympanic membrane is injected and retracted. Tympanic membrane mobility is abnormal. A middle ear effusion is present.   Left Ear:  No middle ear effusion. A PE tube (In place and patent) is seen.     Nose:  Mucosal edema present. No rhinorrhea, septal deviation or polyps. Turbinates abnormal and turbinate hypertrophy (3+, boggy, congested mucosa).  Right sinus exhibits no maxillary sinus tenderness and no frontal sinus tenderness. Left sinus exhibits no maxillary sinus tenderness and no frontal sinus tenderness.     Mouth/Throat  Oropharynx clear and moist without lesions or asymmetry, lips, teeth, and gums normal and oropharynx normal. No mucous membrane lesions. No oropharyngeal exudate, posterior oropharyngeal edema or posterior oropharyngeal erythema. Mirror exam not performed due to patient tolerance.  Mirror exam not performed due to patient tolerance.      Neck:  Neck normal without thyromegaly masses, asymmetry, normal tracheal structure, crepitus, and tenderness, thyroid normal, trachea normal, phonation normal, full range of motion with neck  supple and no adenopathy. No JVD present. Carotid bruit is not present. No thyroid mass and no thyromegaly present.     She has no cervical adenopathy.     Cardiovascular:    Normal rate, regular rhythm and rate and rhythm, heart sounds, and pulses normal.              Pulmonary/Chest:   Effort and breath sounds normal.     Psychiatric:   She has a normal mood and affect. Her speech is normal and behavior is normal.     Neurological:   She is alert and oriented to person, place, and time. She has neurological normal, alert and oriented. No cranial nerve deficit.     Skin:   No abrasions, lacerations, lesions, or rashes.         Audiogram: Interpreted by me and reviewed with the patient today.  Tympanograms type B bilaterally.  Left ear with large volume consistent with patent PE tube.            Assessment/Plan:      ICD-10-CM ICD-9-CM    1. Chronic serous otitis media, right ear  H65.21 381.10       2. Eustachian tube dysfunction, bilateral  H69.93 381.81       3. Mixed conductive and sensorineural hearing loss of right ear with restricted hearing of left ear  H90.A31 389.22       4. Tinnitus, unspecified laterality  H93.19 388.30       5. Chronic rhinitis  J31.0 472.0         Patient will continue Flonase and Dymista daily.  She will continue her Zyrtec daily.    Eustachian tube dysfunction handout was given and patient was encouraged to attempt otologic Valsalva multiple times daily.    She will start Medrol Dosepak today.    She will follow-up in approximately 2 weeks for reexamination and repeat tympanograms.  If at that time serous otitis has not resolved, she will undergo PE tube in the right ear.    Sarah Retana MD  [facility]

## 2024-08-29 NOTE — PROGRESS NOTES
Kalpana Arnoldjohn Rivera, a 63 y.o. female was seen today in the clinic for tympanometry only prior to follow up for eustachian tube dysfunction.      Tympanometry revealed Type B tympanograms in both ears  with ear canal volume of 0.94 ml in the right ear and 7.50 ml  tympanogram in the left ear (PE tube in place).

## 2024-08-29 NOTE — PATIENT INSTRUCTIONS
Continue flonase and azelastine.   Continue daily zyrtec.    Continue popping the ears daily as able.   Start steroid tomorrow.      We discussed PET placement in the right ear.    We will decide on this at next visit after examination and tympanograms.     How do you use a Nasal Corticosteroid Spray?    Make sure you understand your dosing instructions. Spray only the number of prescribed sprays in each nostril. Read the package instructions before using your spray the first time.    Most corticosteroid sprays suggest the following steps:    Wash your hands well.    Gently blow your nose to clear the passageway.    Shake the container several times.    Tilt your head slightly downward.  Use the opposite hand from the nostril you will be spraying to hold the spray bottle.    Block one nostril with your finger.  Insert the nasal applicator into the other nostril.    Aim the spray toward the outer wall of the nostril.  Inhale slowly through the nose and press the .    Breathe out and repeat to apply the prescribed number of sprays.  Repeat these steps for the other nostril.     Avoid sneezing or blowing your nose right after spraying.

## 2024-08-29 NOTE — TELEPHONE ENCOUNTER
----- Message from Sarah Royal sent at 8/28/2024 11:01 AM CDT -----  Regarding: [employer]  Type: [employer]     Who Called: [employer]   Would the patient rather a call back or a response via [facility]? Call  Best Call Back Number: [phone] [facility]   Additional Information: calling in regards to claims that were submitted to the insurance company but the pt has only been seen 3 time with       Dates Was billed- August 1 2023 , September 1,2023 , October 1,2023 Nov 1, 2023 , Dec 1,2023 , Jan 1, 2024 , Feb 1,2024 , March 1, 2024 , April 1,2024 , May 1 , 2024

## 2024-09-16 DIAGNOSIS — E10.3393 TYPE 1 DIABETES MELLITUS WITH MODERATE NONPROLIFERATIVE RETINOPATHY OF BOTH EYES WITHOUT MACULAR EDEMA: Primary | ICD-10-CM

## 2024-09-27 DIAGNOSIS — E11.3393 TYPE 2 DIABETES MELLITUS WITH BOTH EYES AFFECTED BY MODERATE NONPROLIFERATIVE RETINOPATHY WITHOUT MACULAR EDEMA, WITH LONG-TERM CURRENT USE OF INSULIN: ICD-10-CM

## 2024-09-27 DIAGNOSIS — Z79.4 TYPE 2 DIABETES MELLITUS WITH BOTH EYES AFFECTED BY MODERATE NONPROLIFERATIVE RETINOPATHY WITHOUT MACULAR EDEMA, WITH LONG-TERM CURRENT USE OF INSULIN: ICD-10-CM

## 2024-09-27 DIAGNOSIS — E66.01 CLASS 2 SEVERE OBESITY WITH SERIOUS COMORBIDITY AND BODY MASS INDEX (BMI) OF 35.0 TO 35.9 IN ADULT, UNSPECIFIED OBESITY TYPE: ICD-10-CM

## 2024-09-27 RX ORDER — TIRZEPATIDE 5 MG/.5ML
5 INJECTION, SOLUTION SUBCUTANEOUS
Qty: 4 PEN | Refills: 6 | Status: SHIPPED | OUTPATIENT
Start: 2024-09-27

## 2024-11-11 ENCOUNTER — TELEPHONE (OUTPATIENT)
Dept: ENDOCRINOLOGY | Facility: CLINIC | Age: 64
End: 2024-11-11
Payer: COMMERCIAL

## 2024-12-04 DIAGNOSIS — Z78.0 MENOPAUSE: ICD-10-CM

## 2025-01-06 ENCOUNTER — TELEPHONE (OUTPATIENT)
Dept: FAMILY MEDICINE | Facility: CLINIC | Age: 65
End: 2025-01-06
Payer: COMMERCIAL

## 2025-01-06 NOTE — TELEPHONE ENCOUNTER
----- Message from Melodie sent at 1/6/2025  9:00 AM CST -----  .Type:  Needs Medical Advice    Who Called: pt    Would the patient rather a call back or a response via [facility]? Call back  Best Call Back Number: [phone]  Additional Information:     Pt stated she missed a call and would like a call back

## 2025-01-08 ENCOUNTER — TELEPHONE (OUTPATIENT)
Dept: FAMILY MEDICINE | Facility: CLINIC | Age: 65
End: 2025-01-08
Payer: COMMERCIAL

## 2025-01-08 NOTE — TELEPHONE ENCOUNTER
Spoke with pt in regards of her message. Pt rescheduled her apt for her annual for 1/14/2024 with DAVID Yin.

## 2025-01-08 NOTE — TELEPHONE ENCOUNTER
----- Message from Kristin sent at 1/8/2025  1:28 PM CST -----  Patient Returning a call    Who is calling?  Patient  Who called patient?  Amanda  Call back or [facility] message?  Call back/ if unable to reach please message through portal  Call back number: [phone]

## 2025-01-16 ENCOUNTER — PATIENT MESSAGE (OUTPATIENT)
Dept: ENDOCRINOLOGY | Facility: CLINIC | Age: 65
End: 2025-01-16
Payer: COMMERCIAL

## 2025-01-16 ENCOUNTER — OFFICE VISIT (OUTPATIENT)
Dept: FAMILY MEDICINE | Facility: CLINIC | Age: 65
End: 2025-01-16
Payer: COMMERCIAL

## 2025-01-16 VITALS
SYSTOLIC BLOOD PRESSURE: 132 MMHG | HEIGHT: 59 IN | TEMPERATURE: 99 F | BODY MASS INDEX: 25 KG/M2 | WEIGHT: 124 LBS | OXYGEN SATURATION: 97 % | HEART RATE: 96 BPM | DIASTOLIC BLOOD PRESSURE: 70 MMHG

## 2025-01-16 DIAGNOSIS — G57.13 MERALGIA PARESTHETICA, BILATERAL LOWER LIMBS: ICD-10-CM

## 2025-01-16 DIAGNOSIS — E04.1 THYROID NODULE: ICD-10-CM

## 2025-01-16 DIAGNOSIS — Z86.39 HISTORY OF OBESITY: ICD-10-CM

## 2025-01-16 DIAGNOSIS — Z00.00 ANNUAL PHYSICAL EXAM: Primary | ICD-10-CM

## 2025-01-16 DIAGNOSIS — E11.3393 TYPE 2 DIABETES MELLITUS WITH BOTH EYES AFFECTED BY MODERATE NONPROLIFERATIVE RETINOPATHY WITHOUT MACULAR EDEMA, WITH LONG-TERM CURRENT USE OF INSULIN: ICD-10-CM

## 2025-01-16 DIAGNOSIS — Z79.4 TYPE 2 DIABETES MELLITUS WITH BOTH EYES AFFECTED BY MODERATE NONPROLIFERATIVE RETINOPATHY WITHOUT MACULAR EDEMA, WITH LONG-TERM CURRENT USE OF INSULIN: ICD-10-CM

## 2025-01-16 PROBLEM — E10.9 TYPE 1 DIABETES MELLITUS WITHOUT COMPLICATION: Status: RESOLVED | Noted: 2024-03-20 | Resolved: 2025-01-16

## 2025-01-16 PROBLEM — E78.5 DYSLIPIDEMIA: Status: RESOLVED | Noted: 2024-03-20 | Resolved: 2025-01-16

## 2025-01-16 PROCEDURE — 1126F AMNT PAIN NOTED NONE PRSNT: CPT | Mod: CPTII,S$GLB,, | Performed by: NURSE PRACTITIONER

## 2025-01-16 PROCEDURE — 1159F MED LIST DOCD IN RCRD: CPT | Mod: CPTII,S$GLB,, | Performed by: NURSE PRACTITIONER

## 2025-01-16 PROCEDURE — 99999 PR PBB SHADOW E&M-EST. PATIENT-LVL IV: CPT | Mod: PBBFAC,,, | Performed by: NURSE PRACTITIONER

## 2025-01-16 PROCEDURE — 3078F DIAST BP <80 MM HG: CPT | Mod: CPTII,S$GLB,, | Performed by: NURSE PRACTITIONER

## 2025-01-16 PROCEDURE — 3044F HG A1C LEVEL LT 7.0%: CPT | Mod: CPTII,S$GLB,, | Performed by: NURSE PRACTITIONER

## 2025-01-16 PROCEDURE — 1101F PT FALLS ASSESS-DOCD LE1/YR: CPT | Mod: CPTII,S$GLB,, | Performed by: NURSE PRACTITIONER

## 2025-01-16 PROCEDURE — 3008F BODY MASS INDEX DOCD: CPT | Mod: CPTII,S$GLB,, | Performed by: NURSE PRACTITIONER

## 2025-01-16 PROCEDURE — 1160F RVW MEDS BY RX/DR IN RCRD: CPT | Mod: CPTII,S$GLB,, | Performed by: NURSE PRACTITIONER

## 2025-01-16 PROCEDURE — 3288F FALL RISK ASSESSMENT DOCD: CPT | Mod: CPTII,S$GLB,, | Performed by: NURSE PRACTITIONER

## 2025-01-16 PROCEDURE — 3075F SYST BP GE 130 - 139MM HG: CPT | Mod: CPTII,S$GLB,, | Performed by: NURSE PRACTITIONER

## 2025-01-16 PROCEDURE — 99397 PER PM REEVAL EST PAT 65+ YR: CPT | Mod: S$GLB,,, | Performed by: NURSE PRACTITIONER

## 2025-01-16 NOTE — PROGRESS NOTES
Patient ID: Kalpana Rivera is a 64 y.o. female.     Chief Complaint: Annual Exam      HPI:  Kalpana Rivera presents to clinic today for Annual Wellness exam. The chart was reviewed, recent lab and imaging results reviewed, and the problem list updated. We discussed current medical diagnosis, current medications, medical/surgical/family/social history; updated provider list; documented vital signs; identified any cognitive impairment; and updated risk factor list. Addressed any outstanding health maintenance. The patient reports compliance with the treatment plan and has no complaints today.           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 03/20/2024    Meralgia paresthetica, bilateral lower limbs 03/24/2023     Symptoms have been minimal  Managed with Nsaids when having pain       Type 2 diabetes mellitus with both eyes affected by moderate nonproliferative retinopathy without macular edema, with long-term current use of insulin 09/21/2020     Managed by endo   Mounjaro 5mg   tresiba 16 units QHS; novolog SS with meals   Metformin 1000 BID   No ACE/ARB BP not able to tolerate  Pravastatin every other day   dexCom  G7      Thyroid nodule 09/21/2020     Managed by endo   Stable  Asymptomatic   Due for US in 2/2025      History of obesity 08/10/2020     Mounjaro 5mg has really helped with weightloss in addition to the diet and exercise  Has lost about 50 pounds  No medication side effects             Review of Systems  Review of Systems   Constitutional: Negative.    HENT: Negative.     Eyes: Negative.    Respiratory: Negative.     Cardiovascular: Negative.    Gastrointestinal: Negative.    Genitourinary: Negative.    Musculoskeletal: Negative.    Skin: Negative.    Neurological: Negative.    Endo/Heme/Allergies: Negative.    Psychiatric/Behavioral: Negative.         Currently  "Medications  Current Outpatient Medications on File Prior to Visit   Medication Sig Dispense Refill    azelastine (ASTELIN) 137 mcg (0.1 %) nasal spray 1 spray (137 mcg total) by Nasal route 2 (two) times daily. 30 mL 11    blood-glucose sensor (DEXCOM G7 SENSOR) Brittany CHANGE EVERY 10 DAYS 3 each 11    cetirizine (ZYRTEC) 10 MG tablet Take 10 mg by mouth once daily.      fluticasone propionate (FLONASE) 50 mcg/actuation nasal spray 1 spray by Each Nostril route once daily.      insulin aspart U-100 (NOVOLOG FLEXPEN U-100 INSULIN) 100 unit/mL (3 mL) InPn pen INJECT 3 UNITS WITH BREAKFAST, 5 UNITS WITH LUNCH, AND 5 UNITS WITH DINNER, INJECT 1-2 UNITS WITH SNACKS. 45 mL 1    insulin degludec (TRESIBA U-100 INSULIN) 100 unit/mL injection Inject 0.07 mLs (7 Units total) into the skin daily with breakfast AND 0.06 mLs (6 Units total) daily with dinner or evening meal. 4 mL 11    Lactobacillus rhamnosus GG (CULTURELLE) 10 billion cell capsule Take 1 capsule by mouth once daily.      metFORMIN (GLUCOPHAGE-XR) 500 MG ER 24hr tablet Take 2 tablets (1,000 mg total) by mouth 2 (two) times daily with meals. TAKE TWO TABLETS BY MOUTH TWICE A DAY WITH MEALS 360 tablet 3    multivitamin with minerals tablet Take 1 tablet by mouth once daily.      pen needle, diabetic (BD ULTRA-FINE SHORT PEN NEEDLE) 31 gauge x 5/16" Ndle USE TO INJECT INSULIN FOUR TIMES A  each 6    pravastatin (PRAVACHOL) 10 MG tablet Take 1 tablet (10 mg total) by mouth every evening. 90 tablet 3    tirzepatide (MOUNJARO) 5 mg/0.5 mL PnIj Inject 5 mg (one pen) into the skin every 7 days. 4 Pen 6    TURMERIC ORAL Take by mouth.      UNABLE TO FIND Nerve Eight natural supplement      UNABLE TO FIND [place] natural supplement      VITAMIN B COMPLEX ORAL Take 1 tablet by mouth.      [DISCONTINUED] tirzepatide (MOUNJARO) 2.5 mg/0.5 mL PnIj Inject 2.5 mg into the skin every 7 days. 4 Pen 3    [DISCONTINUED] UNABLE TO FIND Cranberry & Buchu natural supplement   "     No current facility-administered medications on file prior to visit.       Allergies  Review of patient's allergies indicates:   Allergen Reactions    Azithromycin Anaphylaxis        Health Maintenance  You are up to date for your primary preventive health care, and there are no reminders at this time.     PMH:  Past Medical History:   Diagnosis Date    Diabetes mellitus, type 2       Past Surgical History:   Procedure Laterality Date    CARPAL TUNNEL RELEASE       SECTION      2    left thumb      right middle finger      right shoulder        Social History     Socioeconomic History    Marital status:    Tobacco Use    Smoking status: Never    Smokeless tobacco: Never   Substance and Sexual Activity    Alcohol use: Yes    Drug use: Never     Social Drivers of Health     Financial Resource Strain: Low Risk  (2025)    Overall Financial Resource Strain (CARDIA)     Difficulty of Paying Living Expenses: Not hard at all   Food Insecurity: No Food Insecurity (2025)    Hunger Vital Sign     Worried About Running Out of Food in the Last Year: Never true     Ran Out of Food in the Last Year: Never true   Transportation Needs: Unknown (2022)    [facility]    PRAPARE - Transportation     Lack of Transportation (Medical): Patient declined     Lack of Transportation (Non-Medical): Patient declined   Physical Activity: Inactive (2025)    Exercise Vital Sign     Days of Exercise per Week: 0 days     Minutes of Exercise per Session: 0 min   Stress: No Stress Concern Present (2025)    [employer] [employer] - Occupational Stress Questionnaire     Feeling of Stress : Not at all   Housing Stability: Unknown (2025)    Housing Stability Vital Sign     Unable to Pay for Housing in the Last Year: No      Family History   Problem Relation Name Age of Onset    Diabetes Mother      Parkinsonism Mother      Alzheimer's disease Mother       "Diabetes Father      Cancer Father            Physical  Exam  Vitals:    01/16/25 0905   BP: 132/70   BP Location: Right arm   Patient Position: Sitting   Pulse: 96   Temp: 98.8 °F (37.1 °C)   TempSrc: Temporal   SpO2: 97%   Weight: 56.3 kg (124 lb 0.1 oz)   Height: 4' 11" (1.499 m)      Body mass index is 25.05 kg/m².  Wt Readings from Last 3 Encounters:   01/16/25 56.3 kg (124 lb 0.1 oz)   08/29/24 57.5 kg (126 lb 10.5 oz)   08/23/24 57.7 kg (127 lb 3.3 oz)     LMP      Physical Exam  Vitals and nursing note reviewed.   Constitutional:       Appearance: Normal appearance.   HENT:      Head: Normocephalic.      Right Ear: Tympanic membrane and ear canal normal.      Left Ear: Tympanic membrane and ear canal normal.      Nose: Nose normal.      Mouth/Throat:      Mouth: Mucous membranes are moist.   Eyes:      Pupils: Pupils are equal, round, and reactive to light.   Cardiovascular:      Rate and Rhythm: Normal rate and regular rhythm.      Pulses: Normal pulses.      Heart sounds: Normal heart sounds.   Pulmonary:      Effort: Pulmonary effort is normal.      Breath sounds: Normal breath sounds.   Abdominal:      General: Bowel sounds are normal.      Palpations: Abdomen is soft.   Musculoskeletal:         General: Normal range of motion.   Skin:     General: Skin is warm and dry.      Capillary Refill: Capillary refill takes less than 2 seconds.   Neurological:      Mental Status: She is alert and oriented to person, place, and time.   Psychiatric:         Mood and Affect: Mood normal.         Behavior: Behavior normal.         Labs:    A1C:  Recent Labs   Lab 03/13/24 0752 09/05/24  0737 01/14/25  0728   Hemoglobin A1C 6.4 H 6.6 H 6.7 H     CBC:  Recent Labs   Lab 01/14/25  0728   WBC 6.88   RBC 4.51   Hemoglobin 12.8   Hematocrit 37.8   Platelets 211   MCV 84   MCH 28.4   MCHC 33.9     CMP:  Recent Labs   Lab 03/13/24  0752 09/05/24  0737 01/14/25  0728   Glucose 109 124 H 126 H   Calcium 9.1 9.2 9.1   Albumin " 4.1 3.8 3.8   Total Protein 6.7 6.4 6.6   Sodium 143 138 138   Potassium 4.2 4.8 4.1   CO2 29 27 29   Chloride 103 102 102   BUN 6 L 6 L 8   Creatinine 0.49 L 0.7 0.6   Alkaline Phosphatase 59 57 54   ALT 20 20 17   AST 24 15 19   Total Bilirubin 0.5 0.5 0.4     LIPIDS:  Recent Labs   Lab 10/06/23  0833 03/13/24  0752 09/05/24  0737 01/14/25  0728   TSH  --    < > 0.935  --    HDL 70  --  53 53   Cholesterol 140  --  124 120   Triglycerides 43  --  53 50   LDL Cholesterol 61.4 L  --  60.4 L 57.0 L   HDL/Cholesterol Ratio 50.0  --  42.7 44.2   Non-HDL Cholesterol 70  --  71 67   Total Cholesterol/HDL Ratio 2.0  --  2.3 2.3    < > = values in this interval not displayed.     TSH:  Recent Labs   Lab 01/30/23  0830 03/13/24  0752 09/05/24  0737   TSH 2.069 1.360 0.935              Assessment:      1. Annual physical exam    2. Thyroid nodule    3. Type 2 diabetes mellitus with both eyes affected by moderate nonproliferative retinopathy without macular edema, with long-term current use of insulin    4. Meralgia paresthetica, bilateral lower limbs    5. History of obesity          Plan:  Annual physical exam  Well female  Labs reviewed in detail  HM discussed  UTD  Problem list reviewed in detail   Continue healthy lifestyle efforts  Continue current meds as prescribed otherwise; refills per request  Keep routine specialist f/u   RTC in 1 year with labs prior and/or PRN        Thyroid nodule  Assessment & Plan:  Continue f/u with [facility] for routine monitoring of labs and US       Type 2 diabetes mellitus with both eyes affected by moderate nonproliferative retinopathy without macular edema, with long-term current use of insulin  Assessment & Plan:  Continue current management per Agata Sharma      Meralgia paresthetica, bilateral lower limbs  Assessment & Plan:  Continue current treatment  Weight loss has helped tremendously       History of obesity  Assessment & Plan:  Continue Mounjaro    Dexa scan orders in chart, will  have done with thyroid US.       Follow up in about 1 year (around 1/16/2026).       MICKEY Oconnell  [facility]  1/16/25

## 2025-01-17 ENCOUNTER — TELEPHONE (OUTPATIENT)
Dept: ENDOCRINOLOGY | Facility: CLINIC | Age: 65
End: 2025-01-17
Payer: COMMERCIAL

## 2025-01-17 NOTE — TELEPHONE ENCOUNTER
Reached out to patient about switching over to virtual due to the weather but the clinic is still open.

## 2025-01-29 ENCOUNTER — TELEPHONE (OUTPATIENT)
Dept: OTOLARYNGOLOGY | Facility: CLINIC | Age: 65
End: 2025-01-29
Payer: COMMERCIAL

## 2025-01-29 NOTE — TELEPHONE ENCOUNTER
Called and left message informinf patient that we have moved forward and canceled the secured appt because we have nothing sooner than 2/7/2025 and will keep her posted if something comes available   ----- Message from [employer] sent at 1/28/2025  6:04 PM CST -----  Type:  Appointment Request     Name of Caller:Pt  When is the first available appointment?Feb 13  Symptoms:earache  Would the patient rather a call back or a response via [facility]? Call back  Best Call Back Number:[phone]   Additional Information: sooner appt

## 2025-01-31 ENCOUNTER — OFFICE VISIT (OUTPATIENT)
Dept: FAMILY MEDICINE | Facility: CLINIC | Age: 65
End: 2025-01-31
Payer: COMMERCIAL

## 2025-01-31 VITALS
TEMPERATURE: 98 F | OXYGEN SATURATION: 95 % | HEART RATE: 86 BPM | WEIGHT: 118.38 LBS | SYSTOLIC BLOOD PRESSURE: 132 MMHG | HEIGHT: 59 IN | DIASTOLIC BLOOD PRESSURE: 68 MMHG | BODY MASS INDEX: 23.87 KG/M2

## 2025-01-31 DIAGNOSIS — H66.005 RECURRENT ACUTE SUPPURATIVE OTITIS MEDIA WITHOUT SPONTANEOUS RUPTURE OF LEFT TYMPANIC MEMBRANE: Primary | ICD-10-CM

## 2025-01-31 PROCEDURE — 3078F DIAST BP <80 MM HG: CPT | Mod: CPTII,S$GLB,, | Performed by: NURSE PRACTITIONER

## 2025-01-31 PROCEDURE — 1125F AMNT PAIN NOTED PAIN PRSNT: CPT | Mod: CPTII,S$GLB,, | Performed by: NURSE PRACTITIONER

## 2025-01-31 PROCEDURE — 3075F SYST BP GE 130 - 139MM HG: CPT | Mod: CPTII,S$GLB,, | Performed by: NURSE PRACTITIONER

## 2025-01-31 PROCEDURE — 99214 OFFICE O/P EST MOD 30 MIN: CPT | Mod: S$GLB,,, | Performed by: NURSE PRACTITIONER

## 2025-01-31 PROCEDURE — 3288F FALL RISK ASSESSMENT DOCD: CPT | Mod: CPTII,S$GLB,, | Performed by: NURSE PRACTITIONER

## 2025-01-31 PROCEDURE — 3044F HG A1C LEVEL LT 7.0%: CPT | Mod: CPTII,S$GLB,, | Performed by: NURSE PRACTITIONER

## 2025-01-31 PROCEDURE — 3008F BODY MASS INDEX DOCD: CPT | Mod: CPTII,S$GLB,, | Performed by: NURSE PRACTITIONER

## 2025-01-31 PROCEDURE — 1101F PT FALLS ASSESS-DOCD LE1/YR: CPT | Mod: CPTII,S$GLB,, | Performed by: NURSE PRACTITIONER

## 2025-01-31 PROCEDURE — 1160F RVW MEDS BY RX/DR IN RCRD: CPT | Mod: CPTII,S$GLB,, | Performed by: NURSE PRACTITIONER

## 2025-01-31 PROCEDURE — 1159F MED LIST DOCD IN RCRD: CPT | Mod: CPTII,S$GLB,, | Performed by: NURSE PRACTITIONER

## 2025-01-31 PROCEDURE — 99999 PR PBB SHADOW E&M-EST. PATIENT-LVL IV: CPT | Mod: PBBFAC,,, | Performed by: NURSE PRACTITIONER

## 2025-01-31 RX ORDER — AMOXICILLIN 875 MG/1
875 TABLET, FILM COATED ORAL EVERY 12 HOURS
Qty: 14 TABLET | Refills: 0 | Status: SHIPPED | OUTPATIENT
Start: 2025-01-31

## 2025-01-31 NOTE — PROGRESS NOTES
Patient ID: Kalpana Rivera is a 64 y.o. female.     Chief Complaint: Otalgia (Left ear ache, since Tuesday 1/28)      HPI:  History of Present Illness    CHIEF COMPLAINT:  Patient presents today for left ear pain and drainage    HISTORY OF PRESENT ILLNESS:  She reports sudden onset of excruciating left ear pain Tuesday morning after showering. Clear drainage was noted Wednesday night with soaking of tissue. She observed slight bloody drainage when cleaning ear with cotton swab the following morning. She took Motrin for the first couple days of ear pain with mild improvement in symptoms.    ENT HISTORY:  She has a history of ear tubes. The right ear tube recently came out. Prior to having ear tubes placed, she experienced ear pain with flying and required frequent visits to the ENT. She is diligent about protecting her ears during showers and hair appointments, using ear plugs when necessary.    REVIEW OF SYSTEMS:  She denies fever, congestion, scratchy throat, or allergy symptoms.    MEDICATIONS:  She takes Zyrtec daily for allergy management.      ROS:  General: -fever, -chills, -fatigue, -weight gain, -weight loss  Eyes: -vision changes, -redness, -discharge  ENT: +ear pain, -nasal congestion, -sore throat, +ear discharge  Cardiovascular: -chest pain, -palpitations, -lower extremity edema  Respiratory: -cough, -shortness of breath  Gastrointestinal: -abdominal pain, -nausea, -vomiting, -diarrhea, -constipation, -blood in stool  Genitourinary: -dysuria, -hematuria, -frequency  Musculoskeletal: -joint pain, -muscle pain  Skin: -rash, -lesion  Neurological: -headache, -dizziness, -numbness, -tingling  Psychiatric: -anxiety, -depression, -sleep difficulty  Allergic: -allergic reactions            Currently Medications  Current Outpatient Medications on File Prior to Visit   Medication Sig Dispense Refill    azelastine (ASTELIN) 137 mcg (0.1 %) nasal spray 1 spray (137 mcg total) by Nasal route 2 (two) times  "daily. 30 mL 11    blood-glucose sensor (DEXCOM G7 SENSOR) Brittany CHANGE EVERY 10 DAYS 3 each 11    cetirizine (ZYRTEC) 10 MG tablet Take 10 mg by mouth once daily.      fluticasone propionate (FLONASE) 50 mcg/actuation nasal spray 1 spray by Each Nostril route once daily.      insulin aspart U-100 (NOVOLOG FLEXPEN U-100 INSULIN) 100 unit/mL (3 mL) InPn pen INJECT 3 UNITS WITH BREAKFAST, 5 UNITS WITH LUNCH, AND 5 UNITS WITH DINNER, INJECT 1-2 UNITS WITH SNACKS. 45 mL 1    insulin degludec (TRESIBA U-100 INSULIN) 100 unit/mL injection Inject 0.07 mLs (7 Units total) into the skin daily with breakfast AND 0.06 mLs (6 Units total) daily with dinner or evening meal. 4 mL 11    Lactobacillus rhamnosus GG (CULTURELLE) 10 billion cell capsule Take 1 capsule by mouth once daily.      metFORMIN (GLUCOPHAGE-XR) 500 MG ER 24hr tablet Take 2 tablets (1,000 mg total) by mouth 2 (two) times daily with meals. TAKE TWO TABLETS BY MOUTH TWICE A DAY WITH MEALS 360 tablet 3    multivitamin with minerals tablet Take 1 tablet by mouth once daily.      pen needle, diabetic (BD ULTRA-FINE SHORT PEN NEEDLE) 31 gauge x 5/16" Ndle USE TO INJECT INSULIN FOUR TIMES A  each 6    pravastatin (PRAVACHOL) 10 MG tablet Take 1 tablet (10 mg total) by mouth every evening. 90 tablet 3    tirzepatide (MOUNJARO) 5 mg/0.5 mL PnIj Inject 5 mg (one pen) into the skin every 7 days. 4 Pen 6    TURMERIC ORAL Take by mouth.      UNABLE TO FIND Nerve Eight natural supplement      UNABLE TO FIND [place] natural supplement      VITAMIN B COMPLEX ORAL Take 1 tablet by mouth.       No current facility-administered medications on file prior to visit.       Allergies  Review of patient's allergies indicates:   Allergen Reactions    Azithromycin Anaphylaxis        Health Maintenance  Health Maintenance Topics with due status: Not Due       Topic Last Completion Date    TETANUS VACCINE 10/21/2016    Colorectal Cancer Screening 12/05/2019    Mammogram 05/03/2024    " "Diabetic Eye Exam 2024    Diabetes Urine Screening 2024    Lipid Panel 2025    Hemoglobin A1c 2025    Low Dose Statin 2025          PMH:  Past Medical History:   Diagnosis Date    Diabetes mellitus, type 2       Past Surgical History:   Procedure Laterality Date    CARPAL TUNNEL RELEASE       SECTION      2    left thumb      right middle finger      right shoulder            Physical  Exam  Vitals:    25 0920   BP: 132/68   BP Location: Left arm   Patient Position: Sitting   Pulse: 86   Temp: 97.9 °F (36.6 °C)   TempSrc: Temporal   SpO2: 95%   Weight: 53.7 kg (118 lb 6.2 oz)   Height: 4' 11" (1.499 m)      Body mass index is 23.91 kg/m².  Wt Readings from Last 3 Encounters:   25 53.7 kg (118 lb 6.2 oz)   25 56.3 kg (124 lb 0.1 oz)   24 57.5 kg (126 lb 10.5 oz)        Physical Exam    General: No acute distress. Well-developed. Well-nourished.  Eyes: EOMI. Sclerae anicteric.  HENT: Normocephalic. Atraumatic. Nares patent. Moist oral mucosa.  Ears: Tiny bit of fluid in the ear. Super inflamed around the tube in the ear. Pimple-like appearance above the tube in the ear. Redness above the tube in the ear. Cerumen surrounding the tube in the ear.  Cardiovascular: Regular rate. Regular rhythm. No murmurs. No rubs. No gallops. Normal S1, S2.  Respiratory: Normal respiratory effort. Clear to auscultation bilaterally. No rales. No rhonchi. No wheezing.  Abdomen: Soft. Non-tender. Non-distended. Normoactive bowel sounds.  Musculoskeletal: No  obvious deformity.  Extremities: No lower extremity edema.  Neurological: Alert & oriented x3. No slurred speech. Normal gait.  Psychiatric: Normal mood. Normal affect. Good insight. Good judgment.  Skin: Warm. Dry. No rash.              Assessment/Plan:    Recurrent acute suppurative otitis media without spontaneous rupture of left tympanic membrane     Assessment & Plan    IMPRESSION:  - Suspect ear infection based on " symptoms and exam findings  - Decision to treat with both topical and oral antibiotics due to patient's history  - Considered possibility of eustachian tube dysfunction contributing to ongoing issues    EAR INFECTION (LEFT EAR):  - Assessed the patient's condition as an ear infection in the left ear.  - Observed super inflamed area around the tube in the left ear, with a little red drainage and wax surrounding the tube.  - Noted that the patient has had excruciating pain since Tuesday, with minimal improvement.  - Explained the function of eustachian tubes and how their structure can lead to fluid buildup and infections.  - Prescribed oral antibiotics and ear drops to be administered 3 times daily.  - Instructed the patient to place cotton in the ear for 20-30 minutes after applying ear drops to keep them in place.  - Advised the patient to return to the office by Monday if not feeling better.  - Confirmed the presence of a tube in the patient's left ear during exam.  - Discussed how ear tubes help with drainage and prevent fluid accumulation.  - Noted that patient reports sudden, excruciating pain in the left ear that started Tuesday morning and has continued.  - Prescribed ear drops and oral antibiotics for pain relief and to treat the underlying infection.    EAR TUBE MANAGEMENT:  - Noted that patient reports the tube in the right ear came out during a previous ENT visit.  - Explained that sometimes the eardrum gets irritated and tries to push the tube out.  - Patient to continue using earplugs when showering or getting hair washed to prevent water entry.    ALLERGIES:  - Acknowledged the patient's history of allergies and explained how it might relate to ear problems.  - Advised the patient to continue taking Zyrtec daily as currently prescribed for allergy management.    ENT REFERRAL:  - Recommend making an ENT appointment for February 7th as a follow-up.  - Follow up with ENT on February 7th as a  precaution.    FOLLOW UP:  - Informed the patient that they can stop by the office without an appointment for an ear check if needed.  - Requested the patient to message the doctor to inform about their condition.          Orders Placed This Encounter    amoxicillin (AMOXIL) 875 MG tablet BID         Follow up if symptoms worsen or fail to improve.       This note was generated with the assistance of ambient listening technology. Verbal consent was obtained by the patient and accompanying visitor(s) for the recording of patient appointment to facilitate this note. I attest to having reviewed and edited the generated note for accuracy, though some syntax or spelling errors may persist. Please contact the author of this note for any clarification.         Leta Yin NP  [facility]   1/31/2025      Answers submitted by the patient for this visit:  Ear Pain Questionnaire (Submitted on 1/28/2025)  Chief Complaint: Ear pain  Affected ear: left  Chronicity: new  Onset: today  Progression since onset: waxing and waning  Frequency: constantly  Fever: no fever  Pain - numeric: 8/10  abdominal pain: No  ear discharge: No  rash: No  cough: No  headaches: No  rhinorrhea: No  diarrhea: No  hearing loss: No  sore throat: No  neck pain: No  vomiting: No  drainage: No  Treatments tried: NSAIDs  Improvement on treatment: mild  Pain severity: moderate  chronic ear infection: No  hearing loss: Yes  tympanostomy tube: Yes

## 2025-02-02 NOTE — PROGRESS NOTES
Subjective:      Patient ID: Kalpana Rivera is a 64 y.o. female.    Chief Complaint:  No chief complaint on file.    History of Present Illness  Kalpana Rivera presents today for follow up of JULIANN now managed as type 1 diabetic-insulin dependent. Previous patient of Dr. Zeng and myself. Last seen in March 2024     Ref. Range 9/14/2020 07:45   Glucose Latest Ref Range: 65 - 99 mg/dL 154 (H)      Latest Reference Range & Units 09/14/20 07:45   C-Peptide 0.80 - 3.85 ng/mL 0.10 (L)   (L): Data is abnormally low   Latest Reference Range & Units 09/14/20 07:45   Glutamate decarboxylase 65 Ab <5 IU/mL 97 (H)   (H): Data is abnormally high    The patient location is: at home  The chief complaint leading to consultation is: diabetes     Visit type: audiovisual    Face to Face time with patient: 29  40 minutes of total time spent on the encounter, which includes face to face time and non-face to face time preparing to see the patient (eg, review of tests), Obtaining and/or reviewing separately obtained history, Documenting clinical information in the electronic or other health record, Independently interpreting results (not separately reported) and communicating results to the patient/family/caregiver, or Care coordination (not separately reported).    Each patient to whom he or she provides medical services by telemedicine is:  (1) informed of the relationship between the physician and patient and the respective role of any other health care provider with respect to management of the patient; and (2) notified that he or she may decline to receive medical services by telemedicine and may withdraw from such care at any time.    Started using Mounjaro in June 2023 when she ran out of current supply of Trulicity~ lost about 15 pounds since starting.   She is using dexcom G7 and likes it but wearing on abdomen.     She had one fall since her last visit but no fractures.     Wt Readings from Last 3 Encounters:   01/31/25  "0920 53.7 kg (118 lb 6.2 oz)   01/16/25 0905 56.3 kg (124 lb 0.1 oz)   08/29/24 1304 57.5 kg (126 lb 10.5 oz)     With regards to the diabetes:    Diagnosed with diabetes age 24; diagnosed as JULIANN in 2020  Started insulin during first pregnancy and able to come off. In 1992 had son and has been on insulin since that time  Nephew is type 1  Family History of Type 2 DM: mother and father  Known complications:  DKA/HHS: twice in the past  RN: +; July 2024  PN: +  Nephropathy: -  Gastroparesis: denies  CAD: denies    We prescribed INPEN but had issues with cartridges and expiration of INPEN so we decided to hold off at this time.     Current regimen:  Tresiba 7 units in AM and 6 units in PM  Mounjaro 5 mg weekly  Metformin 1000 mg twice daily   Novolog  - reports she has been able to skip novolog dose occ at lunch  She is taking novolog at least 2 times daily.                                  Breakfast:          2-4 Units              Lunch:               2-5 Units              Dinner:              2-5 Units    States she changes her novolog based on what she is eating at times; sometimes reduces    Has been taking novolog 10-15 minutes prior to a meal most days. Takes right before a meal if her BG is <110.  She is changing needle every time and rotating injection sites.     Missed doses-denies     Other medications tried:  Trulicity 1.5 mg weekly- very low appetite so stopped  levemir    4 times daily  See media tab.   She loves the dexcom.   Hypoglycemia: "rarely"  Dexcom reviewed: hypoglycemia around 3 am a couple of times a week; postprandial hyperglycemia especially after breakfast; GMI 7.1%  Knows how to correct with 15 grams of carbs- juice, coke, or a peppermint.     Dietary recall: She feels that she is following diabetic diet  Eats 3 meals a day.   States that she has noticed portion sizes decreased because she is not as hungry.  B: greek yogurt with splenda or protein shake -premier (7-7:30)  L: healthy " "choice or grilled cheese sandwich (12-1)  D: same as lunch (6:30-7)   She does not cook often but has some frozen foods.   She has been working from home.   Snacks: not really snacking as much anymore; pickles and cashews; carrots; not normally snacking after dinner  Drinks: water, green tea, and diet coke  Had box of raisins before     Exercise -She stopped PT but will resume again.    Education - last visit: 2/2023    Has a Medic alert tag-has bracelet   Glucagon/Baqsimi-declines; lives alone  She is not interested in pump therapy    Diabetes Management Status  Statin: Not taking  ACE/ARB: Not taking -stopped due to diarrhea    Lab Results   Component Value Date    HGBA1C 6.7 (H) 01/14/2025    HGBA1C 6.6 (H) 09/05/2024    HGBA1C 6.4 (H) 03/13/2024     Screening or Prevention Patient's value Goal Complete/Controlled?   HgA1C Testing and Control   Lab Results   Component Value Date    HGBA1C 6.7 (H) 01/14/2025      Annually/Less than 8% Yes   Lipid profile : 01/14/2025 Annually Yes   LDL control Lab Results   Component Value Date    LDLCALC 57.0 (L) 01/14/2025    Annually/Less than 100 mg/dl  Yes   Nephropathy screening Lab Results   Component Value Date    LABMICR <5.0 09/05/2024     No results found for: "PROTEINUA" Annually No   Blood pressure BP Readings from Last 1 Encounters:   01/31/25 132/68    Less than 140/90 Yes   Dilated retinal exam : 07/26/2024 Annually Yes   Foot exam   : 01/30/2023 Annually Yes     With regards to thyroid nodule,     Remembers being told in the past that she had a thyroid nodule and needed biopsy which she did not do.     Denies hoarseness or dysphagia.     No radiation to head or neck. No history of thyroid cancer in family.     Thyroid US 4/5/23  Impression:     1.  Thyroid gland is normal in size with homogenous echotexture.     2.  1 nodule in the right thyroid described above.  It does not meet criteria for fine-needle aspiration.     RECOMMENDATIONS:  Follow-up ultrasound in 2 " years is recommended.    RECOMMENDATIONS:  Repeat thyroid ultrasound in 2 years.    Lab Results   Component Value Date    TSH 0.935 09/05/2024     With regards to dyslipidemia,     Was prescribed pravastatin 10 mg daily. No longer having diarrhea as she had in the past.      Latest Reference Range & Units 09/05/24 07:37 01/14/25 07:28   Cholesterol Total 120 - 199 mg/dL 124 120   HDL 40 - 75 mg/dL 53 53   HDL/Cholesterol Ratio 20.0 - 50.0 % 42.7 44.2   Non-HDL Cholesterol mg/dL 71 67   Total Cholesterol/HDL Ratio 2.0 - 5.0  2.3 2.3   Triglycerides 30 - 150 mg/dL 53 50   LDL Cholesterol 63.0 - 159.0 mg/dL 60.4 (L) 57.0 (L)   (L): Data is abnormally low    Of note, she had elevated CO2 --declines sleep study at this time.    FIB-4 Calculation: 1.42 at 1/14/2025  7:28 AM  Calculated from:  SGOT/AST: 19 U/L at 1/14/2025  7:28 AM  SGPT/ALT: 17 U/L at 1/14/2025  7:28 AM  Platelets: 211 K/uL at 1/14/2025  7:28 AM  Age: 64 years     FIB-4 below 1.30 is considered as low-risk for advanced fibrosis  FIB-4 over 2.67 is considered as high-risk for advanced fibrosis  FIB-4 values between 1.30 and 2.67 are considered as intermediate-risk of advanced fibrosis for ages 36-64.     For ages > 64 the cut-off for low-risk goes to < 2.  This is a screening tool and clinical judgement should be used in the interpretation of these results.    Review of Systems   Constitutional:  Negative for fatigue and unexpected weight change.   Eyes:  Negative for visual disturbance.   Endocrine: Negative for polydipsia, polyphagia and polyuria.     Objective:   Physical Exam  Constitutional:       Appearance: Normal appearance.   Pulmonary:      Effort: Pulmonary effort is normal.   Neurological:      Mental Status: She is alert.     Denies injection site edema or erythema. No lipo hypertropthy or atrophy  Diabetes Foot Exam:  Feet no cuts or scratches  Shoes appropriate  DM foot exam deferred; done in Jan 2023    There were no vitals taken for this  "visit.    Wt Readings from Last 3 Encounters:   01/31/25 0920 53.7 kg (118 lb 6.2 oz)   01/16/25 0905 56.3 kg (124 lb 0.1 oz)   08/29/24 1304 57.5 kg (126 lb 10.5 oz)     Lab Review:   Lab Results   Component Value Date    HGBA1C 6.7 (H) 01/14/2025    HGBA1C 6.6 (H) 09/05/2024    HGBA1C 6.4 (H) 03/13/2024      Lab Results   Component Value Date    CHOL 120 01/14/2025    HDL 53 01/14/2025    LDLCALC 57.0 (L) 01/14/2025    TRIG 50 01/14/2025    CHOLHDL 44.2 01/14/2025     Lab Results   Component Value Date     01/14/2025    K 4.1 01/14/2025     01/14/2025    CO2 29 01/14/2025     (H) 01/14/2025    BUN 8 01/14/2025    CREATININE 0.6 01/14/2025    CALCIUM 9.1 01/14/2025    PROT 6.6 01/14/2025    ALBUMIN 3.8 01/14/2025    BILITOT 0.4 01/14/2025    ALKPHOS 54 01/14/2025    AST 19 01/14/2025    ALT 17 01/14/2025    ANIONGAP 7 (L) 01/14/2025    ESTGFRAFRICA >60.0 03/28/2022    EGFRNONAA >60.0 03/28/2022    TSH 0.935 09/05/2024     No results found for: "[id number]"  Assessment and Plan     1. 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 Type 2 diabetes mellitus with both eyes affected by moderate nonproliferative retinopathy without macular edema, with long-term current use of insulin  Comprehensive Metabolic Panel    Hemoglobin A1C      3. Thyroid nodule  US Soft Tissue Head Neck      4. 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 She is on small doses of insulin. " Suspect she is still making some insulin.    A1c at goal   She is not interested in pump therapy    Dexcom reviewed: hypoglycemia around 3 am a couple of times a week; postprandial hyperglycemia especially after breakfast; GMI 7.1%  Continue  Metformin 1000 mg twice daily   Continue Mounjaro 5 mg weekly  Decrease Tresiba to 6 units in AM and 5 units in PM -12 hours apart    Continue  Novolog                Breakfast:          2-4 Units              Lunch:               2-5 Units              Dinner:              2-5 Units      -- Please try to give insulin at least 10 minutes before a meal.   -- Reviewed patient's current insulin regimen. Clarified proper insulin dose and timing in relation to meals, etc. Insulin injection sites and proper rotation instructed.    -- Advised frequent self blood glucose monitoring.  Patient encouraged to document glucose results and bring them to every clinic visit    -- Hypoglycemia precautions discussed. Instructed on precautions before driving.    -- Call for Bg repeatedly < 90 or > 180.   -- Close adherence to lifestyle changes recommended.   -- Periodic follow ups for eye evaluations, foot care and dental care suggested.  -- Given information on diabetic snacks and hypoglycemia previously  Cholesterol at goal without medications    Thyroid nodule  -- Small stable nodule on US in 2022  -- Denies compressive symptoms  -- Repeat thyroid US in 2025    Postmenopausal  Check bone density    Continue pravastatin 10 mg a couple of days a week  LDL at goal    Visit today included increased complexity associated with the care of episodic problems Type 2 diabetes, thyroid nodule, postmenopausal female addressed and managing longitudinal care of the patient due to serious managed problems Type 2 diabetes, thyroid nodule, postmenopausal female     Follow up in about 4 months (around 6/3/2025).  Labs prior to visit

## 2025-02-03 ENCOUNTER — OFFICE VISIT (OUTPATIENT)
Dept: ENDOCRINOLOGY | Facility: CLINIC | Age: 65
End: 2025-02-03
Payer: COMMERCIAL

## 2025-02-03 DIAGNOSIS — E10.3393 TYPE 1 DIABETES MELLITUS WITH MODERATE NONPROLIFERATIVE RETINOPATHY OF BOTH EYES WITHOUT MACULAR EDEMA: Primary | ICD-10-CM

## 2025-02-03 DIAGNOSIS — E11.3393 TYPE 2 DIABETES MELLITUS WITH BOTH EYES AFFECTED BY MODERATE NONPROLIFERATIVE RETINOPATHY WITHOUT MACULAR EDEMA, WITH LONG-TERM CURRENT USE OF INSULIN: ICD-10-CM

## 2025-02-03 DIAGNOSIS — Z78.0 POSTMENOPAUSAL: ICD-10-CM

## 2025-02-03 DIAGNOSIS — Z79.4 TYPE 2 DIABETES MELLITUS WITH BOTH EYES AFFECTED BY MODERATE NONPROLIFERATIVE RETINOPATHY WITHOUT MACULAR EDEMA, WITH LONG-TERM CURRENT USE OF INSULIN: ICD-10-CM

## 2025-02-03 DIAGNOSIS — E04.1 THYROID NODULE: ICD-10-CM

## 2025-02-03 PROCEDURE — 1159F MED LIST DOCD IN RCRD: CPT | Mod: CPTII,95,, | Performed by: NURSE PRACTITIONER

## 2025-02-03 PROCEDURE — 3044F HG A1C LEVEL LT 7.0%: CPT | Mod: CPTII,95,, | Performed by: NURSE PRACTITIONER

## 2025-02-03 PROCEDURE — 1160F RVW MEDS BY RX/DR IN RCRD: CPT | Mod: CPTII,95,, | Performed by: NURSE PRACTITIONER

## 2025-02-03 PROCEDURE — 98007 SYNCH AUDIO-VIDEO EST HI 40: CPT | Mod: 95,,, | Performed by: NURSE PRACTITIONER

## 2025-02-03 PROCEDURE — G2211 COMPLEX E/M VISIT ADD ON: HCPCS | Mod: 95,,, | Performed by: NURSE PRACTITIONER

## 2025-02-03 NOTE — ASSESSMENT & PLAN NOTE
-- Reviewed goals of therapy are to get the best control we can without hypoglycemia  -- Treat as type 1.5 diabetic   She is not near body weight dose of insulin. She is on small doses of insulin. Suspect she is still making some insulin.    A1c at goal   She is not interested in pump therapy    Dexcom reviewed: hypoglycemia around 3 am a couple of times a week; postprandial hyperglycemia especially after breakfast; GMI 7.1%  Continue  Metformin 1000 mg twice daily   Continue Mounjaro 5 mg weekly  Decrease Tresiba to 6 units in AM and 5 units in PM -12 hours apart    Continue  Novolog                Breakfast:          2-4 Units              Lunch:               2-5 Units              Dinner:              2-5 Units      -- Please try to give insulin at least 10 minutes before a meal.   -- Reviewed patient's current insulin regimen. Clarified proper insulin dose and timing in relation to meals, etc. Insulin injection sites and proper rotation instructed.    -- Advised frequent self blood glucose monitoring.  Patient encouraged to document glucose results and bring them to every clinic visit    -- Hypoglycemia precautions discussed. Instructed on precautions before driving.    -- Call for Bg repeatedly < 90 or > 180.   -- Close adherence to lifestyle changes recommended.   -- Periodic follow ups for eye evaluations, foot care and dental care suggested.  -- Given information on diabetic snacks and hypoglycemia previously  Cholesterol at goal without medications

## 2025-02-03 NOTE — PATIENT INSTRUCTIONS
Thyroid Nodule               Check thyroid US in 4/2025     Cholesterol              Continue pravastatin 10 mg a couple of days a week              LDL at goal     Diabetes  A1c at goal   She is not interested in pump therapy    Dexcom reviewed: hypoglycemia around 3 am a couple of times a week; postprandial hyperglycemia especially after breakfast; GMI 7.1%  Continue  Metformin 1000 mg twice daily   Continue Mounjaro 5 mg weekly  Decrease Tresiba to 6 units in AM and 5 units in PM -12 hours apart    Continue  Novolog                Breakfast:          2-4 Units              Lunch:               2-5 Units              Dinner:              2-5 Units     Check bone density

## 2025-02-05 ENCOUNTER — TELEPHONE (OUTPATIENT)
Dept: ENDOCRINOLOGY | Facility: CLINIC | Age: 65
End: 2025-02-05
Payer: COMMERCIAL

## 2025-02-05 NOTE — TELEPHONE ENCOUNTER
Thyroid US in April 2025   DXA April 2025?   Labs on RTC       Reached out to the patient about getting scheduled bone density and ultrasound.

## 2025-03-06 DIAGNOSIS — E10.9 TYPE 1 DIABETES MELLITUS WITHOUT COMPLICATION: ICD-10-CM

## 2025-03-07 NOTE — TELEPHONE ENCOUNTER
Returned patient call back.    Thyroid US in April 2025   DXA April 2025?   Labs on RTC       Called to get her scheduled.

## 2025-03-10 RX ORDER — PEN NEEDLE, DIABETIC 30 GX3/16"
NEEDLE, DISPOSABLE MISCELLANEOUS
Qty: 200 EACH | Refills: 1 | Status: SHIPPED | OUTPATIENT
Start: 2025-03-10

## 2025-03-12 ENCOUNTER — TELEPHONE (OUTPATIENT)
Dept: ENDOCRINOLOGY | Facility: CLINIC | Age: 65
End: 2025-03-12
Payer: COMMERCIAL

## 2025-04-09 ENCOUNTER — HOSPITAL ENCOUNTER (OUTPATIENT)
Dept: ENDOCRINOLOGY | Facility: CLINIC | Age: 65
Discharge: HOME | End: 2025-04-09
Attending: NURSE PRACTITIONER
Payer: COMMERCIAL

## 2025-04-09 ENCOUNTER — HOSPITAL ENCOUNTER (OUTPATIENT)
Dept: RADIOLOGY | Facility: CLINIC | Age: 65
Discharge: HOME | End: 2025-04-09
Attending: NURSE PRACTITIONER
Payer: COMMERCIAL

## 2025-04-09 DIAGNOSIS — Z78.0 POSTMENOPAUSAL: ICD-10-CM

## 2025-04-09 DIAGNOSIS — E04.1 THYROID NODULE: ICD-10-CM

## 2025-04-09 PROCEDURE — 77080 DXA BONE DENSITY AXIAL: CPT | Mod: 26,,, | Performed by: INTERNAL MEDICINE

## 2025-04-09 PROCEDURE — 77092 TBS I&R FX RSK QHP: CPT | Mod: S$GLB,,, | Performed by: INTERNAL MEDICINE

## 2025-04-09 PROCEDURE — 77080 DXA BONE DENSITY AXIAL: CPT | Mod: TC

## 2025-04-09 PROCEDURE — 76536 US EXAM OF HEAD AND NECK: CPT | Mod: S$GLB,,, | Performed by: INTERNAL MEDICINE

## 2025-04-10 ENCOUNTER — RESULTS FOLLOW-UP (OUTPATIENT)
Dept: ENDOCRINOLOGY | Facility: CLINIC | Age: 65
End: 2025-04-10

## 2025-04-14 PROBLEM — M81.0 AGE-RELATED OSTEOPOROSIS WITHOUT CURRENT PATHOLOGICAL FRACTURE: Status: ACTIVE | Noted: 2025-04-14

## 2025-04-24 ENCOUNTER — PATIENT MESSAGE (OUTPATIENT)
Dept: ADMINISTRATIVE | Facility: HOSPITAL | Age: 65
End: 2025-04-24
Payer: COMMERCIAL

## 2025-04-28 NOTE — PROGRESS NOTES
Subjective:      Patient ID: Kalpana Rivera is a 64 y.o. female.    Chief Complaint:  No chief complaint on file.    History of Present Illness  Kalpana Rivera presents today for follow up of JULIANN now managed as type 1 diabetic-insulin dependent. Previous patient of Dr. Zeng and myself. Last seen in March 2024     Ref. Range 9/14/2020 07:45   Glucose Latest Ref Range: 65 - 99 mg/dL 154 (H)      Latest Reference Range & Units 09/14/20 07:45   C-Peptide 0.80 - 3.85 ng/mL 0.10 (L)   (L): Data is abnormally low   Latest Reference Range & Units 09/14/20 07:45   Glutamate decarboxylase 65 Ab <5 IU/mL 97 (H)   (H): Data is abnormally high    The patient location is: at home  The chief complaint leading to consultation is: diabetes     Visit type: audiovisual    Face to Face time with patient: 20  40 minutes of total time spent on the encounter, which includes face to face time and non-face to face time preparing to see the patient (eg, review of tests), Obtaining and/or reviewing separately obtained history, Documenting clinical information in the electronic or other health record, Independently interpreting results (not separately reported) and communicating results to the patient/family/caregiver, or Care coordination (not separately reported).    Each patient to whom he or she provides medical services by telemedicine is:  (1) informed of the relationship between the physician and patient and the respective role of any other health care provider with respect to management of the patient; and (2) notified that he or she may decline to receive medical services by telemedicine and may withdraw from such care at any time.    Denies falls since her last visit   She is here today for interim visit to discuss osteoporosis     Wt Readings from Last 3 Encounters:   01/31/25 0920 53.7 kg (118 lb 6.2 oz)   01/16/25 0905 56.3 kg (124 lb 0.1 oz)   08/29/24 1304 57.5 kg (126 lb 10.5 oz)     With regards to osteoporosis:      Family history: none    Menopause at age -unknown but thinks early 50's and was not on HRT     current medication: none     Calcium intake-MVI and has yogurt daily   Vit D intake- MVI    Weight bearing exercise- denies   Recent falls- none     They have made fall precautions in house   Dental work- needs to make appt with dentist for bone graft     Fracture History:   Around 2022 - Slip and fall and fractured right forearm -no cast and was healed in 2 weeks   No significant height loss (>2 inches)    tob use -denies   etoh use-denies   Hobbies/Habits: denies      No current diarrhea or h/o malabsorption    Denies chronic exposure to steroid therapy, anticoagulants, proton pump inhibitors or antiseizure medications.     Denies GERD, indigestion,  gastric bypass surgery.     Denies history of thyroid disease, anemia, kidney stones or kidney disease.     Denies active malignancy, history of malignancy the involved the bone, prior radiation treatment, or unexplained elevations of alk phos on labs     Denies history of heart attack and stroke    Last BMD dated  4/9/2025.    COMPARISON:  No prior comparison available     FINDINGS:  Lumbar spine (L1-L4):               T-score is -2.4, and Z-score is -0.6.     Femoral neck:                          T-score is -2.5, and Z-score is -1.0.     Total hip:                                T-score is -1.8, and Z-score is -0.6.     Distal 1/3 radius:                      Not applicable        Trabecular Bone Score     The trabecular bone score (TBS) indicates partially degraded bone quality.  Fracture Risk (FRAX adjusted for Trabecular Bone Score)     20% risk of a major osteoporotic fracture in the next 10 years.     4.3% risk of hip fracture in the next 10 years.     Impression:     *Osteoporosis based on T-score below -2.5  *Fracture risk is high.     RECOMMENDATIONS:  *Daily calcium intake 0356-1232 mg, dietary sources preferred; Vitamin D 8059-8111 IU daily.  *Weight bearing  "exercise and fall precautions.  *Recommend medical therapy for osteoporosis with high risk for fracture. Consider bisphosphonates (alendronate, risedronate, zoledronic acid), or denosumab.  *Repeat BMD in 2 years      Did not discuss below     With regards to the diabetes:    Diagnosed with diabetes age 24; diagnosed as JULIANN in 2020  Started insulin during first pregnancy and able to come off. In 1992 had son and has been on insulin since that time  Nephew is type 1  Family History of Type 2 DM: mother and father  Known complications:  DKA/HHS: twice in the past  RN: +; July 2024  PN: +  Nephropathy: -  Gastroparesis: denies  CAD: denies    We prescribed INPEN but had issues with cartridges and expiration of INPEN so we decided to hold off at this time.     Current regimen:  Tresiba 7 units in AM and 6 units in PM  Mounjaro 5 mg weekly  Metformin 1000 mg twice daily   Novolog  - reports she has been able to skip novolog dose occ at lunch  She is taking novolog at least 2 times daily.                                  Breakfast:          2-4 Units              Lunch:               2-5 Units              Dinner:              2-5 Units    States she changes her novolog based on what she is eating at times; sometimes reduces    Has been taking novolog 10-15 minutes prior to a meal most days. Takes right before a meal if her BG is <110.  She is changing needle every time and rotating injection sites.     Missed doses-denies     Other medications tried:  Trulicity 1.5 mg weekly- very low appetite so stopped  levemir    4 times daily  See media tab.   She loves the dexcom.   Hypoglycemia: "rarely"  Dexcom reviewed: hypoglycemia around 3 am a couple of times a week; postprandial hyperglycemia especially after breakfast; GMI 7.1%  Knows how to correct with 15 grams of carbs- juice, coke, or a peppermint.     Dietary recall: She feels that she is following diabetic diet  Eats 3 meals a day.   States that she has noticed " "portion sizes decreased because she is not as hungry.  B: greek yogurt with splenda or protein shake -premier (7-7:30)  L: healthy choice or grilled cheese sandwich (12-1)  D: same as lunch (6:30-7)   She does not cook often but has some frozen foods.   She has been working from home.   Snacks: not really snacking as much anymore; pickles and cashews; carrots; not normally snacking after dinner  Drinks: water, green tea, and diet coke  Had box of raisins before     Exercise -She stopped PT but will resume again.    Education - last visit: 2/2023    Has a Medic alert tag-has bracelet   Glucagon/Baqsimi-declines; lives alone  She is not interested in pump therapy    Diabetes Management Status  Statin: Not taking  ACE/ARB: Not taking -stopped due to diarrhea    Lab Results   Component Value Date    HGBA1C 6.7 (H) 01/14/2025    HGBA1C 6.6 (H) 09/05/2024    HGBA1C 6.4 (H) 03/13/2024     Screening or Prevention Patient's value Goal Complete/Controlled?   HgA1C Testing and Control   Lab Results   Component Value Date    HGBA1C 6.7 (H) 01/14/2025      Annually/Less than 8% Yes   Lipid profile : 01/14/2025 Annually Yes   LDL control Lab Results   Component Value Date    LDLCALC 57.0 (L) 01/14/2025    Annually/Less than 100 mg/dl  Yes   Nephropathy screening Lab Results   Component Value Date    LABMICR <5.0 09/05/2024     No results found for: "PROTEINUA" Annually No   Blood pressure BP Readings from Last 1 Encounters:   01/31/25 132/68    Less than 140/90 Yes   Dilated retinal exam : 07/26/2024 Annually Yes   Foot exam   : 01/30/2023 Annually Yes     With regards to thyroid nodule,     Remembers being told in the past that she had a thyroid nodule and needed biopsy which she did not do.     Denies hoarseness or dysphagia.     No radiation to head or neck. No history of thyroid cancer in family.     Thyroid US 4/5/23  Impression:     1.  Thyroid gland is normal in size with homogenous echotexture.     2.  1 nodule in the " right thyroid described above.  It does not meet criteria for fine-needle aspiration.     RECOMMENDATIONS:  Follow-up ultrasound in 2 years is recommended.    RECOMMENDATIONS:  Repeat thyroid ultrasound in 2 years.    Lab Results   Component Value Date    TSH 0.935 09/05/2024     With regards to dyslipidemia,     Was prescribed pravastatin 10 mg daily. No longer having diarrhea as she had in the past.      Latest Reference Range & Units 09/05/24 07:37 01/14/25 07:28   Cholesterol Total 120 - 199 mg/dL 124 120   HDL 40 - 75 mg/dL 53 53   HDL/Cholesterol Ratio 20.0 - 50.0 % 42.7 44.2   Non-HDL Cholesterol mg/dL 71 67   Total Cholesterol/HDL Ratio 2.0 - 5.0  2.3 2.3   Triglycerides 30 - 150 mg/dL 53 50   LDL Cholesterol 63.0 - 159.0 mg/dL 60.4 (L) 57.0 (L)   (L): Data is abnormally low    Of note, she had elevated CO2 --declines sleep study at this time.    FIB-4 Calculation: 1.42 at 1/14/2025  7:28 AM  Calculated from:  SGOT/AST: 19 U/L at 1/14/2025  7:28 AM  SGPT/ALT: 17 U/L at 1/14/2025  7:28 AM  Platelets: 211 K/uL at 1/14/2025  7:28 AM  Age: 64 years     FIB-4 below 1.30 is considered as low-risk for advanced fibrosis  FIB-4 over 2.67 is considered as high-risk for advanced fibrosis  FIB-4 values between 1.30 and 2.67 are considered as intermediate-risk of advanced fibrosis for ages 36-64.     For ages > 64 the cut-off for low-risk goes to < 2.  This is a screening tool and clinical judgement should be used in the interpretation of these results.    Review of Systems   Constitutional:  Negative for fatigue and unexpected weight change.   Eyes:  Negative for visual disturbance.   Endocrine: Negative for polydipsia, polyphagia and polyuria.     Objective:   Physical Exam  Constitutional:       Appearance: Normal appearance.   Pulmonary:      Effort: Pulmonary effort is normal.   Neurological:      Mental Status: She is alert.         There were no vitals taken for this visit.    Wt Readings from Last 3 Encounters:  "  01/31/25 0920 53.7 kg (118 lb 6.2 oz)   01/16/25 0905 56.3 kg (124 lb 0.1 oz)   08/29/24 1304 57.5 kg (126 lb 10.5 oz)     Lab Review:   Lab Results   Component Value Date    HGBA1C 6.7 (H) 01/14/2025    HGBA1C 6.6 (H) 09/05/2024    HGBA1C 6.4 (H) 03/13/2024      Lab Results   Component Value Date    CHOL 120 01/14/2025    HDL 53 01/14/2025    LDLCALC 57.0 (L) 01/14/2025    TRIG 50 01/14/2025    CHOLHDL 44.2 01/14/2025     Lab Results   Component Value Date     01/14/2025    K 4.1 01/14/2025     01/14/2025    CO2 29 01/14/2025     (H) 01/14/2025    BUN 8 01/14/2025    CREATININE 0.6 01/14/2025    CALCIUM 9.1 01/14/2025    PROT 6.6 01/14/2025    ALBUMIN 3.8 01/14/2025    BILITOT 0.4 01/14/2025    ALKPHOS 54 01/14/2025    AST 19 01/14/2025    ALT 17 01/14/2025    ANIONGAP 7 (L) 01/14/2025    ESTGFRAFRICA >60.0 03/28/2022    EGFRNONAA >60.0 03/28/2022    TSH 0.935 09/05/2024     No results found for: "[id number]"  Assessment and Plan     1. Age-related osteoporosis without current pathological fracture  C Telopeptide (CTX), Serum    Protein Electrophoresis, Serum    PTH, Intact    Renal Function Panel    Tissue Transglutaminase, IgA    Vitamin D      2. Type 1 diabetes mellitus without complication  metFORMIN (GLUCOPHAGE-XR) 500 MG ER 24hr tablet    Measles Antibodies (IgG, IgM)      3. Dyslipidemia  pravastatin (PRAVACHOL) 10 MG tablet          Age-related osteoporosis without current pathological fracture  -- Risks include low weight,  race, menopause  -- Reviewed basics of quantity versus quality  -- Reassuring she is not fracturing     Check labs for secondary causes of osteoporosis   She needs dental work -bone graft. Will avoid for 6 months after dental work. She would be great candidate for Fosamax.     -- RDA of calcium (6567-5493 mg /day in divided doses) and vitamin D 2000iu a day  discussed  -- Calcium from food sources preferred. Given list of calcium in foods.  -- Fall " precautions emphasized  -- +weight bearing exercise  -- NOF.org website information given  -- Pharmacological therapy is recommended for patients with osteopenia if the 10 year probability of a hip fracture is >3% and 10 year probability of other major osteoporotic fractures is >20%.   -- Treatment options and potential side effects discussed for PO bisphosphonates, reclast, Denosumab, and Teriparatide.  -- Low risk for ONJ and atypical fractures reviewed and discussed. Alerted that if dental work needs to be done it should be done prior to initiating therapy   -- Discussed optimal duration of bisphosphonate use is unknown - drug holiday after 5-10 po versus drug hold ay after 3 reclast treatment   -- Repeat DEXA scan in 2 yrs time.     She agrees to look at AVS    Visit today included increased complexity associated with the care of episodic problems Type 2 diabetes, thyroid nodule, postmenopausal female addressed and managing longitudinal care of the patient due to serious managed problems Type 2 diabetes, thyroid nodule, postmenopausal female     No follow-ups on file.  Labs prior to visit      Goal: Improve to I without bed rails by 4 weeks.

## 2025-04-29 NOTE — ASSESSMENT & PLAN NOTE
-- Risks include low weight,  race, menopause  -- Reviewed basics of quantity versus quality  -- Reassuring she is not fracturing     Check labs for secondary causes of osteoporosis   She needs dental work -bone graft. Will avoid for 6 months after dental work. She would be great candidate for Fosamax.     -- RDA of calcium (7365-8098 mg /day in divided doses) and vitamin D 2000iu a day  discussed  -- Calcium from food sources preferred. Given list of calcium in foods.  -- Fall precautions emphasized  -- +weight bearing exercise  -- NOF[employer] website information given  -- Pharmacological therapy is recommended for patients with osteopenia if the 10 year probability of a hip fracture is >3% and 10 year probability of other major osteoporotic fractures is >20%.   -- Treatment options and potential side effects discussed for PO bisphosphonates, reclast, Denosumab, and Teriparatide.  -- Low risk for ONJ and atypical fractures reviewed and discussed. Alerted that if dental work needs to be done it should be done prior to initiating therapy   -- Discussed optimal duration of bisphosphonate use is unknown - drug holiday after 5-10 po versus drug hold ay after 3 reclast treatment   -- Repeat DEXA scan in 2 yrs time.

## 2025-04-30 ENCOUNTER — OFFICE VISIT (OUTPATIENT)
Dept: ENDOCRINOLOGY | Facility: CLINIC | Age: 65
End: 2025-04-30
Payer: COMMERCIAL

## 2025-04-30 DIAGNOSIS — E10.9 TYPE 1 DIABETES MELLITUS WITHOUT COMPLICATION: ICD-10-CM

## 2025-04-30 DIAGNOSIS — E78.5 DYSLIPIDEMIA: ICD-10-CM

## 2025-04-30 DIAGNOSIS — M81.0 AGE-RELATED OSTEOPOROSIS WITHOUT CURRENT PATHOLOGICAL FRACTURE: Primary | ICD-10-CM

## 2025-04-30 PROCEDURE — 99213 OFFICE O/P EST LOW 20 MIN: CPT | Performed by: NURSE PRACTITIONER

## 2025-04-30 RX ORDER — METFORMIN HYDROCHLORIDE 500 MG/1
1000 TABLET, EXTENDED RELEASE ORAL 2 TIMES DAILY WITH MEALS
Qty: 360 TABLET | Refills: 3 | Status: SHIPPED | OUTPATIENT
Start: 2025-04-30 | End: 2025-05-01

## 2025-04-30 RX ORDER — PRAVASTATIN SODIUM 10 MG/1
10 TABLET ORAL NIGHTLY
Qty: 90 TABLET | Refills: 3 | Status: SHIPPED | OUTPATIENT
Start: 2025-04-30 | End: 2026-04-30

## 2025-04-30 NOTE — PATIENT INSTRUCTIONS
Check labs for secondary causes of osteoporosis   She needs dental work -bone graft. Will avoid for 6 months after dental work.      Tscore  -1.0 osteopenia  -2.5 osteoporosis  -3.0 is severe osteoporosis     FRAX score   >20% major bone in body   >3% hip    Recommended daily allowance of calcium is 3063-3008 mg daily, preferably from food. See below  Recommended daily allowance of vitamin D is 8887-0329 IU daily    Encouraged weight bearing exercise  Encouraged to avoid falls  Avoid alcohol and tobacco    Estimated Calcium Content of Foods:  Produce  Serving Size Estimated Calcium*    Lisbeth greens, frozen 8 oz 360 mg   Broccoli osmin 8 oz 200 mg   Kale, frozen 8 oz 180 mg   Soy Beans, green, boiled 8 oz 175 mg   Bok Dasha, cooked, boiled 8 oz 160 mg   Figs, dried 2 figs 65 mg   Broccoli, fresh, cooked 8 oz 60 mg   Oranges 1 whole 55 mg   Seafood Serving Size Estimated Calcium*    Sardines, canned with bones 3 oz 325 mg   Anabel, canned with bones 3 oz 180 mg   Shrimp, canned 3 oz 125 mg   Dairy Serving Size Estimated Calcium*    Ricotta, part-skim 4 oz 335 mg   Yogurt, plain, low-fat 6 oz 310 mg   Milk, skim, low-fat, whole 8 oz 300 mg   Yogurt with fruit, low-fat 6 oz 260 mg   Mozzarella, part-skim 1 oz 210 mg   Cheddar 1 oz 205 mg   Yogurt, Greek 6 oz 200 mg   American Cheese 1 oz 195 mg   Feta Cheese 4 oz 140 mg   Cottage Cheese, 2% 4 oz 105 mg   Frozen yogurt, vanilla 8 oz 105 mg   Ice Cream, vanilla 8 oz 85 mg   Parmesan 1 tbsp 55 mg   Fortified Food Serving Size Estimated Calcium*   [place] milk, rice milk or soy milk, fortified 8 oz 300 mg   Orange juice and other fruit juices, fortified 8 oz 300 mg   Tofu, prepared with calcium 4 oz 205 mg   Waffle, frozen, fortified 2 pieces 200 mg   Oatmeal, fortified 1 packet 140 mg   English muffin, fortified 1 muffin 100 mg   Cereal, fortified 8 oz 100-1,000 mg   Other Serving Size Estimated Calcium*   Mac & cheese, frozen 1 package 325 mg   Pizza, cheese, frozen 1  serving 115 mg   Pudding, chocolate, prepared with 2% milk 4 oz 160 mg   Beans, baked, canned 4 oz 160 mg   *The calcium content listed for most foods is estimated and can vary due to multiple factors. Check the food label to determine how much calcium is in a particular product.  If you read the nutrition label for a food source, it lists the % calcium in that food.  For an 8 oz glass of milk, for example, the label states calcium 30%.  This is equivalent to 300 mg of calcium (multiply the listed number by 10).   **Table from the National Osteoporosis Foundation    Osteoporosis medications  These are the medications we discussed for osteoporosis treatment:    - Bisphosphonates:         - these slow down bone loss         - oral forms (Fosamax and Actonel are examples) - taken once weekly or once monthly. SE: esophageal burning   Take typically for 5-10 years          - IV form (Reclast) - given intravenously once yearly for 3 years typically     SE: bone aches and joint pains and flu like symptoms for a couple of days     Please call [phone] to schedule infusion     Reclast: There are a few things I ask my patients to do before receiving reclast infusion.   1) The day before and day of the infusion please drink plenty of fluids.   2) The day of the infusion take over the counter tylenol one dose every six to eight hours for one day. This minimizes the joint pains that can occur with reclast.  3) Take an extra dose of over the counter vitamin D the day before the infusion.    - Prolia (denosumab):          - slows down bone loss           - it is a subcutaneous injection (under the skin) every 6 months, given in the office    Please call [phone] to schedule infusion     Side effects of Prolia reviewed, including risk of hypocalcemia, eczema/skin complications and possible increased risk of infections.  Also reviewed association with ONJ and rare atypical femur fractures.  Advise to see the dentist regularly,  "notify dentist of using this medication and avoid non-emergent dental implants and extractions.  Also advise to contact us if develops new, persistent thigh or groin pain.    Discussed ongoing concern and accumulating data describing rebound-associated vertebral fractures (RAVFs) after denosumab discontinuation.  We now know that discontinuation of denosumab is associated with up to 50% reduction in BMD that is only partially blocked by Reclast.  Current recommendations are to either continue indefinitely or to switch to oral bisphosphonate for at least a year.    ANABOLIC's BELOW: BUILD UP BONE    - Forteo (teriparatide) or Tymlos (abaloparatide):           - medications that "build bone" or increases bone formation           - subcutaneous injection (under the skin) taken once daily that you self-administer at home for 18-24 months    SE: headaches; dizziness; bone pains  TAKE AT NIGHT    -Evenity (romosozumab)   -medications that "build bone" or increases bone formation   - subcutaneous injection (under the skin) taken once monthly for one year    Evenity (210mg) once a month subcutaneous injection used for one year. Blocks the effects of sclerostin, a protein that prevents bone production and causes bone breakdown. Dual action of bone building and antiresorptive.  Trials did show there was an increased risk of cardiovascular disease (CI in patients with history of MI or stroke).  Increased risk is 50% from baseline and 1.5% relative risk.  Rare, but a possibility.   Most common side effects are joint pain and headaches.  Use it for one year followed by antiresorptive - Prolia or bisphosphonate. Use prolia.     Receive injection in infusion center.     For more information on medications: https://[email or web address].org/patients/treatment/[email or web address]    "

## 2025-05-01 DIAGNOSIS — E10.9 TYPE 1 DIABETES MELLITUS WITHOUT COMPLICATION: ICD-10-CM

## 2025-05-01 RX ORDER — METFORMIN HYDROCHLORIDE 500 MG/1
TABLET, EXTENDED RELEASE ORAL
Qty: 360 TABLET | Refills: 1 | Status: SHIPPED | OUTPATIENT
Start: 2025-05-01

## 2025-05-02 ENCOUNTER — TELEPHONE (OUTPATIENT)
Dept: ENDOCRINOLOGY | Facility: CLINIC | Age: 65
End: 2025-05-02
Payer: COMMERCIAL

## 2025-05-15 ENCOUNTER — PATIENT MESSAGE (OUTPATIENT)
Dept: ENDOCRINOLOGY | Facility: CLINIC | Age: 65
End: 2025-05-15
Payer: COMMERCIAL

## 2025-05-15 DIAGNOSIS — E10.3393 TYPE 1 DIABETES MELLITUS WITH MODERATE NONPROLIFERATIVE RETINOPATHY OF BOTH EYES WITHOUT MACULAR EDEMA: ICD-10-CM

## 2025-05-15 DIAGNOSIS — Z79.4 TYPE 2 DIABETES MELLITUS WITH BOTH EYES AFFECTED BY MODERATE NONPROLIFERATIVE RETINOPATHY WITHOUT MACULAR EDEMA, WITH LONG-TERM CURRENT USE OF INSULIN: ICD-10-CM

## 2025-05-15 DIAGNOSIS — E66.01 CLASS 2 SEVERE OBESITY WITH SERIOUS COMORBIDITY AND BODY MASS INDEX (BMI) OF 35.0 TO 35.9 IN ADULT, UNSPECIFIED OBESITY TYPE: ICD-10-CM

## 2025-05-15 DIAGNOSIS — E66.812 CLASS 2 SEVERE OBESITY WITH SERIOUS COMORBIDITY AND BODY MASS INDEX (BMI) OF 35.0 TO 35.9 IN ADULT, UNSPECIFIED OBESITY TYPE: ICD-10-CM

## 2025-05-15 DIAGNOSIS — E11.3393 TYPE 2 DIABETES MELLITUS WITH BOTH EYES AFFECTED BY MODERATE NONPROLIFERATIVE RETINOPATHY WITHOUT MACULAR EDEMA, WITH LONG-TERM CURRENT USE OF INSULIN: ICD-10-CM

## 2025-05-15 RX ORDER — BLOOD-GLUCOSE SENSOR
EACH MISCELLANEOUS
Qty: 3 EACH | Refills: 11 | Status: SHIPPED | OUTPATIENT
Start: 2025-05-15

## 2025-05-15 RX ORDER — TIRZEPATIDE 5 MG/.5ML
5 INJECTION, SOLUTION SUBCUTANEOUS
Qty: 4 PEN | Refills: 6 | Status: SHIPPED | OUTPATIENT
Start: 2025-05-15

## 2025-05-16 ENCOUNTER — PATIENT MESSAGE (OUTPATIENT)
Dept: ENDOCRINOLOGY | Facility: CLINIC | Age: 65
End: 2025-05-16
Payer: COMMERCIAL

## 2025-05-19 ENCOUNTER — RESULTS FOLLOW-UP (OUTPATIENT)
Dept: ENDOCRINOLOGY | Facility: CLINIC | Age: 65
End: 2025-05-19

## 2025-06-02 ENCOUNTER — RESULTS FOLLOW-UP (OUTPATIENT)
Dept: FAMILY MEDICINE | Facility: CLINIC | Age: 65
End: 2025-06-02

## 2025-06-17 DIAGNOSIS — E10.9 TYPE 1 DIABETES MELLITUS WITHOUT COMPLICATION: ICD-10-CM

## 2025-06-18 RX ORDER — PEN NEEDLE, DIABETIC 30 GX3/16"
NEEDLE, DISPOSABLE MISCELLANEOUS
Qty: 400 EACH | Refills: 6 | Status: SHIPPED | OUTPATIENT
Start: 2025-06-18

## 2025-08-04 DIAGNOSIS — E10.3393 TYPE 1 DIABETES MELLITUS WITH MODERATE NONPROLIFERATIVE RETINOPATHY OF BOTH EYES WITHOUT MACULAR EDEMA: ICD-10-CM

## 2025-08-04 RX ORDER — BLOOD-GLUCOSE SENSOR
1 EACH MISCELLANEOUS
Qty: 3 EACH | Refills: 11 | Status: SHIPPED | OUTPATIENT
Start: 2025-08-04

## 2025-08-09 ENCOUNTER — PATIENT MESSAGE (OUTPATIENT)
Dept: ENDOCRINOLOGY | Facility: CLINIC | Age: 65
End: 2025-08-09
Payer: COMMERCIAL

## 2025-08-11 DIAGNOSIS — E10.9 TYPE 1 DIABETES MELLITUS WITHOUT COMPLICATION: ICD-10-CM

## 2025-08-11 RX ORDER — INSULIN DEGLUDEC 100 U/ML
6 INJECTION, SOLUTION SUBCUTANEOUS 2 TIMES DAILY
Qty: 3.6 ML | Refills: 11 | Status: SHIPPED | OUTPATIENT
Start: 2025-08-11 | End: 2025-08-13 | Stop reason: SDUPTHER

## 2025-08-11 RX ORDER — INSULIN ASPART 100 [IU]/ML
INJECTION, SOLUTION INTRAVENOUS; SUBCUTANEOUS
Qty: 45 ML | Refills: 1 | Status: SHIPPED | OUTPATIENT
Start: 2025-08-11 | End: 2025-08-13 | Stop reason: SDUPTHER

## 2025-08-13 ENCOUNTER — OFFICE VISIT (OUTPATIENT)
Dept: ENDOCRINOLOGY | Facility: CLINIC | Age: 65
End: 2025-08-13
Payer: COMMERCIAL

## 2025-08-13 VITALS
DIASTOLIC BLOOD PRESSURE: 72 MMHG | OXYGEN SATURATION: 98 % | HEART RATE: 90 BPM | HEIGHT: 60 IN | WEIGHT: 121.69 LBS | BODY MASS INDEX: 23.89 KG/M2 | SYSTOLIC BLOOD PRESSURE: 142 MMHG

## 2025-08-13 DIAGNOSIS — M81.0 AGE-RELATED OSTEOPOROSIS WITHOUT CURRENT PATHOLOGICAL FRACTURE: ICD-10-CM

## 2025-08-13 DIAGNOSIS — E11.3393 TYPE 2 DIABETES MELLITUS WITH BOTH EYES AFFECTED BY MODERATE NONPROLIFERATIVE RETINOPATHY WITHOUT MACULAR EDEMA, WITH LONG-TERM CURRENT USE OF INSULIN: ICD-10-CM

## 2025-08-13 DIAGNOSIS — Z79.4 TYPE 2 DIABETES MELLITUS WITH BOTH EYES AFFECTED BY MODERATE NONPROLIFERATIVE RETINOPATHY WITHOUT MACULAR EDEMA, WITH LONG-TERM CURRENT USE OF INSULIN: ICD-10-CM

## 2025-08-13 DIAGNOSIS — E04.1 THYROID NODULE: ICD-10-CM

## 2025-08-13 DIAGNOSIS — E10.9 TYPE 1 DIABETES MELLITUS WITHOUT COMPLICATION: ICD-10-CM

## 2025-08-13 DIAGNOSIS — E10.3393 TYPE 1 DIABETES MELLITUS WITH MODERATE NONPROLIFERATIVE RETINOPATHY OF BOTH EYES WITHOUT MACULAR EDEMA: Primary | ICD-10-CM

## 2025-08-13 PROCEDURE — 99999 PR PBB SHADOW E&M-EST. PATIENT-LVL V: CPT | Mod: PBBFAC,,, | Performed by: NURSE PRACTITIONER

## 2025-08-13 RX ORDER — INSULIN ASPART 100 [IU]/ML
INJECTION, SOLUTION INTRAVENOUS; SUBCUTANEOUS
Qty: 45 ML | Refills: 1 | Status: SHIPPED | OUTPATIENT
Start: 2025-08-13

## 2025-08-13 RX ORDER — IBUPROFEN 800 MG/1
800 TABLET, FILM COATED ORAL EVERY 6 HOURS PRN
COMMUNITY
Start: 2025-08-05

## 2025-08-13 RX ORDER — INSULIN DEGLUDEC 100 U/ML
6 INJECTION, SOLUTION SUBCUTANEOUS 2 TIMES DAILY
Qty: 3.6 ML | Refills: 11 | Status: SHIPPED | OUTPATIENT
Start: 2025-08-13 | End: 2026-08-13

## 2025-08-13 RX ORDER — BLOOD-GLUCOSE SENSOR
1 EACH MISCELLANEOUS
Qty: 3 EACH | Refills: 11 | Status: SHIPPED | OUTPATIENT
Start: 2025-08-13

## 2025-08-18 ENCOUNTER — PATIENT MESSAGE (OUTPATIENT)
Dept: OPHTHALMOLOGY | Facility: CLINIC | Age: 65
End: 2025-08-18
Payer: COMMERCIAL

## 2025-08-18 DIAGNOSIS — M25.512 ACUTE PAIN OF LEFT SHOULDER: Primary | ICD-10-CM

## 2025-08-19 ENCOUNTER — OFFICE VISIT (OUTPATIENT)
Dept: ORTHOPEDICS | Facility: CLINIC | Age: 65
End: 2025-08-19
Payer: COMMERCIAL

## 2025-08-19 ENCOUNTER — HOSPITAL ENCOUNTER (OUTPATIENT)
Facility: HOSPITAL | Age: 65
Discharge: HOME | End: 2025-08-19
Attending: ORTHOPAEDIC SURGERY
Payer: COMMERCIAL

## 2025-08-19 ENCOUNTER — OFFICE VISIT (OUTPATIENT)
Dept: FAMILY MEDICINE | Facility: CLINIC | Age: 65
End: 2025-08-19
Payer: COMMERCIAL

## 2025-08-19 VITALS — BODY MASS INDEX: 24.67 KG/M2 | WEIGHT: 122.38 LBS | HEIGHT: 59 IN

## 2025-08-19 VITALS
TEMPERATURE: 98 F | SYSTOLIC BLOOD PRESSURE: 132 MMHG | HEIGHT: 60 IN | HEART RATE: 90 BPM | WEIGHT: 122 LBS | BODY MASS INDEX: 23.95 KG/M2 | DIASTOLIC BLOOD PRESSURE: 82 MMHG | OXYGEN SATURATION: 96 %

## 2025-08-19 DIAGNOSIS — M25.512 ACUTE PAIN OF LEFT SHOULDER: ICD-10-CM

## 2025-08-19 DIAGNOSIS — M25.512 ACUTE PAIN OF LEFT SHOULDER: Primary | ICD-10-CM

## 2025-08-19 DIAGNOSIS — M75.42 SUBACROMIAL IMPINGEMENT OF LEFT SHOULDER: Primary | ICD-10-CM

## 2025-08-19 PROBLEM — E13.9 DIABETES 1.5, MANAGED AS TYPE 2: Status: ACTIVE | Noted: 2025-08-19

## 2025-08-19 PROCEDURE — 1125F AMNT PAIN NOTED PAIN PRSNT: CPT | Mod: CPTII,S$GLB,, | Performed by: STUDENT IN AN ORGANIZED HEALTH CARE EDUCATION/TRAINING PROGRAM

## 2025-08-19 PROCEDURE — 73030 X-RAY EXAM OF SHOULDER: CPT | Mod: TC,PN,LT

## 2025-08-19 PROCEDURE — 1160F RVW MEDS BY RX/DR IN RCRD: CPT | Mod: CPTII,S$GLB,, | Performed by: ORTHOPAEDIC SURGERY

## 2025-08-19 PROCEDURE — 3075F SYST BP GE 130 - 139MM HG: CPT | Mod: CPTII,S$GLB,, | Performed by: STUDENT IN AN ORGANIZED HEALTH CARE EDUCATION/TRAINING PROGRAM

## 2025-08-19 PROCEDURE — 3008F BODY MASS INDEX DOCD: CPT | Mod: CPTII,S$GLB,, | Performed by: STUDENT IN AN ORGANIZED HEALTH CARE EDUCATION/TRAINING PROGRAM

## 2025-08-19 PROCEDURE — 3044F HG A1C LEVEL LT 7.0%: CPT | Mod: CPTII,S$GLB,, | Performed by: ORTHOPAEDIC SURGERY

## 2025-08-19 PROCEDURE — 3288F FALL RISK ASSESSMENT DOCD: CPT | Mod: CPTII,S$GLB,, | Performed by: STUDENT IN AN ORGANIZED HEALTH CARE EDUCATION/TRAINING PROGRAM

## 2025-08-19 PROCEDURE — 1125F AMNT PAIN NOTED PAIN PRSNT: CPT | Mod: CPTII,S$GLB,, | Performed by: ORTHOPAEDIC SURGERY

## 2025-08-19 PROCEDURE — 3044F HG A1C LEVEL LT 7.0%: CPT | Mod: CPTII,S$GLB,, | Performed by: STUDENT IN AN ORGANIZED HEALTH CARE EDUCATION/TRAINING PROGRAM

## 2025-08-19 PROCEDURE — 99204 OFFICE O/P NEW MOD 45 MIN: CPT | Mod: S$GLB,,, | Performed by: ORTHOPAEDIC SURGERY

## 2025-08-19 PROCEDURE — 1159F MED LIST DOCD IN RCRD: CPT | Mod: CPTII,S$GLB,, | Performed by: STUDENT IN AN ORGANIZED HEALTH CARE EDUCATION/TRAINING PROGRAM

## 2025-08-19 PROCEDURE — 3288F FALL RISK ASSESSMENT DOCD: CPT | Mod: CPTII,S$GLB,, | Performed by: ORTHOPAEDIC SURGERY

## 2025-08-19 PROCEDURE — 1101F PT FALLS ASSESS-DOCD LE1/YR: CPT | Mod: CPTII,S$GLB,, | Performed by: STUDENT IN AN ORGANIZED HEALTH CARE EDUCATION/TRAINING PROGRAM

## 2025-08-19 PROCEDURE — 3079F DIAST BP 80-89 MM HG: CPT | Mod: CPTII,S$GLB,, | Performed by: STUDENT IN AN ORGANIZED HEALTH CARE EDUCATION/TRAINING PROGRAM

## 2025-08-19 PROCEDURE — 99214 OFFICE O/P EST MOD 30 MIN: CPT | Mod: S$GLB,,, | Performed by: STUDENT IN AN ORGANIZED HEALTH CARE EDUCATION/TRAINING PROGRAM

## 2025-08-19 PROCEDURE — 3008F BODY MASS INDEX DOCD: CPT | Mod: CPTII,S$GLB,, | Performed by: ORTHOPAEDIC SURGERY

## 2025-08-19 PROCEDURE — 73030 X-RAY EXAM OF SHOULDER: CPT | Mod: 26,LT,, | Performed by: STUDENT IN AN ORGANIZED HEALTH CARE EDUCATION/TRAINING PROGRAM

## 2025-08-19 PROCEDURE — 1159F MED LIST DOCD IN RCRD: CPT | Mod: CPTII,S$GLB,, | Performed by: ORTHOPAEDIC SURGERY

## 2025-08-19 PROCEDURE — 99999 PR PBB SHADOW E&M-EST. PATIENT-LVL III: CPT | Mod: PBBFAC,,, | Performed by: ORTHOPAEDIC SURGERY

## 2025-08-19 PROCEDURE — 1101F PT FALLS ASSESS-DOCD LE1/YR: CPT | Mod: CPTII,S$GLB,, | Performed by: ORTHOPAEDIC SURGERY

## 2025-08-19 PROCEDURE — 99999 PR PBB SHADOW E&M-EST. PATIENT-LVL IV: CPT | Mod: PBBFAC,,, | Performed by: STUDENT IN AN ORGANIZED HEALTH CARE EDUCATION/TRAINING PROGRAM

## 2025-08-19 PROCEDURE — 1160F RVW MEDS BY RX/DR IN RCRD: CPT | Mod: CPTII,S$GLB,, | Performed by: STUDENT IN AN ORGANIZED HEALTH CARE EDUCATION/TRAINING PROGRAM

## 2025-08-19 RX ORDER — NAPROXEN 500 MG/1
500 TABLET ORAL 2 TIMES DAILY WITH MEALS
Qty: 30 TABLET | Refills: 1 | Status: SHIPPED | OUTPATIENT
Start: 2025-08-19

## 2025-08-21 ENCOUNTER — PATIENT MESSAGE (OUTPATIENT)
Dept: OPHTHALMOLOGY | Facility: CLINIC | Age: 65
End: 2025-08-21
Payer: COMMERCIAL